# Patient Record
Sex: FEMALE | Race: WHITE | NOT HISPANIC OR LATINO | Employment: OTHER | URBAN - METROPOLITAN AREA
[De-identification: names, ages, dates, MRNs, and addresses within clinical notes are randomized per-mention and may not be internally consistent; named-entity substitution may affect disease eponyms.]

---

## 2019-10-29 ENCOUNTER — TELEPHONE (OUTPATIENT)
Dept: NEUROSURGERY | Facility: CLINIC | Age: 79
End: 2019-10-29

## 2019-10-29 NOTE — TELEPHONE ENCOUNTER
Provider:  GUTIERREZ Lu  Caller: Desiree  Time of call:   10:00  Phone #:  [phone]  Surgery:    Surgery Date:    Last visit:     Next visit:      recently .    ERASMO:         Reason for call:     Patient needs a letter mailed to her stating she can take her support dog on a plane with her.    Letter printed and left on Maribeth's desk for sig.

## 2021-08-09 PROBLEM — Z91.89 AT RISK FOR SLEEP APNEA: Status: ACTIVE | Noted: 2021-08-09

## 2021-08-09 PROBLEM — I49.3 PVC'S (PREMATURE VENTRICULAR CONTRACTIONS): Status: ACTIVE | Noted: 2021-08-09

## 2021-08-09 PROBLEM — R06.09 DOE (DYSPNEA ON EXERTION): Status: ACTIVE | Noted: 2021-08-09

## 2021-08-09 PROBLEM — I10 ESSENTIAL HYPERTENSION: Status: ACTIVE | Noted: 2021-08-09

## 2021-08-09 NOTE — PROGRESS NOTES
Subjective:     Encounter Date:08/10/2021    Patient ID: Desiree Forte is a 81 y.o.  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    SELF REFERRED  PHYSICIAN: None   ONCOLOGIST: [facility]      Chief Complaint:   Chief Complaint   Patient presents with   • Irregular Heart Beat     Consult   • Shortness of Breath     Problem List:  1. Dyspnea on exertion/acute pulmonary edema with respiratory failure  a. [facility] 3-day hospitalization 7/23/2021-7/26/2021 for hypoxia, pulmonary edema, dyspnea, NT-BNP 5260, , chest x-ray consistent with pulmonary edema with improvement of symptoms with IV Lasix  b. Echocardiogram 7/26/2021: LV cavity mildly dilated, wall thickness mildly increased, LVEF 40-45%, LV relaxation grade 1 diastolic dysfunction, LA moderately dilated, mitral valve annulus mildly calcified, patient started on Coreg, losartan, Lasix  c. Abdominal ultrasound 7/26/2021: No abdominal ascites, right pleural effusion  d. Bilateral lower extremity venous duplex 7/26/2021: No evidence of DVT  e. CCS class I chest discomfort/NYHA class II dyspnea on exertion symptoms with abnormal electrocardiogram  2. Palpitations/frequent PVCs/trigeminy/atrial tachycardia  3. Hypertension  4. Hypertriglyceridemia  5. IBS with explosive diarrhea  6. Prediabetic with hemoglobin A1c 5.7% July 2021   7. At risk for sleep apnea with snoring  8. History of left breast cancer treated with chemo and radiation  9. Surgical history:  a. Appendectomy  b. Hysterectomy  c. Left lumpectomy    Allergies   Allergen Reactions   • Metformin Diarrhea         Current Outpatient Medications:   •  carvedilol (COREG) 3.125 MG tablet, 3.125 mg 2 (Two) Times a Day., Disp: , Rfl:   •  fenofibrate (TRICOR) 145 MG tablet, Take 145 mg by mouth Daily., Disp: , Rfl:   •  furosemide (LASIX) 40 MG tablet, 40 mg Daily., Disp: ,  Rfl:   •  losartan (COZAAR) 25 MG tablet, 25 mg 2 (Two) Times a Day., Disp: , Rfl:     History of Present Illness  This is an 81-year-old white female who presents to establish care for dyspnea on exertion/CHF.  She recently had a 3-day hospitalization at [facility] in [place] for hypoxia, dyspnea, pulmonary edema, and on echocardiogram was found to have EF 40-45%.  Her lower extremity venous duplex was negative for DVT.  She had elevated BNP and was diuresed with IV Lasix.  She was recommended to discontinue Celebrex in view of NATIVIDAD.  She was started on losartan, Coreg, and Lasix during hospitalization.  She is at risk for sleep apnea with snoring but has not been diagnosed with sleep apnea in the past and has never had a sleep study.  Her A1c was 6.5%, but on glipizide is now 5.7%.  Patient has had intolerances to statins and she was told that her elevated triglycerides were familial.  She has no history of early CAD.  She has never been a smoker.  Up until recently the patient has been very active and rarely sits down for long.  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  She was visiting in [place] for a wedding when she developed pulmonary edema.  She has never had any CHF exacerbations in the past.  She denies any chest pain, presyncope, or syncope.  She had shortness of breath and abdominal distention.  She was also recently diagnosed with IBS but does not have a gastroenterologist.  Patient states that her bowel habits have changed recently and she has frequent episodes of explosive diarrhea.  She had a colonoscopy about 2 years ago that apparently was normal.  The patient no longer has a primary care physician and is requesting referral to someone in [place].  The patient has still had some shortness of breath on exertion since her hospitalization.  She also has episodes of dizziness.   She has been told of an irregular heartbeat in the past but denies any prior heart monitors, heart catheterizations, stress tests, diabetes mellitus, PIH, preeclampsia, gestational diabetes, thyroid dysfunction, COPD, rheumatic fever, asthma, or claudication.  She was recently diagnosed with prediabetes and was briefly on Metformin.  The patient states that she has lost around 18 pounds due to her frequent episodes of diarrhea and is now on a gluten-free diet.  Before her recent hospitalization, she was taking amlodipine because she would sometimes have lymphedema in her left arm because remotely she had breast cancer and chemotherapy and radiation treatments.  She has had both of her COVID vaccinations.  The patient's  used to be seen by Dr. Saucedo.  The patient is accompanied to the office today by her daughter.    Cardiovascular Disease Risk Factors  Hypertension, hyperlipidemia, diabetes mellitus, increased age    Social History     Socioeconomic History   • Marital status:      Spouse name: Not on file   • Number of children: 4   • Years of education: Not on file   • Highest education level: Not on file   Tobacco Use   • Smoking status: Never Smoker   • Smokeless tobacco: Never Used   Substance and Sexual Activity   • Alcohol use: Not Currently   • Drug use: Defer   • Sexual activity: Defer       Family History   Problem Relation Age of Onset   • Leukemia Mother    • Stroke Father        Review of Systems   Constitutional: Positive for malaise/fatigue and weight loss.   HENT: Negative.    Eyes: Positive for blurred vision.   Cardiovascular: Positive for dyspnea on exertion, irregular heartbeat, orthopnea and palpitations. Negative for chest pain, claudication, leg swelling, near-syncope and syncope.   Respiratory: Positive for cough, shortness of breath, sleep disturbances due to breathing and snoring.    Endocrine: Negative.    Hematologic/Lymphatic: Negative.    Skin: Negative.   "  Musculoskeletal: Negative.    Gastrointestinal: Positive for change in bowel habit and diarrhea. Negative for heartburn and melena.        IBS   Genitourinary: Negative.    Neurological: Positive for disturbances in coordination, excessive daytime sleepiness and dizziness. Negative for focal weakness, seizures and weakness.   Psychiatric/Behavioral: Negative.    Allergic/Immunologic: Negative.       Obtained and negative except as outlined in problem list and HPI.      ECG 12 Lead    Date/Time: 8/10/2021 3:07 PM  Performed by: Carlyle Saucedo MD  Authorized by: Carlyle Saucedo MD   Rhythm comments: Sinus rhythm with frequent PVCs, rightward axis, minimal voltage criteria for LVH, may be normal variant, borderline ECG, 70 bpm,  ms, QRS 94 ms,  ms, no prior ECGs to review                 Objective:       Vitals:    08/10/21 1356 08/10/21 1405   BP: 121/51 115/45   BP Location: Right arm Right arm   Patient Position: Sitting Standing   Pulse: 68 64   SpO2: 97% 96%   Weight: 51.4 kg (113 lb 6.4 oz)    Height: 162.6 cm (64\")      Body mass index is 19.47 kg/m².    Constitutional:       Appearance: Healthy appearance. Not in distress.   Eyes:      Funduscopic exam:     Right eye: AV nicking present. No hemorrhage or exudate.         Left eye: AV nicking present. No hemorrhage or exudate.   HENT:    Mouth/Throat:      Mouth: Mucous membranes are moist. Lacerations present. No injury, oral lesions or angioedema.      Dentition: Normal dentition. Does not have dentures. No dental tenderness, gingival swelling, dental caries, dental abscesses or gum lesions.      Tongue: No lesions. Tongue does not deviate from midline.      Palate: No mass and lesions.      Pharynx: Oropharynx is clear. Uvula midline. Uvula swelling present. No pharyngeal swelling, oropharyngeal exudate or posterior oropharyngeal erythema.      Tonsils: No tonsillar exudate or tonsillar abscesses.   Neck:      Vascular: Carotid bruit " present. No JVR. JVD normal.   Pulmonary:      Effort: Pulmonary effort is normal.      Breath sounds: Examination of the right-lower field reveals rales. Examination of the left-lower field reveals rales. No wheezing. No rhonchi. Rales (faint) present.   Chest:      Chest wall: Not tender to palpatation.   Cardiovascular:      PMI at left midclavicular line. Normal rate. Regular rhythm. Normal S1. Normal S2.      Murmurs: There is a grade 2/6 mid frequency harsh early systolic murmur at the URSB and LLSB, radiating to the neck.      No gallop. No click. No rub.   Pulses:     Carotid: 2+ bilaterally.     Radial: 2+ bilaterally.     Femoral: 2+ bilaterally.     Dorsalis pedis: 2+ bilaterally.     Posterior tibial: 2+ bilaterally.  Edema:     Peripheral edema absent.   Abdominal:      General: Bowel sounds are normal.      Palpations: Abdomen is soft.      Tenderness: There is no abdominal tenderness.   Musculoskeletal: Normal range of motion.         General: No tenderness. Skin:     General: Skin is warm and dry.   Neurological:      General: No focal deficit present.      Mental Status: Alert and oriented to person, place and time.         Lab Review:     · Chest x-ray 7/26/2021: Bilateral opacities most likely related to edema    7/26/2021:  · BMP: Sodium 138, potassium 4.1, chloride 103, carbon dioxide 28, glucose 101, BUN 55, creatinine 1.2, calcium 9.9, GFR 43      7/23/2021:  · CBC: WBC 10.5, RBC 4.11, hemoglobin 11, hematocrit 36.5, MCV 88.8, MCH 28.8, MCHC 30.1, RDW 14.9, platelets 517, MPV 11.1, neutrophils 76.8, lymphocytes 9.6, monocytes 8.4, eos 3.7, basos 0.6  · Hepatic function panel: Protein 6.8, albumin 4, bilirubin 0.4, ALT 38, AST 60, alkaline phosphatase 79, bilirubin 0.2  · INR 1, protime 10.9  · NT-BNP 5260  · Troponin 0.013    Advance Care Planning   ACP discussion was held with the patient during this visit. Patient has an advance directive (not in EMR), copy requested.  Assessment:        Patient with recent 3-day hospitalization at [facility] in [place] July 2021 for hypoxia, dyspnea, pulmonary edema, and on echocardiogram was found to have EF 40-45% and she was started on Coreg, losartan, and Lasix.  The patient will need to have an ischemic evaluation to assess for focal obstructive CAD.  The patient is agreeable to a heart catheterization; procedure, risks, and complications discussed with the patient in office and she is agreeable to proceed.  We will uptitrate her Coreg to 6.25 mg twice daily.We will also have her wear an E patch to assess PVC burden as well as other arrhythmias or PAF in view of her frequent PVCs and dizziness.  She is at risk for sleep apnea so we will screen her with an outpatient sleep oximetry study.  In view of the patient's recent hospitalization for pulmonary edema, will order renal artery duplex to rule out renal artery stenosis as a cause.  The patient does not have a primary care physician so we will make a referral to Kristofer Elder MD in [place].  Patient has had explosive diarrhea and recent diagnosis of IBS so we will make a referral to Jesus Morton MD.  Lastly, we will switch her Lasix to torsemide 10 mg daily.     Diagnosis Plan   1. BRUNO (dyspnea on exertion)  Holter Monitor - 72 Hour Up To 15 Days    Basic Metabolic Panel    Magnesium   2. PVC's (premature ventricular contractions)  Holter Monitor - 72 Hour Up To 15 Days    Basic Metabolic Panel    Magnesium   3. Essential hypertension  Holter Monitor - 72 Hour Up To 15 Days    Basic Metabolic Panel    Magnesium   4. At risk for sleep apnea  Outpatient sleep oximetry study          Plan:       1. Patient to continue current medications and close follow up with the above providers with the following recommendations:  A. LHC +/-CBI  B. BMP, Magnesium   C. OP sleep oximetry study  D. Uptitrating Coreg to 6.25 mg twice daily  E. E patch  F. Referral for PCP (Kristofer Elder MD), GI (Jesus Morton  MD)  G. Switch lasix to torsemide 10mg daily  H. Renal artery duplex  2. Tentative cardiology follow up in 6 weeks in the [place] office or patient may return sooner PRN  3. 1 800 card    Scribed for Carlyle Saucedo MD by Elvira Casper, APRN. 8/10/2021  15:03 EDT     I, Carlyle Saucedo MD, [facility], personally performed the services described in this documentation as scribed by the above named individual in my presence, and it is both accurate and complete. At 16:33 EDT on 08/10/2021

## 2021-08-10 ENCOUNTER — OFFICE VISIT (OUTPATIENT)
Dept: CARDIOLOGY | Facility: CLINIC | Age: 81
End: 2021-08-10

## 2021-08-10 ENCOUNTER — LAB (OUTPATIENT)
Dept: LAB | Facility: HOSPITAL | Age: 81
End: 2021-08-10

## 2021-08-10 VITALS
OXYGEN SATURATION: 96 % | BODY MASS INDEX: 19.36 KG/M2 | DIASTOLIC BLOOD PRESSURE: 45 MMHG | SYSTOLIC BLOOD PRESSURE: 115 MMHG | HEIGHT: 64 IN | HEART RATE: 64 BPM | WEIGHT: 113.4 LBS

## 2021-08-10 DIAGNOSIS — I10 ESSENTIAL HYPERTENSION: ICD-10-CM

## 2021-08-10 DIAGNOSIS — Z91.89 AT RISK FOR SLEEP APNEA: ICD-10-CM

## 2021-08-10 DIAGNOSIS — I49.3 PVC'S (PREMATURE VENTRICULAR CONTRACTIONS): ICD-10-CM

## 2021-08-10 DIAGNOSIS — R06.09 DOE (DYSPNEA ON EXERTION): ICD-10-CM

## 2021-08-10 DIAGNOSIS — R06.09 DOE (DYSPNEA ON EXERTION): Primary | ICD-10-CM

## 2021-08-10 PROCEDURE — 99204 OFFICE O/P NEW MOD 45 MIN: CPT | Performed by: INTERNAL MEDICINE

## 2021-08-10 PROCEDURE — 83735 ASSAY OF MAGNESIUM: CPT

## 2021-08-10 PROCEDURE — 36415 COLL VENOUS BLD VENIPUNCTURE: CPT

## 2021-08-10 PROCEDURE — 80048 BASIC METABOLIC PNL TOTAL CA: CPT

## 2021-08-10 PROCEDURE — 93000 ELECTROCARDIOGRAM COMPLETE: CPT | Performed by: INTERNAL MEDICINE

## 2021-08-10 RX ORDER — FUROSEMIDE 40 MG/1
40 TABLET ORAL DAILY
COMMUNITY
Start: 2021-07-26 | End: 2021-08-12 | Stop reason: ALTCHOICE

## 2021-08-10 RX ORDER — FENOFIBRATE 145 MG/1
145 TABLET, COATED ORAL DAILY
COMMUNITY
Start: 2021-05-21 | End: 2021-08-12 | Stop reason: SDUPTHER

## 2021-08-10 RX ORDER — TORSEMIDE 10 MG/1
10 TABLET ORAL DAILY
Qty: 90 TABLET | Refills: 3 | Status: SHIPPED | OUTPATIENT
Start: 2021-08-10 | End: 2021-11-01 | Stop reason: SDUPTHER

## 2021-08-10 RX ORDER — LOSARTAN POTASSIUM 25 MG/1
25 TABLET ORAL 2 TIMES DAILY
COMMUNITY
Start: 2021-07-26 | End: 2021-08-12 | Stop reason: SDUPTHER

## 2021-08-10 RX ORDER — CARVEDILOL 3.12 MG/1
3.12 TABLET ORAL 2 TIMES DAILY
COMMUNITY
Start: 2021-07-26 | End: 2021-08-10 | Stop reason: DRUGHIGH

## 2021-08-10 RX ORDER — CARVEDILOL 6.25 MG/1
6.25 TABLET ORAL 2 TIMES DAILY
Qty: 180 TABLET | Refills: 3 | Status: SHIPPED | OUTPATIENT
Start: 2021-08-10 | End: 2022-08-03

## 2021-08-11 ENCOUNTER — DOCUMENTATION (OUTPATIENT)
Dept: CARDIOLOGY | Facility: CLINIC | Age: 81
End: 2021-08-11

## 2021-08-11 LAB
ANION GAP SERPL CALCULATED.3IONS-SCNC: 9.8 MMOL/L (ref 5–15)
BUN SERPL-MCNC: 33 MG/DL (ref 8–23)
BUN/CREAT SERPL: 29.7 (ref 7–25)
CALCIUM SPEC-SCNC: 9.7 MG/DL (ref 8.6–10.5)
CHLORIDE SERPL-SCNC: 102 MMOL/L (ref 98–107)
CO2 SERPL-SCNC: 28.2 MMOL/L (ref 22–29)
CREAT SERPL-MCNC: 1.11 MG/DL (ref 0.57–1)
GFR SERPL CREATININE-BSD FRML MDRD: 47 ML/MIN/1.73
GLUCOSE SERPL-MCNC: 72 MG/DL (ref 65–99)
MAGNESIUM SERPL-MCNC: 2.2 MG/DL (ref 1.6–2.4)
POTASSIUM SERPL-SCNC: 4.4 MMOL/L (ref 3.5–5.2)
SODIUM SERPL-SCNC: 140 MMOL/L (ref 136–145)

## 2021-08-11 NOTE — PROGRESS NOTES
I called and spoke with the patient regarding her laboratory results. I encouraged the patient to drink more water since her creatinine was slightly elevated at 1.11 and she verbalized understanding.    08/10/2021:  · BMP: Glucose 72, BUN 33, creatinine 1.11, sodium 140, potassium 4.4, chloride 102, carbon dioxide 28.2, calcium 9.7, GFR 47  · Magnesium 2.20      Electronically signed by EMILY Bird, 08/11/21, 10:13 AM EDT.

## 2021-08-12 RX ORDER — LOSARTAN POTASSIUM 25 MG/1
25 TABLET ORAL 2 TIMES DAILY
Qty: 180 TABLET | Refills: 3 | Status: SHIPPED | OUTPATIENT
Start: 2021-08-12 | End: 2022-05-12

## 2021-08-12 RX ORDER — FENOFIBRATE 145 MG/1
145 TABLET, COATED ORAL DAILY
Qty: 90 TABLET | Refills: 3 | Status: SHIPPED | OUTPATIENT
Start: 2021-08-12 | End: 2022-07-17 | Stop reason: HOSPADM

## 2021-08-16 ENCOUNTER — TELEPHONE (OUTPATIENT)
Dept: CARDIOLOGY | Facility: CLINIC | Age: 81
End: 2021-08-16

## 2021-08-16 NOTE — TELEPHONE ENCOUNTER
"Pt only took torsemide 10 mg this morning. Pt states that since Thursday 8/12/21 she has to go to bed shortly after taking medications due to dizziness, light-headedness, weakness, heaviness in chest and SOB. Patient has blurry vision and trouble focusing after medications as well.   Patient started having these symptoms since Thursday 6/12/21 when patient started increased Coreg and torsemide 10 mg daily. Patient believes torsemide is the issue. Patient doesn't check BP/HR regularly. Patient states that symptoms last all day but decrease gradually. Pt's symptoms return and \"knock her out\" after next dose of medications.     Current /50 40    Pt denies swelling, chest pain, current SOB. Pt is still slightly dizzy and weak.     Pt currently taking:  Torsemide 10 mg daily  Coreg 6.25 mg BID  Losartan 25 mg BID  Fenofibrate 145 mg daily    Please advise.     "

## 2021-08-16 NOTE — TELEPHONE ENCOUNTER
Called pt and gave KTS recommendations above. Pt verbalizes understanding and agreeable to plan.    Pt is going to come in tomorrow, 8/17 for EKG.

## 2021-08-16 NOTE — TELEPHONE ENCOUNTER
Noted; suspect carvedilol is producing her symptoms and not torsemide.  Discontinue Coreg and have her obtain electrocardiogram.    Thanks!

## 2021-08-17 ENCOUNTER — CLINICAL SUPPORT (OUTPATIENT)
Dept: CARDIOLOGY | Facility: CLINIC | Age: 81
End: 2021-08-17

## 2021-08-17 ENCOUNTER — TELEPHONE (OUTPATIENT)
Dept: GASTROENTEROLOGY | Facility: CLINIC | Age: 81
End: 2021-08-17

## 2021-08-17 ENCOUNTER — OFFICE VISIT (OUTPATIENT)
Dept: GASTROENTEROLOGY | Facility: CLINIC | Age: 81
End: 2021-08-17

## 2021-08-17 VITALS
DIASTOLIC BLOOD PRESSURE: 64 MMHG | SYSTOLIC BLOOD PRESSURE: 114 MMHG | BODY MASS INDEX: 19.19 KG/M2 | HEART RATE: 74 BPM | TEMPERATURE: 96.6 F | HEIGHT: 64 IN | OXYGEN SATURATION: 96 % | WEIGHT: 112.4 LBS

## 2021-08-17 DIAGNOSIS — R19.7 DIARRHEA, UNSPECIFIED TYPE: ICD-10-CM

## 2021-08-17 DIAGNOSIS — R14.0 BLOATING: Primary | ICD-10-CM

## 2021-08-17 DIAGNOSIS — R63.4 WEIGHT LOSS: ICD-10-CM

## 2021-08-17 DIAGNOSIS — R19.4 CHANGE IN BOWEL HABITS: ICD-10-CM

## 2021-08-17 DIAGNOSIS — I49.3 PVC'S (PREMATURE VENTRICULAR CONTRACTIONS): Primary | ICD-10-CM

## 2021-08-17 PROCEDURE — 99204 OFFICE O/P NEW MOD 45 MIN: CPT | Performed by: NURSE PRACTITIONER

## 2021-08-17 PROCEDURE — 93000 ELECTROCARDIOGRAM COMPLETE: CPT | Performed by: INTERNAL MEDICINE

## 2021-08-17 RX ORDER — METRONIDAZOLE 500 MG/1
500 TABLET ORAL 2 TIMES DAILY
Qty: 14 TABLET | Refills: 0 | Status: SHIPPED | OUTPATIENT
Start: 2021-08-17 | End: 2021-08-18 | Stop reason: SDUPTHER

## 2021-08-17 NOTE — PROGRESS NOTES
New Patient Consultation     Patient Name: Desiree Forte  : 1940   MRN: [id number]     Chief Complaint:    Chief Complaint   Patient presents with   • Diarrhea   • Bloated   • Gas       History of Present Illness: Desiree Forte is a 81 y.o. female who is here today for a Gastroenterology Consultation for change in bowel habits.    Desiree reports a 1 year history of change in bowel habits. Symptoms are explosive gas, mucous, and bloating. She is having approx 5-6 bristol scale 4 Bms a day.  She reports anal leaking of mucous requiring her to wear a pad.  Prior to this she had normal bowel movements. There is no associated  abdominal pain.   Has had a 17 lb weight loss since Nanette- party from diet changes.  Following a gluten free diet did help her bloating symptoms but did not resolve them. Cutting out dairy did not change her symptoms.  Her symptoms did not correlate with medication changes. There are no new pets.  She did recently travel to [place]-no other travel.    Denies heart burn and trouble swallowing.  No fever or joint swelling. No personal history of autoimmune disease.  Remote history breast cancer- s/p chemo and radiation    Abdominal surgical Hx of appy and hysterectomy.    Prior to this year, Desiree has been very active.  She reports significant fatigue that is worsening over the past 2 to 3 weeks.  She notes some palpitations.  She was recently hospitalized in [place] for hypoxia, dyspnea, and pulmonary edema.  She is now followed closely with cardiology and is wearing a heart monitor.  She is scheduled for a left heart cath on the  of this month.  Subjective      Review of Systems:   Review of Systems   Constitutional: Positive for fatigue and unexpected weight loss. Negative for appetite change, chills and fever.   HENT: Negative for trouble swallowing.    Respiratory: Positive for shortness of breath.    Cardiovascular: Positive for palpitations. Negative for chest  pain.   Gastrointestinal: Positive for abdominal distention and diarrhea. Negative for abdominal pain, anal bleeding, blood in stool, constipation, nausea, rectal pain, vomiting, GERD and indigestion.   Neurological: Negative for confusion.       Past Medical History:   Past Medical History:   Diagnosis Date   • Abnormal heart rhythm    • Cancer (CMS/HCC)     breast   • Congestive heart failure (CHF) (CMS/HCC)    • Hyperlipidemia    • Irregular heart beat        Past Surgical History:   Past Surgical History:   Procedure Laterality Date   • APPENDECTOMY     • COLONOSCOPY     • HYSTERECTOMY         Family History:   Family History   Problem Relation Age of Onset   • Leukemia Mother    • Stroke Father    • Colon cancer Neg Hx    • Colon polyps Neg Hx    • Esophageal cancer Neg Hx        Social History:   Social History     Socioeconomic History   • Marital status:      Spouse name: Not on file   • Number of children: Not on file   • Years of education: Not on file   • Highest education level: Not on file   Tobacco Use   • Smoking status: Never Smoker   • Smokeless tobacco: Never Used   Vaping Use   • Vaping Use: Never used   Substance and Sexual Activity   • Alcohol use: Not Currently   • Drug use: Defer   • Sexual activity: Defer       Alcohol/Tobacco History:   Social History     Substance and Sexual Activity   Alcohol Use Not Currently     Social History     Tobacco Use   Smoking Status Never Smoker   Smokeless Tobacco Never Used       Medications:     Current Outpatient Medications:   •  carvedilol (COREG) 6.25 MG tablet, Take 1 tablet by mouth 2 (Two) Times a Day., Disp: 180 tablet, Rfl: 3  •  fenofibrate (TRICOR) 145 MG tablet, Take 1 tablet by mouth Daily., Disp: 90 tablet, Rfl: 3  •  losartan (COZAAR) 25 MG tablet, Take 1 tablet by mouth 2 (Two) Times a Day., Disp: 180 tablet, Rfl: 3  •  metroNIDAZOLE (FLAGYL) 500 MG tablet, Take 1 tablet by mouth 2 (Two) Times a Day., Disp: 14 tablet, Rfl: 0  •   "torsemide (DEMADEX) 10 MG tablet, Take 1 tablet by mouth Daily., Disp: 90 tablet, Rfl: 3    Allergies:   Allergies   Allergen Reactions   • Metformin Diarrhea   • Statins Other (See Comments)     Muscle pain       Objective     Physical Exam:  Vital Signs:   Vitals:    08/17/21 1005   BP: 114/64   BP Location: Right arm   Patient Position: Sitting   Cuff Size: Adult   Pulse: 74   Temp: 96.6 °F (35.9 °C)   TempSrc: Temporal   SpO2: 96%   Weight: 51 kg (112 lb 6.4 oz)   Height: 162.6 cm (64.02\")     Body mass index is 19.28 kg/m².     Physical Exam  Vitals and nursing note reviewed.   Constitutional:       General: She is not in acute distress.     Appearance: She is well-developed. She is not diaphoretic.   Eyes:      General: No scleral icterus.     Extraocular Movements:      Right eye: No nystagmus.      Left eye: No nystagmus.      Conjunctiva/sclera: Conjunctivae normal.      Pupils: Pupils are equal, round, and reactive to light.   Neck:      Thyroid: No thyromegaly.   Cardiovascular:      Rate and Rhythm: Normal rate and regular rhythm. Frequent extrasystoles are present.     Pulses: No decreased pulses.      Comments: Heart monitor in place  Pulmonary:      Effort: Pulmonary effort is normal. No respiratory distress.      Breath sounds: Examination of the left-upper field reveals decreased breath sounds. Examination of the left-lower field reveals decreased breath sounds. Decreased breath sounds present. No wheezing, rhonchi or rales.   Abdominal:      General: Bowel sounds are normal. There is no distension. There are no signs of injury.      Palpations: Abdomen is soft. There is no hepatomegaly or splenomegaly.      Tenderness: There is no abdominal tenderness.      Hernia: No hernia is present.   Musculoskeletal:      Cervical back: Neck supple.      Right lower leg: No edema.      Left lower leg: No edema.   Skin:     General: Skin is warm and dry.      Capillary Refill: Capillary refill takes 2 to 3 " seconds.      Coloration: Skin is not jaundiced or pale.      Findings: No bruising or petechiae.      Nails: There is no clubbing.   Neurological:      Mental Status: She is alert and oriented to person, place, and time.   Psychiatric:         Behavior: Behavior normal.         Thought Content: Thought content normal.         Judgment: Judgment normal.     Reviewed referring provider office note  Reviewed lab work and ultrasound of abdomen  Assessment / Plan      Assessment/Plan:   Diagnoses and all orders for this visit:    1. Bloating (Primary)  -     Ova & Parasite Examination - Stool, Per Rectum; Future  -     Gastrointestinal Panel, PCR - Stool, Per Rectum; Future  -     CT Abdomen Pelvis Without Contrast; Future    2. Change in bowel habits  -     Ova & Parasite Examination - Stool, Per Rectum; Future  -     Gastrointestinal Panel, PCR - Stool, Per Rectum; Future  -     CT Abdomen Pelvis Without Contrast; Future    3. Diarrhea, unspecified type  -     Ova & Parasite Examination - Stool, Per Rectum; Future  -     Gastrointestinal Panel, PCR - Stool, Per Rectum; Future  -     metroNIDAZOLE (FLAGYL) 500 MG tablet; Take 1 tablet by mouth 2 (Two) Times a Day.  Dispense: 14 tablet; Refill: 0  -     CT Abdomen Pelvis Without Contrast; Future    4. Weight loss  -     CT Abdomen Pelvis Without Contrast; Future       Unfortunately, patient has new diagnosis congestive heart failure and is undergoing cardiac work-up.  She will ultimately need a colonoscopy given her concerning symptoms, but we need to make sure she is stable enough from a cardiopulmonary standpoint.  She is headed up to the cardiology office after this visit to have an EKG.  We will obtain stool studies and treat for SIBO today with Flagyl (xifaxan was >$800).  Advised to take this medication with food.  Will obtain CT abdomen with oral contrast only due to NATIVIDAD  Follow Up:   Return in about 3 weeks (around 9/7/2021).    Plan of care reviewed with the  patient at the conclusion of today's visit.  Education was provided regarding diagnosis, management, and any prescribed or recommended OTC medications.  Patient verbalized understanding of and agreement with management plan.       EMILY Grover  [facility] [place] [facility]

## 2021-08-17 NOTE — TELEPHONE ENCOUNTER
Noted; her electrocardiogram remains abnormal but stable with occasional interpolated PVCs and borderline left ventricular hypertrophy without acute ST-T changes. She can restart her carvedilol 6.25 mg twice daily and I would have her hold her torsemide daily unless she develops increasing shortness of breath, edema, or weight gain of 2 to 3 pounds over 24 hours. She will need to weigh herself daily. Please have her update us in the next 3 days.    Thanks!

## 2021-08-17 NOTE — TELEPHONE ENCOUNTER
Patient came in for walk-in EKG, given to KTS for review. /56.    Patient reports that she has lost 17 pounds in the last 3 weeks, and she did not take any meds this morning but is still having same symptoms, dizziness and weakness.    Please advise.

## 2021-08-17 NOTE — TELEPHONE ENCOUNTER
I  Called Ms Ramírez back. Informed her that stool can be turned in at [facility]. Go through ER to registration to registered. Stool test orders has been faxed to their lab. Patient voiced understanding.

## 2021-08-18 ENCOUNTER — PREP FOR SURGERY (OUTPATIENT)
Dept: OTHER | Facility: HOSPITAL | Age: 81
End: 2021-08-18

## 2021-08-18 ENCOUNTER — TELEPHONE (OUTPATIENT)
Dept: INTERNAL MEDICINE | Facility: CLINIC | Age: 81
End: 2021-08-18

## 2021-08-18 DIAGNOSIS — R06.09 DYSPNEA ON EXERTION: Primary | ICD-10-CM

## 2021-08-18 DIAGNOSIS — R19.7 DIARRHEA, UNSPECIFIED TYPE: ICD-10-CM

## 2021-08-18 DIAGNOSIS — Z00.00 HEALTHCARE MAINTENANCE: ICD-10-CM

## 2021-08-18 PROBLEM — A04.72 C. DIFFICILE DIARRHEA: Status: ACTIVE | Noted: 2021-08-18

## 2021-08-18 RX ORDER — SODIUM CHLORIDE 0.9 % (FLUSH) 0.9 %
10 SYRINGE (ML) INJECTION AS NEEDED
Status: CANCELLED | OUTPATIENT
Start: 2021-08-18

## 2021-08-18 RX ORDER — ASPIRIN 81 MG/1
324 TABLET, CHEWABLE ORAL ONCE
Status: CANCELLED | OUTPATIENT
Start: 2021-08-18 | End: 2021-08-18

## 2021-08-18 RX ORDER — NITROGLYCERIN 0.4 MG/1
0.4 TABLET SUBLINGUAL
Status: CANCELLED | OUTPATIENT
Start: 2021-08-18

## 2021-08-18 RX ORDER — SODIUM CHLORIDE 0.9 % (FLUSH) 0.9 %
3 SYRINGE (ML) INJECTION EVERY 12 HOURS SCHEDULED
Status: CANCELLED | OUTPATIENT
Start: 2021-08-18

## 2021-08-18 RX ORDER — METRONIDAZOLE 500 MG/1
500 TABLET ORAL 2 TIMES DAILY
Qty: 6 TABLET | Refills: 0 | Status: SHIPPED | OUTPATIENT
Start: 2021-08-18 | End: 2021-08-18

## 2021-08-18 RX ORDER — ASPIRIN 81 MG/1
81 TABLET ORAL DAILY
Status: CANCELLED | OUTPATIENT
Start: 2021-08-19

## 2021-08-18 RX ORDER — ACETAMINOPHEN 325 MG/1
650 TABLET ORAL EVERY 4 HOURS PRN
Status: CANCELLED | OUTPATIENT
Start: 2021-08-18

## 2021-08-18 RX ORDER — METRONIDAZOLE 500 MG/1
500 TABLET ORAL 3 TIMES DAILY
Qty: 30 TABLET | Refills: 0 | Status: SHIPPED | OUTPATIENT
Start: 2021-08-18 | End: 2021-08-26

## 2021-08-18 NOTE — TELEPHONE ENCOUNTER
A cn from [facility] called and stated pts gi panel was CDIF positive. Pt is not a pt of  our office, just incase they call back.

## 2021-08-19 ENCOUNTER — TELEPHONE (OUTPATIENT)
Dept: CARDIOLOGY | Facility: CLINIC | Age: 81
End: 2021-08-19

## 2021-08-19 NOTE — TELEPHONE ENCOUNTER
Please see pt correspondence above.       Pt currently taking:  Coreg 6.25 mg BID  Torsemide 10 mg daily  Losartan 25 mg BID  Fenofibrate 145 mg daily    Please advise.

## 2021-08-19 NOTE — TELEPHONE ENCOUNTER
----- Message from Desiree Forte sent at 8/19/2021 11:34 AM EDT -----  Regarding: RE: Prescription Question  Contact: [phone]  My BP has run:  16th 138/52 pulse 40 3pm  16th 105/49 pulse 79 6 pm  17th 114/76 pulse 79 8:45 am  17th 116/52 pulse 74 8 pm  18th 134/71 pulse 71 8:30 am  18th 126/46 pulse LOW 6:30 pm  19th 111/44 pulse 67 11 am      No pain, no palpitations, yes shortness of breath. Oxygen levels appears good.   No change in shortness of breath..been this way for awhile.     ----- Message -----        From:AILYN TAVERAS        Sent:8/19/2021 10:59 AM EDT          To:Desiree Forte     Subject:RE: Prescription Question     Good morning,     What has your blood pressure been? Do you have any swelling, shortness of breath, palpitations, chest pain, dizziness, weight gain?     I was unable to get Dr. Elder' office on the phone, but I did fax them a referral. Hopefully they have received that and will call you soon to schedule. Please call their office and confirm.     Thanks,   Carmen Us RN       ----- Message -----        From:Desiree Forte        Sent:8/19/2021 10:52 AM EDT          To:Carlyle Saucedo MD     Subject:Prescription Question     Carmen Weeks, I have become confused about the meds to take.  Since the gastroenterologist has found my stomach issues and I find the symptoms are very much like what I thought were med related, I have resumed all the meds Dr. Saucedo initially prescribed. No adverse reactions at all!  Don’t know if this is what you wanted me to do, just a common sense approach.  I texted you before about this, got no reply, but unsure the message was entered correctly.   Also, was Dr Saucedo successful in obtaining Dr Elder in [place] for my local GP?   I appreciate all the help you are giving me!

## 2021-08-25 ENCOUNTER — TELEPHONE (OUTPATIENT)
Dept: GASTROENTEROLOGY | Facility: CLINIC | Age: 81
End: 2021-08-25

## 2021-08-25 DIAGNOSIS — R63.4 WEIGHT LOSS: Primary | ICD-10-CM

## 2021-08-25 DIAGNOSIS — R93.5 ABNORMAL CT OF THE ABDOMEN: ICD-10-CM

## 2021-08-25 DIAGNOSIS — Z85.3 PERSONAL HISTORY OF MALIGNANT NEOPLASM OF BREAST: ICD-10-CM

## 2021-08-25 NOTE — TELEPHONE ENCOUNTER
Spoke with patient regarding findings on CT concerning for malignancy.  Will place lab orders and be in contact about setting up endoscopy after discussing with cardiology.  Pt verbalized understanding and denied further questions at this time.

## 2021-08-26 ENCOUNTER — APPOINTMENT (OUTPATIENT)
Dept: PREADMISSION TESTING | Facility: HOSPITAL | Age: 81
End: 2021-08-26

## 2021-08-26 ENCOUNTER — TELEPHONE (OUTPATIENT)
Dept: GASTROENTEROLOGY | Facility: CLINIC | Age: 81
End: 2021-08-26

## 2021-08-26 DIAGNOSIS — A04.72 COLITIS, CLOSTRIDIUM DIFFICILE: Primary | ICD-10-CM

## 2021-08-26 RX ORDER — VANCOMYCIN HYDROCHLORIDE 125 MG/1
125 CAPSULE ORAL 4 TIMES DAILY
Qty: 40 CAPSULE | Refills: 0 | Status: SHIPPED | OUTPATIENT
Start: 2021-08-26 | End: 2021-09-05

## 2021-08-26 NOTE — TELEPHONE ENCOUNTER
Spoke with patient.  We will set up for EGD and colonoscopy in 2 weeks.  This will be done in the hospital after PAT and with cardiac clearance.  She has had resolution of the bloating with the Flagyl.  She still has 5 more days of antibiotics.  Advised to complete treatment.

## 2021-08-27 ENCOUNTER — PREP FOR SURGERY (OUTPATIENT)
Dept: OTHER | Facility: HOSPITAL | Age: 81
End: 2021-08-27

## 2021-08-27 DIAGNOSIS — R93.89 IMAGING ABNORMALITY: Primary | ICD-10-CM

## 2021-08-30 DIAGNOSIS — Z12.11 ENCOUNTER FOR SCREENING COLONOSCOPY: Primary | ICD-10-CM

## 2021-08-30 DIAGNOSIS — R93.89 ABNORMAL FINDING ON CT SCAN: Primary | ICD-10-CM

## 2021-08-30 NOTE — TELEPHONE ENCOUNTER
PATIENT IS SCHEDULED FOR COLON AND EGD ON 09.07.21 WITH DR COSTA. PLEASE OBTAIN CARDIAC CLEARANCE. PATIENT SEE DR CASTAÑEDA HERE AT [facility].

## 2021-08-30 NOTE — TELEPHONE ENCOUNTER
NEED CARDIAC CLEARANCE FOR EGD & COLONOSCOPY ON 09/07/2021 BY DR COSTA.  THANKS direct patient care (not related to procedure)/additional history taking/consult w/ pt's family directly relating to pts condition

## 2021-09-03 ENCOUNTER — PRE-ADMISSION TESTING (OUTPATIENT)
Dept: PREADMISSION TESTING | Facility: HOSPITAL | Age: 81
End: 2021-09-03

## 2021-09-03 VITALS — WEIGHT: 110.6 LBS | HEIGHT: 64 IN | BODY MASS INDEX: 18.88 KG/M2

## 2021-09-03 DIAGNOSIS — R93.5 ABNORMAL CT OF THE ABDOMEN: ICD-10-CM

## 2021-09-03 DIAGNOSIS — Z85.3 PERSONAL HISTORY OF MALIGNANT NEOPLASM OF BREAST: ICD-10-CM

## 2021-09-03 DIAGNOSIS — R63.4 WEIGHT LOSS: ICD-10-CM

## 2021-09-03 LAB
ALBUMIN SERPL-MCNC: 4.1 G/DL (ref 3.5–5.2)
ALBUMIN/GLOB SERPL: 1.7 G/DL
ALP SERPL-CCNC: 53 U/L (ref 39–117)
ALT SERPL W P-5'-P-CCNC: 15 U/L (ref 1–33)
ANION GAP SERPL CALCULATED.3IONS-SCNC: 11 MMOL/L (ref 5–15)
AST SERPL-CCNC: 31 U/L (ref 1–32)
BASOPHILS # BLD AUTO: 0.06 10*3/MM3 (ref 0–0.2)
BASOPHILS NFR BLD AUTO: 0.9 % (ref 0–1.5)
BILIRUB SERPL-MCNC: 0.2 MG/DL (ref 0–1.2)
BUN SERPL-MCNC: 31 MG/DL (ref 8–23)
BUN/CREAT SERPL: 28.2 (ref 7–25)
CALCIUM SPEC-SCNC: 10.3 MG/DL (ref 8.6–10.5)
CEA SERPL-MCNC: 2.08 NG/ML
CHLORIDE SERPL-SCNC: 104 MMOL/L (ref 98–107)
CO2 SERPL-SCNC: 27 MMOL/L (ref 22–29)
CREAT SERPL-MCNC: 1.1 MG/DL (ref 0.57–1)
DEPRECATED RDW RBC AUTO: 52.3 FL (ref 37–54)
EOSINOPHIL # BLD AUTO: 0.17 10*3/MM3 (ref 0–0.4)
EOSINOPHIL NFR BLD AUTO: 2.5 % (ref 0.3–6.2)
ERYTHROCYTE [DISTWIDTH] IN BLOOD BY AUTOMATED COUNT: 16.9 % (ref 12.3–15.4)
GFR SERPL CREATININE-BSD FRML MDRD: 48 ML/MIN/1.73
GLOBULIN UR ELPH-MCNC: 2.4 GM/DL
GLUCOSE SERPL-MCNC: 88 MG/DL (ref 65–99)
HCT VFR BLD AUTO: 33.9 % (ref 34–46.6)
HGB BLD-MCNC: 10.5 G/DL (ref 12–15.9)
IMM GRANULOCYTES # BLD AUTO: 0.02 10*3/MM3 (ref 0–0.05)
IMM GRANULOCYTES NFR BLD AUTO: 0.3 % (ref 0–0.5)
LYMPHOCYTES # BLD AUTO: 1.05 10*3/MM3 (ref 0.7–3.1)
LYMPHOCYTES NFR BLD AUTO: 15.7 % (ref 19.6–45.3)
MCH RBC QN AUTO: 26.3 PG (ref 26.6–33)
MCHC RBC AUTO-ENTMCNC: 31 G/DL (ref 31.5–35.7)
MCV RBC AUTO: 84.8 FL (ref 79–97)
MONOCYTES # BLD AUTO: 0.7 10*3/MM3 (ref 0.1–0.9)
MONOCYTES NFR BLD AUTO: 10.5 % (ref 5–12)
NEUTROPHILS NFR BLD AUTO: 4.67 10*3/MM3 (ref 1.7–7)
NEUTROPHILS NFR BLD AUTO: 70.1 % (ref 42.7–76)
NRBC BLD AUTO-RTO: 0 /100 WBC (ref 0–0.2)
PLATELET # BLD AUTO: 445 10*3/MM3 (ref 140–450)
PMV BLD AUTO: 10.6 FL (ref 6–12)
POTASSIUM SERPL-SCNC: 4.5 MMOL/L (ref 3.5–5.2)
PROT SERPL-MCNC: 6.5 G/DL (ref 6–8.5)
RBC # BLD AUTO: 4 10*6/MM3 (ref 3.77–5.28)
SODIUM SERPL-SCNC: 142 MMOL/L (ref 136–145)
WBC # BLD AUTO: 6.67 10*3/MM3 (ref 3.4–10.8)

## 2021-09-03 PROCEDURE — 82378 CARCINOEMBRYONIC ANTIGEN: CPT

## 2021-09-03 PROCEDURE — 85025 COMPLETE CBC W/AUTO DIFF WBC: CPT

## 2021-09-03 PROCEDURE — 36415 COLL VENOUS BLD VENIPUNCTURE: CPT

## 2021-09-03 PROCEDURE — 80053 COMPREHEN METABOLIC PANEL: CPT

## 2021-09-03 NOTE — DISCHARGE INSTRUCTIONS
The following information and instructions were given:  Per Anesthesia Request, patient instructed not to take their ACE/ARB medications on the AM of surgery.    Do not eat, drink, smoke or chew gum after midnight the night before surgery. This includes no mints.  Take all routine, prescribed medications including heart and blood pressure medicines with a sip of water unless otherwise instructed by your physician.   Do NOT take diabetic medication unless instructed by your physician.    DO NOT shave for two days before your procedure.  Do not wear makeup.      DO NOT wear fingernail polish (gel/regular) and/or acrylic/artificial nails on the day of surgery.   If you had a recent manicure and would rather not remove polish or artificial nails, the minimum requirement is that the polish/artificial nails must be removed from the middle finger on each hand.      If you are having surgery/procedure on an upper extremity, fingernail polish/artificial fingernails must be removed for surgery.  NO EXCEPTIONS.      If you are having surgery/procedure on a lower extremity, toenail polish on both extremities must be removed for surgery.  NO EXCEPTIONS.    Remove all jewelry (advise to go to jeweler if unable to remove).  Jewelry, especially rings, can no longer be taped for surgery.    Leave anything you consider valuable at home.    Leave your suitcase in the car until after your surgery.    Bring the following with you the day of your procedure (when applicable):       -Picture ID and insurance cards       -Co-pay/deductible required by insurance       -Medications in the original bottles (not a list) including all over-the-counter medications if not brought to PAT       -Copy of advance directive, living will or power of  documents if not brought to PAT       -CPAP or BIPAP mask and tubing (do not bring machine)       -Skin prep instruction(s) sheet       -PAT Pass    Education booklet, brochure, handout or binder  related to procedure given to patient.  Education booklet also includes general information related to their recovery that mentions signs and symptoms of infection and when to call the doctor.    When applicable, an ERAS handout/booklet was given to patient.    Pain Control After Surgery handout given to patient.    Respirex use (handout given to patient) and pneumonia prevention education provided.    Signs and Symptoms of infection discussed with patient in Pre Admission Testing.  Patient instructed to call their doctor if any of the following symptoms are noted during recovery:  Fever of 100.4 F or higher, incision that is warm or has increasing bleeding, redness or drainage.    DVT Prevention instructions given verbally during Pre Admission Testing appointment that stress the importance of ambulation to improve blood circulation.  Also encouraged patient to perform foot exercises when in bed and application of a sequential device may be applied to lower extremities to improve circulation.      Please apply Chlorhexidine wipes to surgical area (if instructed) the night before procedure and the AM of procedure and document date/time of applications on skin prep instruction sheet.

## 2021-09-03 NOTE — PAT
Please note that the patient will be finished with her vancomycin for c-diff (diarrhea) prior to her scheduled colonoscopy.    No blood pressures or sticks to left arm due to history of breast cancer.  Patient given a pink bracelet and advised to bring on the day of procedure.  Note placed in FYI tab.    Cardiology clearance from Dr Saucedo on chart

## 2021-09-06 ENCOUNTER — ANESTHESIA EVENT (OUTPATIENT)
Dept: GASTROENTEROLOGY | Facility: HOSPITAL | Age: 81
End: 2021-09-06

## 2021-09-07 ENCOUNTER — HOSPITAL ENCOUNTER (OUTPATIENT)
Facility: HOSPITAL | Age: 81
Setting detail: HOSPITAL OUTPATIENT SURGERY
Discharge: HOME | End: 2021-09-07
Attending: INTERNAL MEDICINE | Admitting: INTERNAL MEDICINE

## 2021-09-07 ENCOUNTER — ANESTHESIA (OUTPATIENT)
Dept: GASTROENTEROLOGY | Facility: HOSPITAL | Age: 81
End: 2021-09-07

## 2021-09-07 VITALS
TEMPERATURE: 97 F | OXYGEN SATURATION: 98 % | SYSTOLIC BLOOD PRESSURE: 158 MMHG | HEART RATE: 60 BPM | DIASTOLIC BLOOD PRESSURE: 66 MMHG | RESPIRATION RATE: 16 BRPM

## 2021-09-07 DIAGNOSIS — R93.89 IMAGING ABNORMALITY: ICD-10-CM

## 2021-09-07 PROCEDURE — 25010000002 PROPOFOL 10 MG/ML EMULSION: Performed by: NURSE ANESTHETIST, CERTIFIED REGISTERED

## 2021-09-07 PROCEDURE — 43239 EGD BIOPSY SINGLE/MULTIPLE: CPT | Performed by: INTERNAL MEDICINE

## 2021-09-07 PROCEDURE — C1889 IMPLANT/INSERT DEVICE, NOC: HCPCS | Performed by: INTERNAL MEDICINE

## 2021-09-07 PROCEDURE — 45380 COLONOSCOPY AND BIOPSY: CPT | Performed by: INTERNAL MEDICINE

## 2021-09-07 PROCEDURE — 45390 COLONOSCOPY W/RESECTION: CPT | Performed by: INTERNAL MEDICINE

## 2021-09-07 PROCEDURE — 88305 TISSUE EXAM BY PATHOLOGIST: CPT | Performed by: INTERNAL MEDICINE

## 2021-09-07 PROCEDURE — 45385 COLONOSCOPY W/LESION REMOVAL: CPT | Performed by: INTERNAL MEDICINE

## 2021-09-07 DEVICE — DEV CLIP ENDO RESOLUTION360 CONTRL ROT 235CM: Type: IMPLANTABLE DEVICE | Site: CECUM | Status: FUNCTIONAL

## 2021-09-07 RX ORDER — SODIUM CHLORIDE 0.9 % (FLUSH) 0.9 %
10 SYRINGE (ML) INJECTION AS NEEDED
Status: DISCONTINUED | OUTPATIENT
Start: 2021-09-07 | End: 2021-09-07 | Stop reason: HOSPADM

## 2021-09-07 RX ORDER — ACETAMINOPHEN 325 MG/1
650 TABLET ORAL ONCE AS NEEDED
Status: DISCONTINUED | OUTPATIENT
Start: 2021-09-07 | End: 2021-09-07 | Stop reason: HOSPADM

## 2021-09-07 RX ORDER — LIDOCAINE HYDROCHLORIDE 10 MG/ML
0.5 INJECTION, SOLUTION EPIDURAL; INFILTRATION; INTRACAUDAL; PERINEURAL ONCE AS NEEDED
Status: DISCONTINUED | OUTPATIENT
Start: 2021-09-07 | End: 2021-09-07 | Stop reason: HOSPADM

## 2021-09-07 RX ORDER — PROPOFOL 10 MG/ML
VIAL (ML) INTRAVENOUS AS NEEDED
Status: DISCONTINUED | OUTPATIENT
Start: 2021-09-07 | End: 2021-09-07 | Stop reason: SURG

## 2021-09-07 RX ORDER — FAMOTIDINE 20 MG/1
20 TABLET, FILM COATED ORAL ONCE
Status: DISCONTINUED | OUTPATIENT
Start: 2021-09-07 | End: 2021-09-07 | Stop reason: HOSPADM

## 2021-09-07 RX ORDER — SODIUM CHLORIDE, SODIUM LACTATE, POTASSIUM CHLORIDE, CALCIUM CHLORIDE 600; 310; 30; 20 MG/100ML; MG/100ML; MG/100ML; MG/100ML
9 INJECTION, SOLUTION INTRAVENOUS CONTINUOUS
Status: DISCONTINUED | OUTPATIENT
Start: 2021-09-07 | End: 2021-09-07 | Stop reason: HOSPADM

## 2021-09-07 RX ORDER — SODIUM CHLORIDE 0.9 % (FLUSH) 0.9 %
10 SYRINGE (ML) INJECTION EVERY 12 HOURS SCHEDULED
Status: DISCONTINUED | OUTPATIENT
Start: 2021-09-07 | End: 2021-09-07 | Stop reason: HOSPADM

## 2021-09-07 RX ORDER — FAMOTIDINE 10 MG/ML
20 INJECTION, SOLUTION INTRAVENOUS ONCE
Status: COMPLETED | OUTPATIENT
Start: 2021-09-07 | End: 2021-09-07

## 2021-09-07 RX ORDER — IPRATROPIUM BROMIDE AND ALBUTEROL SULFATE 2.5; .5 MG/3ML; MG/3ML
3 SOLUTION RESPIRATORY (INHALATION) ONCE AS NEEDED
Status: DISCONTINUED | OUTPATIENT
Start: 2021-09-07 | End: 2021-09-07 | Stop reason: HOSPADM

## 2021-09-07 RX ORDER — LIDOCAINE HYDROCHLORIDE 10 MG/ML
INJECTION, SOLUTION EPIDURAL; INFILTRATION; INTRACAUDAL; PERINEURAL AS NEEDED
Status: DISCONTINUED | OUTPATIENT
Start: 2021-09-07 | End: 2021-09-07 | Stop reason: SURG

## 2021-09-07 RX ORDER — MIDAZOLAM HYDROCHLORIDE 1 MG/ML
0.5 INJECTION INTRAMUSCULAR; INTRAVENOUS
Status: DISCONTINUED | OUTPATIENT
Start: 2021-09-07 | End: 2021-09-07 | Stop reason: HOSPADM

## 2021-09-07 RX ADMIN — SODIUM CHLORIDE, POTASSIUM CHLORIDE, SODIUM LACTATE AND CALCIUM CHLORIDE 9 ML/HR: 600; 310; 30; 20 INJECTION, SOLUTION INTRAVENOUS at 08:13

## 2021-09-07 RX ADMIN — PROPOFOL 160 MCG/KG/MIN: 10 INJECTION, EMULSION INTRAVENOUS at 08:43

## 2021-09-07 RX ADMIN — PROPOFOL 100 MG: 10 INJECTION, EMULSION INTRAVENOUS at 08:43

## 2021-09-07 RX ADMIN — FAMOTIDINE 20 MG: 10 INJECTION INTRAVENOUS at 08:13

## 2021-09-07 RX ADMIN — LIDOCAINE HYDROCHLORIDE 100 MG: 10 INJECTION, SOLUTION EPIDURAL; INFILTRATION; INTRACAUDAL; PERINEURAL at 08:43

## 2021-09-07 NOTE — ANESTHESIA PREPROCEDURE EVALUATION
Anesthesia Evaluation                  Airway   Mallampati: II  Dental      Pulmonary    Cardiovascular     (+) hypertension, CHF ,       Neuro/Psych  GI/Hepatic/Renal/Endo      Musculoskeletal     Abdominal    Substance History      OB/GYN          Other                        Anesthesia Plan    ASA 3     MAC     intravenous induction     Anesthetic plan, all risks, benefits, and alternatives have been provided, discussed and informed consent has been obtained with: patient.

## 2021-09-07 NOTE — ANESTHESIA POSTPROCEDURE EVALUATION
Patient: Desiree Forte    Procedure Summary     Date: 09/07/21 Room / Location:  [facility]    Anesthesia Start: 0838 Anesthesia Stop: 0927    Procedures:       COLONOSCOPY (N/A )      ESOPHAGOGASTRODUODENOSCOPY (N/A ) Diagnosis:       Imaging abnormality      (Imaging abnormality [R93.89])    Surgeons: Jesus Morton MD Provider: Darius Pollard MD    Anesthesia Type: MAC ASA Status: 3          Anesthesia Type: MAC    Vitals  No vitals data found for the desired time range.          Post Anesthesia Care and Evaluation    Patient location during evaluation: PACU  Patient participation: complete - patient participated  Level of consciousness: awake and alert  Pain management: adequate  Airway patency: patent  Anesthetic complications: No anesthetic complications  PONV Status: none  Cardiovascular status: hemodynamically stable and acceptable  Respiratory status: nonlabored ventilation, acceptable and nasal cannula  Hydration status: acceptable

## 2021-09-07 NOTE — DISCHARGE INSTRUCTIONS
Colonoscopy, Adult, Care After  This sheet gives you information about how to care for yourself after your procedure. Your doctor may also give you more specific instructions. If you have problems or questions, call your doctor.  What can I expect after the procedure?  After the procedure, it is common to have:  · A small amount of blood in your poop (stool) for 24 hours.  · Some gas.  · Mild cramping or bloating in your belly (abdomen).  Follow these instructions at home:  Eating and drinking    · Drink enough fluid to keep your pee (urine) pale yellow.  · Follow instructions from your doctor about what you cannot eat or drink.  · Return to your normal diet as told by your doctor. Avoid heavy or fried foods that are hard to digest.  Activity  · Rest as told by your doctor.  · Do not sit for a long time without moving. Get up to take short walks every 1-2 hours. This is important. Ask for help if you feel weak or unsteady.  · Return to your normal activities as told by your doctor. Ask your doctor what activities are safe for you.  To help cramping and bloating:    · Try walking around.  · Put heat on your belly as told by your doctor. Use the heat source that your doctor recommends, such as a moist heat pack or a heating pad.  ? Put a towel between your skin and the heat source.  ? Leave the heat on for 20-30 minutes.  ? Remove the heat if your skin turns bright red. This is very important if you are unable to feel pain, heat, or cold. You may have a greater risk of getting burned.  General instructions  · If you were given a medicine to help you relax (sedative) during your procedure, it can affect you for many hours. Do not drive or use machinery until your doctor says that it is safe.  · For the first 24 hours after the procedure:  ? Do not sign important documents.  ? Do not drink alcohol.  ? Do your daily activities more slowly than normal.  ? Eat foods that are soft and easy to digest.  · Take  over-the-counter or prescription medicines only as told by your doctor.  · Keep all follow-up visits as told by your doctor. This is important.  Contact a doctor if:  · You have blood in your poop 2-3 days after the procedure.  Get help right away if:  · You have more than a small amount of blood in your poop.  · You see large clumps of tissue (blood clots) in your poop.  · Your belly is swollen.  · You feel like you may vomit (nauseous).  · You vomit.  · You have a fever.  · You have belly pain that gets worse, and medicine does not help your pain.  Summary  · After the procedure, it is common to have a small amount of blood in your poop. You may also have mild cramping and bloating in your belly.  · If you were given a medicine to help you relax (sedative) during your procedure, it can affect you for many hours. Do not drive or use machinery until your doctor says that it is safe.  · Get help right away if you have a lot of blood in your poop, feel like you may vomit, have a fever, or have more belly pain.  This information is not intended to replace advice given to you by your health care provider. Make sure you discuss any questions you have with your health care provider.  Document Revised: 10/23/2020 Document Reviewed: 07/13/2020  [employer] Patient Education © 2021 [employer]  Upper Endoscopy, Adult, Care After  This sheet gives you information about how to care for yourself after your procedure. Your health care provider may also give you more specific instructions. If you have problems or questions, contact your health care provider.  What can I expect after the procedure?  After the procedure, it is common to have:  · A sore throat.  · Mild stomach pain or discomfort.  · Bloating.  · Nausea.  Follow these instructions at home:    · Follow instructions from your health care provider about what to eat or drink after your procedure.  · Return to your normal activities as told by your health care provider.  Ask your health care provider what activities are safe for you.  · Take over-the-counter and prescription medicines only as told by your health care provider.  · If you were given a sedative during the procedure, it can affect you for several hours. Do not drive or operate machinery until your health care provider says that it is safe.  · Keep all follow-up visits as told by your health care provider. This is important.  Contact a health care provider if you have:  · A sore throat that lasts longer than one day.  · Trouble swallowing.  Get help right away if:  · You vomit blood or your vomit looks like coffee grounds.  · You have:  ? A fever.  ? Bloody, black, or tarry stools.  ? A severe sore throat or you cannot swallow.  ? Difficulty breathing.  ? Severe pain in your chest or abdomen.  Summary  · After the procedure, it is common to have a sore throat, mild stomach discomfort, bloating, and nausea.  · If you were given a sedative during the procedure, it can affect you for several hours. Do not drive or operate machinery until your health care provider says that it is safe.  · Follow instructions from your health care provider about what to eat or drink after your procedure.  · Return to your normal activities as told by your health care provider.  This information is not intended to replace advice given to you by your health care provider. Make sure you discuss any questions you have with your health care provider.  Document Revised: 12/15/2020 Document Reviewed: 05/20/2019  [employer] Patient Education © 2021 [employer]  Moderate Conscious Sedation, Adult  Sedation is the use of medicines to promote relaxation and to relieve discomfort and anxiety. Moderate conscious sedation is a type of sedation. Under moderate conscious sedation, you are less alert than normal, but you are still able to respond to instructions, touch, or both.  Moderate conscious sedation is used during short medical and dental procedures.  It is milder than deep sedation, which is a type of sedation under which you cannot be easily woken up. It is also milder than general anesthesia, which is the use of medicines to make you unconscious. Moderate conscious sedation allows you to return to your regular activities sooner.  Tell a health care provider about:  · Any allergies you have.  · All medicines you are taking, including vitamins, herbs, eye drops, creams, and over-the-counter medicines.  · Any use of steroids. This includes steroids taken by mouth or as a cream.  · Any problems you or family members have had with sedatives and anesthetic medicines.  · Any blood disorders you have.  · Any surgeries you have had.  · Any medical conditions you have, such as sleep apnea.  · Whether you are pregnant or may be pregnant.  · Any use of cigarettes, alcohol, marijuana, or drugs.  What are the risks?  Generally, this is a safe procedure. However, problems may occur, including:  · Getting too much medicine (oversedation).  · Nausea.  · Allergic reaction to medicines.  · Trouble breathing. If this happens, a breathing tube may be used. It will be removed when you are awake and breathing on your own.  · Heart trouble.  · Lung trouble.  · Confusion that gets better with time (emergence delirium).  What happens before the procedure?  Staying hydrated  Follow instructions from your health care provider about hydration, which may include:  · Up to 2 hours before the procedure - you may continue to drink clear liquids, such as water, clear fruit juice, black coffee, and plain tea.  Eating and drinking restrictions  Follow instructions from your health care provider about eating and drinking, which may include:  · 8 hours before the procedure - stop eating heavy meals or foods, such as meat, fried foods, or fatty foods.  · 6 hours before the procedure - stop eating light meals or foods, such as toast or cereal.  · 6 hours before the procedure - stop drinking milk or  drinks that contain milk.  · 2 hours before the procedure - stop drinking clear liquids.  Medicines  Ask your health care provider about:  · Changing or stopping your regular medicines. This is especially important if you are taking diabetes medicines or blood thinners.  · Taking medicines such as aspirin and ibuprofen. These medicines can thin your blood. Do not take these medicines unless your health care provider tells you to take them.  · Taking over-the-counter medicines, vitamins, herbs, and supplements.  Tests and exams  · You will have a physical exam.  · You may have blood tests done to show how well:  ? Your kidneys and liver work.  ? Your blood clots.  General instructions  · Plan to have someone take you home from the hospital or clinic.  · If you will be going home right after the procedure, plan to have someone with you for 24 hours.  What happens during the procedure?    · You will be given the sedative. The sedative may be given:  ? As a pill that you will swallow. It can also be inserted into the rectum.  ? As a spray through the nose.  ? As an injection into the muscle.  ? As an injection into the vein through an IV.  · You may be given oxygen as needed.  · Your breathing, heart rate, and blood pressure will be monitored during the procedure.  · The medical or dental procedure will be done.  The procedure may vary among health care providers and hospitals.  What happens after the procedure?  · Your blood pressure, heart rate, breathing rate, and blood oxygen level will be monitored until you leave the hospital or clinic.  · You will get fluids through your IV if needed.  · Do not drive or operate machinery until your health care provider says that it is safe.  Summary  · Sedation is the use of medicines to promote relaxation and to relieve discomfort and anxiety. Moderate conscious sedation is a type of sedation that is used during short medical and dental procedures.  · Tell the health care  provider about any medical conditions that you have and about all the medicines that you are taking.  · You will be given the sedative as a pill, a spray through the nose, an injection into the muscle, or an injection into the vein through an IV. Vital signs are monitored during the sedation.  · Moderate conscious sedation allows you to return to your regular activities sooner.  This information is not intended to replace advice given to you by your health care provider. Make sure you discuss any questions you have with your health care provider.  Document Revised: 11/12/2020 Document Reviewed: 11/12/2020  [employer] Patient Education © 2021 [employer]

## 2021-09-07 NOTE — INTERVAL H&P NOTE
H&P reviewed. The patient was examined and there are no changes to the H&P.    Anemia, h/o c.dif, bloat. Agree with justin's note. To egd/colonoscopy    The risk of endoscopy was discussed including the risk of anesthesia, bowel perforation, bleeding, missed lesion, infection, and death should a severe complication occur.  The patient determined that the diagnostic benefit outweighed the risk.

## 2021-09-08 ENCOUNTER — TELEPHONE (OUTPATIENT)
Dept: GASTROENTEROLOGY | Facility: CLINIC | Age: 81
End: 2021-09-08

## 2021-09-08 DIAGNOSIS — D48.4 NEOPLASM OF UNCERTAIN BEHAVIOR OF OMENTUM: Primary | ICD-10-CM

## 2021-09-08 DIAGNOSIS — R93.89 ABNORMAL FINDING ON CT SCAN: ICD-10-CM

## 2021-09-08 DIAGNOSIS — R63.4 WEIGHT LOSS: ICD-10-CM

## 2021-09-08 LAB
CYTO UR: NORMAL
LAB AP CASE REPORT: NORMAL
LAB AP CLINICAL INFORMATION: NORMAL
PATH REPORT.FINAL DX SPEC: NORMAL
PATH REPORT.GROSS SPEC: NORMAL

## 2021-09-08 NOTE — TELEPHONE ENCOUNTER
Patient states that her last ct scan was done with oral contrast - do you still want another CT scan? thanks

## 2021-09-08 NOTE — TELEPHONE ENCOUNTER
Can you please let the patient know that no cancer was found on her scopes.  While this is good news, it does not give us a source for her CT abnormality.  I am going to refer her to a general surgeon to see if we can biopsy the omentum.  I will refer her to Dr. Hardin or Dr. Jaimes

## 2021-09-08 NOTE — TELEPHONE ENCOUNTER
In addition to referring her to general surgery, I am going to repeat her CT but this time with contrast

## 2021-09-09 ENCOUNTER — TELEPHONE (OUTPATIENT)
Dept: GASTROENTEROLOGY | Facility: CLINIC | Age: 81
End: 2021-09-09

## 2021-09-09 DIAGNOSIS — I47.29 VENTRICULAR TACHYCARDIA (PAROXYSMAL) (HCC): Primary | ICD-10-CM

## 2021-09-09 NOTE — TELEPHONE ENCOUNTER
----- Message from Quentin Pimentel MA sent at 9/9/2021  3:17 PM EDT -----  Regarding: RE: Visit Follow-Up Question  Contact: [phone]  .    ----- Message -----  From: Desiree Forte  Sent: 9/9/2021   3:10 PM EDT  To: [facility]  Subject: RE: Visit Follow-Up Question                     Am I supposed to be seeing a gynecologist as well?    ----- Message -----  From: ERICA LEWIS  Sent: 9/9/21, 9:09 AM  To: Desiree Forte  Subject: RE: Visit Follow-Up Question    They can get you in with the surgeon today @ 3:00 - if that works for you      ----- Message -----       From:Desiree Forte       Sent:9/9/2021  8:01 AM EDT         To:EMILY Miguel    Subject:Visit Follow-Up Question    So can this be done in [place]?

## 2021-09-13 ENCOUNTER — TELEPHONE (OUTPATIENT)
Dept: GASTROENTEROLOGY | Facility: CLINIC | Age: 81
End: 2021-09-13

## 2021-09-13 DIAGNOSIS — A04.72 COLITIS, CLOSTRIDIUM DIFFICILE: Primary | ICD-10-CM

## 2021-09-13 RX ORDER — VANCOMYCIN HYDROCHLORIDE 125 MG/1
CAPSULE ORAL
Qty: 14 CAPSULE | Refills: 0 | Status: SHIPPED | OUTPATIENT
Start: 2021-09-13 | End: 2021-10-04

## 2021-09-13 NOTE — TELEPHONE ENCOUNTER
PATIENT IS SCHEDULED FOR 09.22.21 @ 9AM [facility] [place]. NPO 4 HOURS PRIOR. PATIENT IS AWARE OF APPT.

## 2021-09-13 NOTE — TELEPHONE ENCOUNTER
Patient is scheduled with Dr. Resendez on 09.24.21 @ 9:40am at [facility].     Just wanted to let you know.     Patient is also wanting to know if patient needs ct scan still since she has an appt to have surgery with Dr. Jaimes.

## 2021-09-14 ENCOUNTER — HOSPITAL ENCOUNTER (OUTPATIENT)
Dept: CARDIOLOGY | Facility: HOSPITAL | Age: 81
Discharge: HOME | End: 2021-09-14

## 2021-09-14 ENCOUNTER — ANESTHESIA EVENT (OUTPATIENT)
Dept: PERIOP | Facility: HOSPITAL | Age: 81
End: 2021-09-14

## 2021-09-14 ENCOUNTER — PRE-ADMISSION TESTING (OUTPATIENT)
Dept: PREADMISSION TESTING | Facility: HOSPITAL | Age: 81
End: 2021-09-14

## 2021-09-14 VITALS
HEIGHT: 64 IN | BODY MASS INDEX: 18.78 KG/M2 | WEIGHT: 110 LBS | DIASTOLIC BLOOD PRESSURE: 62 MMHG | SYSTOLIC BLOOD PRESSURE: 137 MMHG

## 2021-09-14 DIAGNOSIS — I10 ESSENTIAL HYPERTENSION: ICD-10-CM

## 2021-09-14 LAB — SARS-COV-2 RNA PNL SPEC NAA+PROBE: NOT DETECTED

## 2021-09-14 PROCEDURE — U0004 COV-19 TEST NON-CDC HGH THRU: HCPCS

## 2021-09-14 PROCEDURE — 93975 VASCULAR STUDY: CPT

## 2021-09-14 PROCEDURE — C9803 HOPD COVID-19 SPEC COLLECT: HCPCS

## 2021-09-14 PROCEDURE — U0005 INFEC AGEN DETEC AMPLI PROBE: HCPCS

## 2021-09-14 RX ORDER — FAMOTIDINE 10 MG/ML
20 INJECTION, SOLUTION INTRAVENOUS ONCE
Status: CANCELLED | OUTPATIENT
Start: 2021-09-14 | End: 2021-09-14

## 2021-09-14 RX ORDER — SODIUM CHLORIDE 0.9 % (FLUSH) 0.9 %
10 SYRINGE (ML) INJECTION AS NEEDED
Status: CANCELLED | OUTPATIENT
Start: 2021-09-14

## 2021-09-14 RX ORDER — SODIUM CHLORIDE 0.9 % (FLUSH) 0.9 %
10 SYRINGE (ML) INJECTION EVERY 12 HOURS SCHEDULED
Status: CANCELLED | OUTPATIENT
Start: 2021-09-14

## 2021-09-15 ENCOUNTER — ANESTHESIA (OUTPATIENT)
Dept: PERIOP | Facility: HOSPITAL | Age: 81
End: 2021-09-15

## 2021-09-15 ENCOUNTER — HOSPITAL ENCOUNTER (OUTPATIENT)
Facility: HOSPITAL | Age: 81
Setting detail: HOSPITAL OUTPATIENT SURGERY
Discharge: HOME | End: 2021-09-15
Attending: SURGERY | Admitting: SURGERY

## 2021-09-15 VITALS
BODY MASS INDEX: 18.78 KG/M2 | WEIGHT: 110 LBS | OXYGEN SATURATION: 92 % | SYSTOLIC BLOOD PRESSURE: 147 MMHG | DIASTOLIC BLOOD PRESSURE: 57 MMHG | HEIGHT: 64 IN | RESPIRATION RATE: 16 BRPM | HEART RATE: 67 BPM | TEMPERATURE: 97.7 F

## 2021-09-15 DIAGNOSIS — K66.8 OMENTAL MASS: ICD-10-CM

## 2021-09-15 LAB
ANION GAP SERPL CALCULATED.3IONS-SCNC: 10 MMOL/L (ref 5–15)
BUN SERPL-MCNC: 27 MG/DL (ref 8–23)
BUN/CREAT SERPL: 29.3 (ref 7–25)
CALCIUM SPEC-SCNC: 10 MG/DL (ref 8.6–10.5)
CHLORIDE SERPL-SCNC: 109 MMOL/L (ref 98–107)
CO2 SERPL-SCNC: 30 MMOL/L (ref 22–29)
CREAT SERPL-MCNC: 0.92 MG/DL (ref 0.57–1)
DEPRECATED RDW RBC AUTO: 52.5 FL (ref 37–54)
ERYTHROCYTE [DISTWIDTH] IN BLOOD BY AUTOMATED COUNT: 16.6 % (ref 12.3–15.4)
GFR SERPL CREATININE-BSD FRML MDRD: 59 ML/MIN/1.73
GLUCOSE SERPL-MCNC: 64 MG/DL (ref 65–99)
HCT VFR BLD AUTO: 34.3 % (ref 34–46.6)
HGB BLD-MCNC: 10.7 G/DL (ref 12–15.9)
INR PPP: 1.08 (ref 0.85–1.16)
MCH RBC QN AUTO: 27.2 PG (ref 26.6–33)
MCHC RBC AUTO-ENTMCNC: 31.2 G/DL (ref 31.5–35.7)
MCV RBC AUTO: 87.1 FL (ref 79–97)
PLATELET # BLD AUTO: 375 10*3/MM3 (ref 140–450)
PMV BLD AUTO: 11 FL (ref 6–12)
POTASSIUM SERPL-SCNC: 4.3 MMOL/L (ref 3.5–5.2)
PROTHROMBIN TIME: 13.7 SECONDS (ref 11.4–14.4)
RBC # BLD AUTO: 3.94 10*6/MM3 (ref 3.77–5.28)
SODIUM SERPL-SCNC: 149 MMOL/L (ref 136–145)
WBC # BLD AUTO: 6.67 10*3/MM3 (ref 3.4–10.8)

## 2021-09-15 PROCEDURE — 88112 CYTOPATH CELL ENHANCE TECH: CPT | Performed by: SURGERY

## 2021-09-15 PROCEDURE — 88341 IMHCHEM/IMCYTCHM EA ADD ANTB: CPT

## 2021-09-15 PROCEDURE — 25010000002 ONDANSETRON PER 1 MG: Performed by: NURSE ANESTHETIST, CERTIFIED REGISTERED

## 2021-09-15 PROCEDURE — 25010000003 LIDOCAINE 1 % SOLUTION: Performed by: NURSE ANESTHETIST, CERTIFIED REGISTERED

## 2021-09-15 PROCEDURE — 25010000003 CEFAZOLIN IN DEXTROSE 2-4 GM/100ML-% SOLUTION: Performed by: SURGERY

## 2021-09-15 PROCEDURE — 85027 COMPLETE CBC AUTOMATED: CPT | Performed by: SURGERY

## 2021-09-15 PROCEDURE — 25010000002 PROPOFOL 10 MG/ML EMULSION: Performed by: NURSE ANESTHETIST, CERTIFIED REGISTERED

## 2021-09-15 PROCEDURE — 88305 TISSUE EXAM BY PATHOLOGIST: CPT | Performed by: SURGERY

## 2021-09-15 PROCEDURE — 88342 IMHCHEM/IMCYTCHM 1ST ANTB: CPT | Performed by: SURGERY

## 2021-09-15 PROCEDURE — 80048 BASIC METABOLIC PNL TOTAL CA: CPT | Performed by: SURGERY

## 2021-09-15 PROCEDURE — 88341 IMHCHEM/IMCYTCHM EA ADD ANTB: CPT | Performed by: SURGERY

## 2021-09-15 PROCEDURE — 88331 PATH CONSLTJ SURG 1 BLK 1SPC: CPT | Performed by: PATHOLOGY

## 2021-09-15 PROCEDURE — 88360 TUMOR IMMUNOHISTOCHEM/MANUAL: CPT

## 2021-09-15 PROCEDURE — 25010000002 DEXAMETHASONE PER 1 MG: Performed by: NURSE ANESTHETIST, CERTIFIED REGISTERED

## 2021-09-15 PROCEDURE — 85610 PROTHROMBIN TIME: CPT | Performed by: SURGERY

## 2021-09-15 RX ORDER — SODIUM CHLORIDE, SODIUM LACTATE, POTASSIUM CHLORIDE, CALCIUM CHLORIDE 600; 310; 30; 20 MG/100ML; MG/100ML; MG/100ML; MG/100ML
9 INJECTION, SOLUTION INTRAVENOUS CONTINUOUS
Status: DISCONTINUED | OUTPATIENT
Start: 2021-09-15 | End: 2021-09-15 | Stop reason: HOSPADM

## 2021-09-15 RX ORDER — HYDROMORPHONE HYDROCHLORIDE 1 MG/ML
0.25 INJECTION, SOLUTION INTRAMUSCULAR; INTRAVENOUS; SUBCUTANEOUS
Status: DISCONTINUED | OUTPATIENT
Start: 2021-09-15 | End: 2021-09-15 | Stop reason: HOSPADM

## 2021-09-15 RX ORDER — HYDROCODONE BITARTRATE AND ACETAMINOPHEN 5; 325 MG/1; MG/1
1 TABLET ORAL ONCE AS NEEDED
Status: COMPLETED | OUTPATIENT
Start: 2021-09-15 | End: 2021-09-15

## 2021-09-15 RX ORDER — ONDANSETRON 2 MG/ML
INJECTION INTRAMUSCULAR; INTRAVENOUS AS NEEDED
Status: DISCONTINUED | OUTPATIENT
Start: 2021-09-15 | End: 2021-09-15 | Stop reason: SURG

## 2021-09-15 RX ORDER — OXYCODONE HYDROCHLORIDE AND ACETAMINOPHEN 5; 325 MG/1; MG/1
1 TABLET ORAL EVERY 6 HOURS PRN
Qty: 12 TABLET | Refills: 0 | Status: SHIPPED | OUTPATIENT
Start: 2021-09-15 | End: 2021-09-27

## 2021-09-15 RX ORDER — GLYCOPYRROLATE 0.2 MG/ML
INJECTION INTRAMUSCULAR; INTRAVENOUS AS NEEDED
Status: DISCONTINUED | OUTPATIENT
Start: 2021-09-15 | End: 2021-09-15 | Stop reason: SURG

## 2021-09-15 RX ORDER — FAMOTIDINE 20 MG/1
20 TABLET, FILM COATED ORAL ONCE
Status: COMPLETED | OUTPATIENT
Start: 2021-09-15 | End: 2021-09-15

## 2021-09-15 RX ORDER — CEFAZOLIN SODIUM 2 G/100ML
2 INJECTION, SOLUTION INTRAVENOUS ONCE
Status: COMPLETED | OUTPATIENT
Start: 2021-09-15 | End: 2021-09-15

## 2021-09-15 RX ORDER — MAGNESIUM HYDROXIDE 1200 MG/15ML
LIQUID ORAL AS NEEDED
Status: DISCONTINUED | OUTPATIENT
Start: 2021-09-15 | End: 2021-09-15 | Stop reason: HOSPADM

## 2021-09-15 RX ORDER — ROCURONIUM BROMIDE 10 MG/ML
INJECTION, SOLUTION INTRAVENOUS AS NEEDED
Status: DISCONTINUED | OUTPATIENT
Start: 2021-09-15 | End: 2021-09-15 | Stop reason: SURG

## 2021-09-15 RX ORDER — BUPIVACAINE HCL/0.9 % NACL/PF 0.125 %
PLASTIC BAG, INJECTION (ML) EPIDURAL AS NEEDED
Status: DISCONTINUED | OUTPATIENT
Start: 2021-09-15 | End: 2021-09-15 | Stop reason: SURG

## 2021-09-15 RX ORDER — LABETALOL HYDROCHLORIDE 5 MG/ML
5 INJECTION, SOLUTION INTRAVENOUS
Status: DISCONTINUED | OUTPATIENT
Start: 2021-09-15 | End: 2021-09-15 | Stop reason: HOSPADM

## 2021-09-15 RX ORDER — FENTANYL CITRATE 50 UG/ML
25 INJECTION, SOLUTION INTRAMUSCULAR; INTRAVENOUS
Status: DISCONTINUED | OUTPATIENT
Start: 2021-09-15 | End: 2021-09-15 | Stop reason: HOSPADM

## 2021-09-15 RX ORDER — BUPIVACAINE HYDROCHLORIDE AND EPINEPHRINE 2.5; 5 MG/ML; UG/ML
INJECTION, SOLUTION EPIDURAL; INFILTRATION; INTRACAUDAL; PERINEURAL AS NEEDED
Status: DISCONTINUED | OUTPATIENT
Start: 2021-09-15 | End: 2021-09-15 | Stop reason: HOSPADM

## 2021-09-15 RX ORDER — CEFAZOLIN SODIUM 2 G/100ML
2 INJECTION, SOLUTION INTRAVENOUS ONCE
Status: DISCONTINUED | OUTPATIENT
Start: 2021-09-15 | End: 2021-09-15 | Stop reason: SDUPTHER

## 2021-09-15 RX ORDER — ONDANSETRON 2 MG/ML
4 INJECTION INTRAMUSCULAR; INTRAVENOUS ONCE AS NEEDED
Status: DISCONTINUED | OUTPATIENT
Start: 2021-09-15 | End: 2021-09-15 | Stop reason: HOSPADM

## 2021-09-15 RX ORDER — HEPARIN SODIUM 5000 [USP'U]/ML
5000 INJECTION, SOLUTION INTRAVENOUS; SUBCUTANEOUS ONCE
Status: DISCONTINUED | OUTPATIENT
Start: 2021-09-15 | End: 2021-09-15 | Stop reason: HOSPADM

## 2021-09-15 RX ORDER — LIDOCAINE HYDROCHLORIDE 10 MG/ML
0.5 INJECTION, SOLUTION EPIDURAL; INFILTRATION; INTRACAUDAL; PERINEURAL ONCE AS NEEDED
Status: COMPLETED | OUTPATIENT
Start: 2021-09-15 | End: 2021-09-15

## 2021-09-15 RX ORDER — LIDOCAINE HYDROCHLORIDE 10 MG/ML
INJECTION, SOLUTION INFILTRATION; PERINEURAL AS NEEDED
Status: DISCONTINUED | OUTPATIENT
Start: 2021-09-15 | End: 2021-09-15 | Stop reason: SURG

## 2021-09-15 RX ORDER — PROPOFOL 10 MG/ML
VIAL (ML) INTRAVENOUS AS NEEDED
Status: DISCONTINUED | OUTPATIENT
Start: 2021-09-15 | End: 2021-09-15 | Stop reason: SURG

## 2021-09-15 RX ORDER — MIDAZOLAM HYDROCHLORIDE 1 MG/ML
0.5 INJECTION INTRAMUSCULAR; INTRAVENOUS
Status: DISCONTINUED | OUTPATIENT
Start: 2021-09-15 | End: 2021-09-15 | Stop reason: HOSPADM

## 2021-09-15 RX ORDER — DEXAMETHASONE SODIUM PHOSPHATE 4 MG/ML
INJECTION, SOLUTION INTRA-ARTICULAR; INTRALESIONAL; INTRAMUSCULAR; INTRAVENOUS; SOFT TISSUE AS NEEDED
Status: DISCONTINUED | OUTPATIENT
Start: 2021-09-15 | End: 2021-09-15 | Stop reason: SURG

## 2021-09-15 RX ADMIN — ONDANSETRON 4 MG: 2 INJECTION INTRAMUSCULAR; INTRAVENOUS at 08:01

## 2021-09-15 RX ADMIN — Medication 100 MCG: at 07:30

## 2021-09-15 RX ADMIN — PROPOFOL 20 MG: 10 INJECTION, EMULSION INTRAVENOUS at 07:30

## 2021-09-15 RX ADMIN — DEXAMETHASONE SODIUM PHOSPHATE 4 MG: 4 INJECTION, SOLUTION INTRA-ARTICULAR; INTRALESIONAL; INTRAMUSCULAR; INTRAVENOUS; SOFT TISSUE at 07:42

## 2021-09-15 RX ADMIN — Medication 100 MCG: at 07:37

## 2021-09-15 RX ADMIN — LIDOCAINE HYDROCHLORIDE 50 MG: 10 INJECTION, SOLUTION INFILTRATION; PERINEURAL at 07:28

## 2021-09-15 RX ADMIN — SODIUM CHLORIDE, POTASSIUM CHLORIDE, SODIUM LACTATE AND CALCIUM CHLORIDE 9 ML/HR: 600; 310; 30; 20 INJECTION, SOLUTION INTRAVENOUS at 07:04

## 2021-09-15 RX ADMIN — GLYCOPYRROLATE 0.1 MG: 0.2 INJECTION INTRAMUSCULAR; INTRAVENOUS at 07:41

## 2021-09-15 RX ADMIN — PROPOFOL 100 MG: 10 INJECTION, EMULSION INTRAVENOUS at 07:28

## 2021-09-15 RX ADMIN — SUGAMMADEX 150 MG: 100 INJECTION, SOLUTION INTRAVENOUS at 08:14

## 2021-09-15 RX ADMIN — Medication 100 MCG: at 07:28

## 2021-09-15 RX ADMIN — ROCURONIUM BROMIDE 50 MG: 10 INJECTION, SOLUTION INTRAVENOUS at 07:30

## 2021-09-15 RX ADMIN — LIDOCAINE HYDROCHLORIDE 0.4 ML: 10 INJECTION, SOLUTION EPIDURAL; INFILTRATION; INTRACAUDAL; PERINEURAL at 07:03

## 2021-09-15 RX ADMIN — CEFAZOLIN SODIUM 2 G: 2 INJECTION, SOLUTION INTRAVENOUS at 07:23

## 2021-09-15 RX ADMIN — FAMOTIDINE 20 MG: 20 TABLET, FILM COATED ORAL at 07:03

## 2021-09-15 RX ADMIN — HYDROCODONE BITARTRATE AND ACETAMINOPHEN 1 TABLET: 5; 325 TABLET ORAL at 09:02

## 2021-09-15 NOTE — OP NOTE
Operative Report    Patient Name:  Desiree Forte  YOB: 1940  [id number]    9/15/2021      PREOPERATIVE DIAGNOSIS: Omental nodularity concerning for malignancy      POSTOPERATIVE DIAGNOSIS: Same, carcinomatosis       PROCEDURE PERFORMED:     1. Diagnostic laparoscopy with omental and peritoneal nodule biopsy       SURGEON: Nino Jaimes MD      ASSISTANT:    None       SPECIMENS: Peritoneal fluid for cytology, omental biopsy, peritoneal nodule biopsy      ANESTHESIA: General.        FINDINGS:  1.  There was hemorrhagic ascites noted on entrance into the abdominal cavity.  There was diffuse omental nodularity with implants along multiple portions of the omentum as well as the peritoneum in all abdominal quadrants.  There were adhesions between the omentum and small bowel to the lower abdominal midline, and the pelvis could not be visualized   2.  Peritoneal fluid was collected for cytology  3.  A biopsy of a portion of greater omentum from the right lower quadrant was taken and sent for frozen section.  Discussion with Dr. Street of pathology demonstrated that this was a malignancy, however etiology uncertain at this time.  He felt that there was adequate tissue for further analysis  4.  There was a peritoneal nodule in the right upper quadrant which was also taken for biopsy and sent for permanent pathology       INDICATIONS:      This patient is a 81 y.o. female with a history of generalized abdominal complaints with recently undergone a CT scan which was concerning for omental nodularity and advanced malignancy.  She underwent panendoscopy which did not demonstrate evidence of an etiology.  She was therefore referred to my office for further testing.. Pre-operative imaging including CT scan confirmed the diagnosis. The risks, benefits, and alternatives of the procedure were discussed with the patient and their family preoperatively and they agreed to proceed.          DESCRIPTION  OF PROCEDURE:      After obtaining informed consent, the patient was taken to the operating room and placed in the supine position on the operating room table. General anesthesia was initiated. The abdomen was prepped and draped in the usual sterile fashion. A time-out was properly performed. Antibiotics were given preoperatively. SCDs were properly placed on the patient and turned on.      Local anesthetic was injected prior to all incisions.  A vertical incision was made 2 to 3 cm above the umbilicus using 11 blade scalpel.  Dissection was carried down bluntly to the level of the anterior abdominal wall fascia.  The anterior abdominal wall fascia was incised sharply and stay sutures of 0 Vicryl placed on either side of this.  The peritoneal cavity was then entered under direct visualization.  A 12 mm Lockhart trocar was then advanced into the peritoneal cavity and pneumoperitoneum was established to 15 mmHg.  The abdomen was then surveyed with a 10 mm 30 degree laparoscope.  Laparoscopic examination of the abdomen demonstrated adhesions between the omentum and small bowel in the lower midline, however we were above this and there was no evidence of entrance injury.  There was diffuse omental nodularity and implants along essentially all portions of the omentum and in all 4 quadrants of the peritoneal cavity.  The adhesions in the lower midline prevented visualization of the pelvis.  2 additional 5 mm trochars were placed in the right lower quadrant under direct visualization.  Using a suction  device, some of the hemorrhagic ascites in the right upper quadrant was suctioned free and this was passed off the field for cytology.  Next we identified a portion of the greater omentum in the right lower quadrant which was free of the surrounding bowel.  Using a [place] LigaSure device we ligated a portion of the omentum that was approximately 3 x 3 cm.  The specimen was removed from the abdominal cavity using  Endo Catch bag through the periumbilical trocar site.  Specimen was passed off the field for frozen section.  Following this we identified one of the peritoneal nodules in the right upper quadrant, similarly this was dissected free of the abdominal wall using the [place] LigaSure device until an approximately 2 x 2 centimeter nodule was excised.  This was removed from the abdominal cavity through the periumbilical trocar site utilizing Endo Catch bag.  The specimen was passed off the field for permanent pathology.  Hemostasis of all operative sites was confirmed.  Laparoscopic examination of the visible portions of the upper abdomen demonstrated no obvious masses of the liver, anterior body of the stomach, or visible portions of the transverse colon or small bowel.     I discussed the frozen pathology with Dr. Street.  He felt that the specimen was adequate in terms of tissue amount, and represented a definite malignancy with etiology uncertain until further analysis can be performed.  Given these findings we elected to then proceed with abdominal closure.  Hemostasis of all operative sites was again confirmed.  There was still quite a bit of hemorrhagic ascites fluid in the abdomen.  The 5 mm trochars were removed under direct laparoscopic visualization and pneumoperitoneum was released.  The 12 mm trocar was removed and the fascia was closed under direct visualization using a figure-of-eight 0 Vicryl suture.  All wound sites were closed with 4 Monocryl in subcuticular layer.  Mastisol Steri-Strips were applied to each wound site with a clean dry dressing overlying this.     After the procedure, the patient was awakened, extubated, and taken to the postoperative anesthesia care unit for recovery. All needle, instrument, and lap counts were correct. I was personally present and performed all portions of the procedure. There were no immediate complications         Nino Jaimes MD  9/15/2021  08:20 EDT

## 2021-09-15 NOTE — ANESTHESIA POSTPROCEDURE EVALUATION
Patient: Desiree Forte    Procedure Summary     Date: 09/15/21 Room / Location:  [facility]    Anesthesia Start: 0723 Anesthesia Stop:     Procedure: DIAGNOSTIC LAPAROSCOPY, OMENTAL BIOPSY AND PERITONEAL NODULE BIOPSY (N/A Abdomen) Diagnosis:     Surgeons: Nino Jaimes MD Provider: Adarsh Noyola MD    Anesthesia Type: general ASA Status: 3          Anesthesia Type: general    Vitals  No vitals data found for the desired time range.          Post Anesthesia Care and Evaluation    Patient location during evaluation: PACU  Patient participation: complete - patient participated  Level of consciousness: awake and alert  Pain management: adequate  Airway patency: patent  Anesthetic complications: No anesthetic complications  PONV Status: none  Cardiovascular status: hemodynamically stable and acceptable  Respiratory status: nonlabored ventilation, acceptable and nasal cannula  Hydration status: acceptable

## 2021-09-15 NOTE — ANESTHESIA PROCEDURE NOTES
Airway  Urgency: elective    Date/Time: 9/15/2021 7:32 AM  Airway not difficult    General Information and Staff    Patient location during procedure: OR    Indications and Patient Condition  Indications for airway management: airway protection    Preoxygenated: yes  MILS not maintained throughout  Mask difficulty assessment: 1 - vent by mask    Final Airway Details  Final airway type: endotracheal airway      Successful airway: ETT  Cuffed: yes   Successful intubation technique: direct laryngoscopy  Facilitating devices/methods: anterior pressure/BURP  Endotracheal tube insertion site: oral  Blade: Christina  Blade size: 3  ETT size (mm): 7.0  Cormack-Lehane Classification: grade IIa - partial view of glottis  Placement verified by: chest auscultation and capnometry   Measured from: lips  ETT/EBT  to lips (cm): 20  Number of attempts at approach: 1  Assessment: lips, teeth, and gum same as pre-op and atraumatic intubation    Additional Comments  Negative epigastric sounds, Breath sound equal bilaterally with symmetric chest rise and fall

## 2021-09-15 NOTE — ANESTHESIA PREPROCEDURE EVALUATION
" Anesthesia Evaluation     Patient summary reviewed and Nursing notes reviewed                Airway   Mallampati: II  TM distance: >3 FB  Neck ROM: full  No difficulty expected  Dental - normal exam         Pulmonary - negative pulmonary ROS and normal exam   Cardiovascular - normal exam    (+) hypertension, dysrhythmias PVC, CHF (asymptomatic recently) , BRUNO, hyperlipidemia,     ROS comment:   Echo 7/21: EF 45-50%    \"cleared\" for surgery by cardiology    Neuro/Psych- negative ROS  GI/Hepatic/Renal/Endo - negative ROS     Musculoskeletal (-) negative ROS    Abdominal  - normal exam    Bowel sounds: normal.   Substance History - negative use     OB/GYN negative ob/gyn ROS         Other                      Anesthesia Plan    ASA 3     general     intravenous induction     Anesthetic plan, all risks, benefits, and alternatives have been provided, discussed and informed consent has been obtained with: patient.    Plan discussed with CRNA.      "

## 2021-09-15 NOTE — DISCHARGE INSTRUCTIONS
Okay for discharge when appropriate per PACU protocol.  Regular diet and activity as tolerated.  Please remind patient no lifting greater than 10 lbs for 2 weeks.   Rx provided for Percocet. Please instruct patient to transition to over the counter Tylenol and Ibuprofen as able.   Please remind patient to continue with daily stool softener. Rx provided for Docusate BID.   Ok to shower in 24 hrs. Let warm soapy water run over wounds. Pat dry do not scrub. Do not soak in tub bathe for 2 weeks.   Please instruction patient to call the [facility] [phone] if fever greater than 101.5, increased redness or drainage from wound sites, or worsening nausea or vomiting.

## 2021-09-15 NOTE — INTERVAL H&P NOTE
"[facility] Pre-op    Full history and physical note from office is up to date.     /76 (BP Location: Right arm, Patient Position: Lying)   Pulse 81   Temp 96.8 °F (36 °C) (Tympanic)   Resp 16   Ht 162.6 cm (64\")   Wt 49.9 kg (110 lb)   SpO2 93%   BMI 18.88 kg/m²       LAB Results:  Lab Results   Component Value Date    WBC 6.67 09/15/2021    HGB 10.7 (L) 09/15/2021    HCT 34.3 09/15/2021    MCV 87.1 09/15/2021     09/15/2021    NEUTROABS 4.67 09/03/2021    GLUCOSE 88 09/03/2021    BUN 31 (H) 09/03/2021    CREATININE 1.10 (H) 09/03/2021    EGFRIFNONA 48 (L) 09/03/2021     09/03/2021    K 4.5 09/03/2021     09/03/2021    CO2 27.0 09/03/2021    MG 2.2 08/10/2021    CALCIUM 10.3 09/03/2021    ALBUMIN 4.10 09/03/2021    AST 31 09/03/2021    ALT 15 09/03/2021    BILITOT 0.2 09/03/2021     *I have reviewed the patient's recent clinical results.  *9/15/21 Cardiac clearance received from Dr. Saucedo and in patient's chart.    Cancer Staging (if applicable)  Cancer Patient: __ yes __no _X_unknown__N/A; If yes, clinical stage T:__ N:__M:__, stage group or __N/A    Assessment: Omental mass    Plan: Diagnostic laparoscopy, omental biopsy      Jeanne Lopez, APRN 9/15/2021 07:07 EDT   "

## 2021-09-20 ENCOUNTER — TRANSCRIBE ORDERS (OUTPATIENT)
Dept: ADMINISTRATIVE | Facility: HOSPITAL | Age: 81
End: 2021-09-20

## 2021-09-20 DIAGNOSIS — C22.0 HEPATOCELLULAR CARCINOMA (HCC): Primary | ICD-10-CM

## 2021-09-20 LAB
BH CV ECHO MEAS - BSA(HAYCOCK): 1.5 M^2
BH CV ECHO MEAS - BSA: 1.5 M^2
BH CV ECHO MEAS - BZI_BMI: 18.9 KILOGRAMS/M^2
BH CV ECHO MEAS - BZI_METRIC_HEIGHT: 162.6 CM
BH CV ECHO MEAS - BZI_METRIC_WEIGHT: 49.9 KG
BH CV ECHO MEAS - DIST REN A EDV LEFT: 26 CM/SEC
BH CV ECHO MEAS - DIST REN A PSV LEFT: 222 CM/SEC
BH CV ECHO MEAS - DIST REN A RI LEFT: 0.88
BH CV ECHO MEAS - HILAR A EDV LEFT: 38.2 CM/SEC
BH CV ECHO MEAS - HILAR A PSV LEFT: 180 CM/SEC
BH CV ECHO MEAS - HILAR A RI LEFT: 0.79
BH CV ECHO MEAS - MID REN A EDV LEFT: 28 CM/SEC
BH CV ECHO MEAS - MID REN A PSV LEFT: 211 CM/SEC
BH CV ECHO MEAS - MID REN A RI LEFT: 0.87
BH CV ECHO MEAS - PROX REN A EDV LEFT: 32 CM/SEC
BH CV ECHO MEAS - PROX REN A PSV LEFT: 211 CM/SEC
BH CV ECHO MEAS - PROX REN A RI LEFT: 0.85
BH CV VAS BP RIGHT ARM: NORMAL MMHG
BH CV VAS KIDNEY HEIGHT LEFT: 5 CM
BH CV VAS RENAL AORTIC MID PSV: 121 CM/S
BH CV VAS RENAL HILUM LEFT EDV: 38 CM/S
BH CV VAS RENAL HILUM LEFT PSV: 180 CM/S
BH CV VAS RENAL HILUM RIGHT EDV: 20 CM/S
BH CV VAS RENAL HILUM RIGHT PSV: 160 CM/S
BH CV XCLRA SUP ARC RI LEFT: 0.71
BH CV XLRA MEAS - KID L LEFT: 10.4 CM
BH CV XLRA MEAS - RENAL A ORG RI LEFT: 0.88
BH CV XLRA MEAS - SUP ARC PSV LEFT: 50.4 CM/SEC
BH CV XLRA MEAS - SUP REN AO PSV: 121 CM/SEC
BH CV XLRA MEAS - SUP SEG EDV LEFT: 12.7 CM/SEC
BH CV XLRA MEAS - SUP SEG PSV LEFT: 106 CM/SEC
BH CV XLRA MEAS - SUP SEG RI LEFT: 0.88
BH CV XLRA MEAS DIST REN A EDV RIGHT: 26 CM/SEC
BH CV XLRA MEAS DIST REN A PSV RIGHT: 134 CM/SEC
BH CV XLRA MEAS DIST REN A RI RIGHT: 0.81
BH CV XLRA MEAS HILAR A EDV RIGHT: 19.6 CM/SEC
BH CV XLRA MEAS HILAR A PSV RIGHT: 160 CM/SEC
BH CV XLRA MEAS HILAR A RI RIGHT: 0.88
BH CV XLRA MEAS INF ARC EDV LEFT: 12.2 CM/SEC
BH CV XLRA MEAS INF ARC EDV RIGHT: 13.4 CM/SEC
BH CV XLRA MEAS INF ARC PSV LEFT: 50.4 CM/SEC
BH CV XLRA MEAS INF ARC PSV RIGHT: 40.9 CM/SEC
BH CV XLRA MEAS INF ARC RI LEFT: 0.76
BH CV XLRA MEAS INF ARC RI RIGHT: 0.67
BH CV XLRA MEAS INF SEG EDV LEFT: 15.3 CM/SEC
BH CV XLRA MEAS INF SEG EDV RIGHT: 13.4 CM/SEC
BH CV XLRA MEAS INF SEG PSV LEFT: 87.8 CM/SEC
BH CV XLRA MEAS INF SEG PSV RIGHT: 89.8 CM/SEC
BH CV XLRA MEAS INF SEG RI LEFT: 0.83
BH CV XLRA MEAS INF SEG RI RIGHT: 0.85
BH CV XLRA MEAS KID H RIGHT: 4.4 CM
BH CV XLRA MEAS KID L RIGHT: 10.2 CM
BH CV XLRA MEAS KID W RIGHT: 4.6 CM
BH CV XLRA MEAS MID REN A EDV RIGHT: 20 CM/SEC
BH CV XLRA MEAS MID REN A PSV RIGHT: 189 CM/SEC
BH CV XLRA MEAS MID REN A RI RIGHT: 0.9
BH CV XLRA MEAS PROX REN A EDV RIGHT: 17 CM/SEC
BH CV XLRA MEAS PROX REN A PSV RIGHT: 205 CM/SEC
BH CV XLRA MEAS PROX REN A RI RIGHT: 0.92
BH CV XLRA MEAS RAR LEFT: 1.7
BH CV XLRA MEAS RAR RIGHT: 1.7
BH CV XLRA MEAS RENAL A ORG EDV LEFT: 23 CM/SEC
BH CV XLRA MEAS RENAL A ORG EDV RIGHT: 21 CM/SEC
BH CV XLRA MEAS RENAL A ORG PSV LEFT: 197 CM/SEC
BH CV XLRA MEAS RENAL A ORG PSV RIGHT: 127 CM/SEC
BH CV XLRA MEAS RENAL A ORG RI RIGHT: 0.84
BH CV XLRA MEAS SUP ARC EDV RIGHT: 9.8 CM/SEC
BH CV XLRA MEAS SUP ARC PSV RIGHT: 55 CM/SEC
BH CV XLRA MEAS SUP ARC RI RIGHT: 0.82
BH CV XLRA MEAS SUP SEG EDV RIGHT: 25.7 CM/SEC
BH CV XLRA MEAS SUP SEG PSV RIGHT: 115 CM/SEC
BH CV XLRA MEAS SUP SEG RI RIGHT: 0.78
BH CV XLRA SUP ARC EDV LEFT: 14.5 CM/SEC
LAB AP CASE REPORT: NORMAL
LEFT KIDNEY WIDTH: 4 CM
LEFT RENAL UPPER PARENCHYMA MAX: 106 CM/S
LEFT RENAL UPPER PARENCHYMA MIN: 13 CM/S
LEFT RENAL UPPER PARENCHYMA RI: 0.88
PATH REPORT.FINAL DX SPEC: NORMAL
RIGHT RENAL UPPER PARENCHYMA MAX: 115 CM/S
RIGHT RENAL UPPER PARENCHYMA MIN: 26 CM/S
RIGHT RENAL UPPER PARENCHYMA RI: 0.77

## 2021-09-21 ENCOUNTER — TELEPHONE (OUTPATIENT)
Dept: ONCOLOGY | Facility: CLINIC | Age: 81
End: 2021-09-21

## 2021-09-21 NOTE — TELEPHONE ENCOUNTER
Caller: Harleen Duff    Relationship: Emergency Contact    Best call back number: [phone]    What is the best time to reach you: ASAP    Who are you requesting to speak with (clinical staff, provider,  specific staff member): NURSE    Do you know the name of the person who called:     What was the call regarding: PT WANTS TO KNOW IF APPT COULD BE CHANGED TO VIRTUAL, OR IF SHE CAN HAVE A VISITOR DUE TO COGNITIVE ISSUES    Do you require a callback: YES

## 2021-09-22 PROBLEM — C78.6: Status: ACTIVE | Noted: 2021-09-22

## 2021-09-22 PROBLEM — C22.0: Status: ACTIVE | Noted: 2021-09-22

## 2021-09-24 ENCOUNTER — HOSPITAL ENCOUNTER (OUTPATIENT)
Dept: CT IMAGING | Facility: HOSPITAL | Age: 81
Discharge: HOME | End: 2021-09-24
Admitting: SURGERY

## 2021-09-24 DIAGNOSIS — C22.0 HEPATOCELLULAR CARCINOMA (HCC): ICD-10-CM

## 2021-09-24 PROCEDURE — 71260 CT THORAX DX C+: CPT

## 2021-09-24 PROCEDURE — 74170 CT ABD WO CNTRST FLWD CNTRST: CPT

## 2021-09-24 PROCEDURE — 25010000002 IOPAMIDOL 61 % SOLUTION: Performed by: SURGERY

## 2021-09-24 RX ADMIN — IOPAMIDOL 95 ML: 612 INJECTION, SOLUTION INTRAVENOUS at 14:05

## 2021-09-27 ENCOUNTER — CONSULT (OUTPATIENT)
Dept: ONCOLOGY | Facility: CLINIC | Age: 81
End: 2021-09-27

## 2021-09-27 VITALS
RESPIRATION RATE: 16 BRPM | OXYGEN SATURATION: 96 % | WEIGHT: 114.8 LBS | HEIGHT: 64 IN | TEMPERATURE: 96.8 F | HEART RATE: 66 BPM | DIASTOLIC BLOOD PRESSURE: 63 MMHG | SYSTOLIC BLOOD PRESSURE: 111 MMHG | BODY MASS INDEX: 19.6 KG/M2

## 2021-09-27 DIAGNOSIS — C78.6 HEPATOCELLULAR CARCINOMA METASTATIC TO PERITONEUM (HCC): Primary | ICD-10-CM

## 2021-09-27 DIAGNOSIS — C22.0 HEPATOCELLULAR CARCINOMA METASTATIC TO PERITONEUM (HCC): Primary | ICD-10-CM

## 2021-09-27 PROCEDURE — 99205 OFFICE O/P NEW HI 60 MIN: CPT | Performed by: INTERNAL MEDICINE

## 2021-09-27 RX ORDER — SODIUM CHLORIDE 9 MG/ML
250 INJECTION, SOLUTION INTRAVENOUS ONCE
Status: CANCELLED | OUTPATIENT
Start: 2021-10-04

## 2021-09-27 RX ORDER — ONDANSETRON HYDROCHLORIDE 8 MG/1
8 TABLET, FILM COATED ORAL 3 TIMES DAILY PRN
Qty: 30 TABLET | Refills: 5 | Status: ON HOLD | OUTPATIENT
Start: 2021-09-27 | End: 2022-07-14

## 2021-09-27 NOTE — PROGRESS NOTES
ID: 81 y.o. year old female from [facility] [id number]    PCP: Kristofer Elder MD    REFERRING PHYSICIAN: Nino AGUILAR MD    Reason for Consultation: Metastatic hepatocellular carcinoma    Dear Dr. Jaimes    It is a pleasure to meet Ms. Forte today.  She is a very pleasant 81-year-old lady who presents today for consultation due to recent diagnosis of a hepatocellular carcinoma.  She presented with worsening ascites and abdominal distention.  She was noted to have likely carcinomatosis with omental caking present.  She underwent a recent biopsy that showed a carcinoma with hepatocellular differentiation.  She presents today to discuss her new diagnosis and options going forward.  She is in reasonable health otherwise.  Though she has become increasingly frail after losing about 20 pounds over the last 6 months.  She has more GI symptoms.  She also has distention which is somewhat uncomfortable.  She underwent paracentesis with seem to relieve some of her symptoms.      Past Medical History:   Diagnosis Date   • Abnormal heart rhythm    • Cancer (CMS/HCC)     breast   • Clostridioides difficile diarrhea    • Congestive heart failure (CHF) (CMS/HCC)    • Hyperlipidemia    • Irregular heart beat    • Migraine     occular        Past Surgical History:   Procedure Laterality Date   • APPENDECTOMY     • BREAST SURGERY      lumpectomy with lymph nodes left    • COLONOSCOPY     • COLONOSCOPY N/A 9/7/2021    Procedure: COLONOSCOPY;  Surgeon: Jesus Morton MD;  Location: [facility] ENDOSCOPY;  Service: Gastroenterology;  Laterality: N/A;   • DIAGNOSTIC LAPAROSCOPY N/A 9/15/2021    Procedure: DIAGNOSTIC LAPAROSCOPY, OMENTAL BIOPSY AND PERITONEAL NODULE BIOPSY;  Surgeon: Nino Jaimes MD;  Location: [facility] OR;  Service: General;  Laterality: N/A;   • ENDOSCOPY N/A 9/7/2021    Procedure: ESOPHAGOGASTRODUODENOSCOPY WITH BIOPSY AND POLYPECTOMY;  Surgeon: Jesus Morton MD;  Location:  [facility] ENDOSCOPY;  Service:  Gastroenterology;  Laterality: N/A;   • EYE SURGERY Bilateral     cataract   • HYSTERECTOMY         Social History     Socioeconomic History   • Marital status:      Spouse name: Not on file   • Number of children: Not on file   • Years of education: Not on file   • Highest education level: Not on file   Tobacco Use   • Smoking status: Never Smoker   • Smokeless tobacco: Never Used   Vaping Use   • Vaping Use: Never used   Substance and Sexual Activity   • Alcohol use: Not Currently   • Drug use: Never   • Sexual activity: Defer       Family History   Problem Relation Age of Onset   • Leukemia Mother    • Stroke Father    • Colon cancer Neg Hx    • Colon polyps Neg Hx    • Esophageal cancer Neg Hx        Review of Systems:    16 point review of systems was performed and reviewed and scanned into the EMR    Review of Systems - Oncology      Current Outpatient Medications:   •  carvedilol (COREG) 6.25 MG tablet, Take 1 tablet by mouth 2 (Two) Times a Day., Disp: 180 tablet, Rfl: 3  •  fenofibrate (TRICOR) 145 MG tablet, Take 1 tablet by mouth Daily., Disp: 90 tablet, Rfl: 3  •  losartan (COZAAR) 25 MG tablet, Take 1 tablet by mouth 2 (Two) Times a Day., Disp: 180 tablet, Rfl: 3  •  torsemide (DEMADEX) 10 MG tablet, Take 1 tablet by mouth Daily., Disp: 90 tablet, Rfl: 3  •  vancomycin (VANCOCIN) 125 MG capsule, Take 1 cap daily for 7 days, then 1 cap every 2 days for 2 weeks  Indications: Clostridium Difficile Infection, Disp: 14 capsule, Rfl: 0  •  ondansetron (ZOFRAN) 8 MG tablet, Take 1 tablet by mouth 3 (Three) Times a Day As Needed for Nausea or Vomiting., Disp: 30 tablet, Rfl: 5         Allergies   Allergen Reactions   • Metformin Diarrhea   • Statins Myalgia     Muscle pain         ECOG score: 3           Objective     Vitals:    09/27/21 1046   BP: 111/63   Pulse: 66   Resp: 16   Temp: 96.8 °F (36 °C)   SpO2: 96%     Body mass index is 19.71 kg/m².  Body surface area is 1.55 meters squared.         09/27/21  1046   Weight: 52.1 kg (114 lb 12.8 oz)     Pain Score    09/27/21 1046   PainSc: 0-No pain          Physical Exam    General: elderly frail appearing, in no acute distress  HEENT: sclera anicteric,  neck is supple  Lymphatics: no cervical, supraclavicular, or axillary adenopathy  Cardiovascular: regular rate and rhythm, no murmurs, rubs or gallops  Lungs: clear to auscultation bilaterally  Abdomen: distended  Extremities: no lower extremity edema  Skin: no rashes, lesions, bruising, or petechiae  Msk:  Shows no weakness of the large muscle groups  Psych: Mood is stable        Lab Results   Component Value Date    GLUCOSE 64 (L) 09/15/2021    BUN 27 (H) 09/15/2021    CREATININE 0.92 09/15/2021     (H) 09/15/2021    K 4.3 09/15/2021     (H) 09/15/2021    CO2 30.0 (H) 09/15/2021    CALCIUM 10.0 09/15/2021    PROTEINTOT 6.5 09/03/2021    ALBUMIN 4.10 09/03/2021    BILITOT 0.2 09/03/2021    ALKPHOS 53 09/03/2021    AST 31 09/03/2021    ALT 15 09/03/2021       Lab Results   Component Value Date    HGB 10.7 (L) 09/15/2021    HCT 34.3 09/15/2021    MCV 87.1 09/15/2021     09/15/2021    WBC 6.67 09/15/2021    NEUTROABS 4.67 09/03/2021    LYMPHSABS 1.05 09/03/2021    MONOSABS 0.70 09/03/2021    EOSABS 0.17 09/03/2021    BASOSABS 0.06 09/03/2021       CT Chest With Contrast Diagnostic    Result Date: 9/24/2021  1. Areas of opacification midlungs bilateral right greater than left most consistent with postinfectious or postinflammatory etiology although intermixed micronodular appearance not entirely excluding pulmonary involvement and metastasis with potential follow-up. No dominant nodule or mass however  2. Omental caking and mesenteric nodularity/reticulation consistent with peritoneal disease process potentially metastatic with no definitive evidence for cirrhotic hepatic morphology evident only a flash fill benign hemangioma the right hepatic lobe without separate area of hepatic cell  carcinoma or washout characteristics is identified. Perihepatic ascites. Partially visualized GI tract unremarkable  DICTATED:   09/24/2021 EDITED/ls :   09/24/2021  This report was finalized on 9/24/2021 5:45 PM by Dr. Chava Hall.      CT Abdomen With & Without Contrast    Result Date: 9/24/2021  1. Areas of opacification midlungs bilateral right greater than left most consistent with postinfectious or postinflammatory etiology although intermixed micronodular appearance not entirely excluding pulmonary involvement and metastasis with potential follow-up. No dominant nodule or mass however  2. Omental caking and mesenteric nodularity/reticulation consistent with peritoneal disease process potentially metastatic with no definitive evidence for cirrhotic hepatic morphology evident only a flash fill benign hemangioma the right hepatic lobe without separate area of hepatic cell carcinoma or washout characteristics is identified. Perihepatic ascites. Partially visualized GI tract unremarkable  DICTATED:   09/24/2021 EDITED/ls :   09/24/2021  This report was finalized on 9/24/2021 5:45 PM by Dr. Chava Hall.      Duplex Renal Artery - Bilateral Complete CAR    Result Date: 9/20/2021  Parenchymal blood flow pattern seen within both kidneys. No evidence of renal artery stenosis.  D:  09/17/2021 E:  09/17/2021  This report was finalized on 9/20/2021 9:32 AM by Dr. Abeba Goyal MD.      Lab Results   Component Value Date    FINALDX  09/15/2021     PERITONEAL FLUID:  Negative for malignant cells.   Slides reviewed and diagnosis rendered by Estevan Harding M.D., F.C.A.P.   Testing performed at outside laboratory. See scanned report.         Final Diagnosis   1.  OMENTAL BIOPSY:  Involved by malignant neoplasm with evidence of hepatocellular differentiation (see comment).    2.  PERITONEAL NODULE:  Involved by malignant neoplasm with evidence of hepatocellular differentiation (see comment).   Electronically signed  by Sean Street MD on 9/20/2021 at 1050           Assessment/Plan      1.  Metastatic hepatocellular carcinoma.  I have sent her tissue for next generation sequencing to make sure this is hepatocellular in origin.  I think it is reasonable to consider treatment with Tecentriq and Avastin.  This is the first line therapy recommended for this type of cancer.  Median survival in the clinical trial is about 11 months.  However the patient is fairly frail at this point.  Without any treatment her survival is going to be between 3 and 6 months.  She is fairly symptomatic and I have offered her treatment as palliation.  If she does not tolerate it then we can consider hospice care as a way of controlling her symptoms.  She is interested in proceeding with therapy at this time though she would have a low threshold to stop treatment if she becomes symptomatic.  She will need periodic therapeutic paracentesis.  I reviewed the scans with her and her daughter and explained their cancer and treatment options and answered all the questions they had for me today.  We will see her back in my clinic in 4 weeks for cycle #2.  In the interim if she is having any issues she can always give me a call.    Total time of patient care on day of service including time prior to, face to face with patient, and following visit spent in reviewing records, lab results, imaging studies, discussion with patient, and documentation/charting was > 60 minutes.        Thank you for allowing me to participate in the care of this patient.    Yours sincerely,    Primitivo Pollock MD  [facility]  Hematology and Oncology    Return on: 10/25/21  Return in (Approximately): Schedule with next infusion, 1 month    Orders Placed This Encounter   Procedures   • Comprehensive metabolic panel     Standing Status:   Future     Standing Expiration Date:   10/4/2022     Order Specific Question:   Release to patient     Answer:   Immediate   • TSH      Standing Status:   Future     Standing Expiration Date:   10/4/2022     Order Specific Question:   Release to patient     Answer:   Immediate   • T4, free     Standing Status:   Future     Standing Expiration Date:   10/4/2022     Order Specific Question:   Release to patient     Answer:   Immediate   • Urinalysis without microscopic (no culture) - Urine, Clean Catch     Standing Status:   Future     Standing Expiration Date:   10/4/2022     Order Specific Question:   Release to patient     Answer:   Immediate   • CBC and Differential     Standing Status:   Future     Standing Expiration Date:   10/4/2022     Order Specific Question:   Manual Differential     Answer:   No     Order Specific Question:   Release to patient     Answer:   Immediate

## 2021-09-29 ENCOUNTER — TELEPHONE (OUTPATIENT)
Dept: CARDIOLOGY | Facility: CLINIC | Age: 81
End: 2021-09-29

## 2021-09-29 NOTE — TELEPHONE ENCOUNTER
"Noted; none of her medications should cause spots in her vision or \"floaters.\"  She may need to talk to her eye care provider such as optometrist or ophthalmologist in this regard.  I would increase torsemide to 20 mg daily until \"crackles\" resolve and update us no later than 1 October 2021.    Thanks!  "

## 2021-09-29 NOTE — TELEPHONE ENCOUNTER
----- Message from Desiree Forte sent at 9/29/2021  3:12 PM EDT -----  Regarding: RE: Non-Urgent Medical Question  Contact: [phone]  I am taking only the meds that Dr Saucedo gave me. By the way, does one of them have a side effect that causes spots in your vision?    You  Desiree Forte 3 hours ago (11:57 AM)   MH  What medications are you currently taking?     Thanks,   AILYN Bruce Alene Robinson Scully, Carlyle MORROW MD 4 hours ago (10:48 AM)     Morning!  I have started hearing crackles again when I’m lying down. Not bad yet but this is how I started when I had Congestive heart failure.  Since discovering cancer, my oncologist said I needed to put heart issues on the back burner.  Should I not be concerned or should I be taking action in some way?      __________________________________________  Please see above correspondence.     Pt currently taking:  Losartan 25 mg BID  Torsemide 10 mg daily  Coreg 6.25 mg BID    Please advise

## 2021-09-30 ENCOUNTER — TELEPHONE (OUTPATIENT)
Dept: CARDIOLOGY | Facility: CLINIC | Age: 81
End: 2021-09-30

## 2021-09-30 NOTE — TELEPHONE ENCOUNTER
----- Message from Desiree Forte sent at 9/29/2021  7:02 PM EDT -----  Regarding: Prescription Question  Contact: [phone]  Dr Pickens indicated I don't need to be on all the prescriptions for my heart considering my cancer diagnosis.  Can you please check with him and let me know which to stop?  ____________________________________      See pt e-mail. Please advise.

## 2021-09-30 NOTE — TELEPHONE ENCOUNTER
Noted; Dr. Pickens did not mention this at all in is recent consultation note dated 9/27/2021.  However, she can discontinue fenofibrate.    Thanks!

## 2021-10-04 ENCOUNTER — HOSPITAL ENCOUNTER (OUTPATIENT)
Dept: ONCOLOGY | Facility: HOSPITAL | Age: 81
Discharge: HOME | End: 2021-10-04

## 2021-10-04 ENCOUNTER — OFFICE VISIT (OUTPATIENT)
Dept: ONCOLOGY | Facility: CLINIC | Age: 81
End: 2021-10-04

## 2021-10-04 ENCOUNTER — DOCUMENTATION (OUTPATIENT)
Dept: NUTRITION | Facility: HOSPITAL | Age: 81
End: 2021-10-04

## 2021-10-04 ENCOUNTER — EDUCATION (OUTPATIENT)
Dept: ONCOLOGY | Facility: HOSPITAL | Age: 81
End: 2021-10-04

## 2021-10-04 ENCOUNTER — NURSE NAVIGATOR (OUTPATIENT)
Dept: ONCOLOGY | Facility: CLINIC | Age: 81
End: 2021-10-04

## 2021-10-04 ENCOUNTER — LAB (OUTPATIENT)
Dept: LAB | Facility: HOSPITAL | Age: 81
End: 2021-10-04

## 2021-10-04 ENCOUNTER — HOSPITAL ENCOUNTER (OUTPATIENT)
Dept: ONCOLOGY | Facility: HOSPITAL | Age: 81
Setting detail: INFUSION SERIES
Discharge: HOME | End: 2021-10-04

## 2021-10-04 VITALS
DIASTOLIC BLOOD PRESSURE: 50 MMHG | HEART RATE: 66 BPM | BODY MASS INDEX: 19.22 KG/M2 | OXYGEN SATURATION: 98 % | SYSTOLIC BLOOD PRESSURE: 117 MMHG | HEIGHT: 64 IN | WEIGHT: 112.6 LBS | RESPIRATION RATE: 16 BRPM | TEMPERATURE: 96 F

## 2021-10-04 DIAGNOSIS — C78.6 HEPATOCELLULAR CARCINOMA METASTATIC TO PERITONEUM (HCC): Primary | ICD-10-CM

## 2021-10-04 DIAGNOSIS — C22.0 HEPATOCELLULAR CARCINOMA METASTATIC TO PERITONEUM (HCC): Primary | ICD-10-CM

## 2021-10-04 DIAGNOSIS — C78.6 HEPATOCELLULAR CARCINOMA METASTATIC TO PERITONEUM (HCC): ICD-10-CM

## 2021-10-04 DIAGNOSIS — C22.0 HEPATOCELLULAR CARCINOMA METASTATIC TO PERITONEUM (HCC): ICD-10-CM

## 2021-10-04 LAB
ALBUMIN SERPL-MCNC: 4.1 G/DL (ref 3.5–5.2)
ALBUMIN/GLOB SERPL: 1.4 G/DL
ALP SERPL-CCNC: 57 U/L (ref 39–117)
ALPHA-FETOPROTEIN: 3324 NG/ML (ref 0–8.3)
ALT SERPL W P-5'-P-CCNC: 17 U/L (ref 1–33)
ANION GAP SERPL CALCULATED.3IONS-SCNC: 10 MMOL/L (ref 5–15)
AST SERPL-CCNC: 36 U/L (ref 1–32)
BILIRUB SERPL-MCNC: 0.5 MG/DL (ref 0–1.2)
BILIRUB UR QL STRIP: NEGATIVE
BUN SERPL-MCNC: 25 MG/DL (ref 8–23)
BUN/CREAT SERPL: 25 (ref 7–25)
CALCIUM SPEC-SCNC: 9.8 MG/DL (ref 8.6–10.5)
CHLORIDE SERPL-SCNC: 106 MMOL/L (ref 98–107)
CLARITY UR: CLEAR
CO2 SERPL-SCNC: 28 MMOL/L (ref 22–29)
COLOR UR: YELLOW
CREAT SERPL-MCNC: 1 MG/DL (ref 0.57–1)
ERYTHROCYTE [DISTWIDTH] IN BLOOD BY AUTOMATED COUNT: 19.5 % (ref 12.3–15.4)
GFR SERPL CREATININE-BSD FRML MDRD: 53 ML/MIN/1.73
GLOBULIN UR ELPH-MCNC: 2.9 GM/DL
GLUCOSE SERPL-MCNC: 120 MG/DL (ref 65–99)
GLUCOSE UR STRIP-MCNC: NEGATIVE MG/DL
HCT VFR BLD AUTO: 32.1 % (ref 34–46.6)
HGB BLD-MCNC: 10.1 G/DL (ref 12–15.9)
HGB UR QL STRIP.AUTO: NEGATIVE
KETONES UR QL STRIP: NEGATIVE
LEUKOCYTE ESTERASE UR QL STRIP.AUTO: NEGATIVE
LYMPHOCYTES # BLD AUTO: 1.1 10*3/MM3 (ref 0.7–3.1)
LYMPHOCYTES NFR BLD AUTO: 20 % (ref 19.6–45.3)
MCH RBC QN AUTO: 26.8 PG (ref 26.6–33)
MCHC RBC AUTO-ENTMCNC: 31.6 G/DL (ref 31.5–35.7)
MCV RBC AUTO: 84.9 FL (ref 79–97)
MONOCYTES # BLD AUTO: 0.2 10*3/MM3 (ref 0.1–0.9)
MONOCYTES NFR BLD AUTO: 4 % (ref 5–12)
NEUTROPHILS NFR BLD AUTO: 4 10*3/MM3 (ref 1.7–7)
NEUTROPHILS NFR BLD AUTO: 76 % (ref 42.7–76)
NITRITE UR QL STRIP: NEGATIVE
PH UR STRIP.AUTO: 5 [PH] (ref 5–8)
PLATELET # BLD AUTO: 400 10*3/MM3 (ref 140–450)
PMV BLD AUTO: 8.7 FL (ref 6–12)
POTASSIUM SERPL-SCNC: 3.8 MMOL/L (ref 3.5–5.2)
PROT SERPL-MCNC: 7 G/DL (ref 6–8.5)
PROT UR QL STRIP: NEGATIVE
RBC # BLD AUTO: 3.78 10*6/MM3 (ref 3.77–5.28)
SODIUM SERPL-SCNC: 144 MMOL/L (ref 136–145)
SP GR UR STRIP: 1.02 (ref 1–1.03)
T4 FREE SERPL-MCNC: 1.69 NG/DL (ref 0.93–1.7)
TSH SERPL DL<=0.05 MIU/L-ACNC: 5.3 UIU/ML (ref 0.27–4.2)
UROBILINOGEN UR QL STRIP: NORMAL
WBC # BLD AUTO: 5.3 10*3/MM3 (ref 3.4–10.8)

## 2021-10-04 PROCEDURE — 81003 URINALYSIS AUTO W/O SCOPE: CPT

## 2021-10-04 PROCEDURE — 96417 CHEMO IV INFUS EACH ADDL SEQ: CPT

## 2021-10-04 PROCEDURE — 80053 COMPREHEN METABOLIC PANEL: CPT

## 2021-10-04 PROCEDURE — 99214 OFFICE O/P EST MOD 30 MIN: CPT | Performed by: NURSE PRACTITIONER

## 2021-10-04 PROCEDURE — 84443 ASSAY THYROID STIM HORMONE: CPT

## 2021-10-04 PROCEDURE — 96413 CHEMO IV INFUSION 1 HR: CPT

## 2021-10-04 PROCEDURE — 25010000002 BEVACIZUMAB-AWWB 400 MG/16ML SOLUTION 16 ML VIAL: Performed by: INTERNAL MEDICINE

## 2021-10-04 PROCEDURE — 85025 COMPLETE CBC W/AUTO DIFF WBC: CPT

## 2021-10-04 PROCEDURE — 36415 COLL VENOUS BLD VENIPUNCTURE: CPT

## 2021-10-04 PROCEDURE — 84439 ASSAY OF FREE THYROXINE: CPT

## 2021-10-04 PROCEDURE — 25010000002 ATEZOLIZUMAB 1200 MG/20ML SOLUTION 20 ML VIAL: Performed by: INTERNAL MEDICINE

## 2021-10-04 PROCEDURE — 36416 COLLJ CAPILLARY BLOOD SPEC: CPT

## 2021-10-04 PROCEDURE — 82105 ALPHA-FETOPROTEIN SERUM: CPT

## 2021-10-04 RX ORDER — SODIUM CHLORIDE 9 MG/ML
250 INJECTION, SOLUTION INTRAVENOUS ONCE
Status: COMPLETED | OUTPATIENT
Start: 2021-10-04 | End: 2021-10-04

## 2021-10-04 RX ADMIN — BEVACIZUMAB-AWWB 800 MG: 400 INJECTION, SOLUTION INTRAVENOUS at 15:27

## 2021-10-04 RX ADMIN — SODIUM CHLORIDE 250 ML: 9 INJECTION, SOLUTION INTRAVENOUS at 13:45

## 2021-10-04 RX ADMIN — ATEZOLIZUMAB 1200 MG: 1200 INJECTION, SOLUTION INTRAVENOUS at 14:18

## 2021-10-04 NOTE — PLAN OF CARE
Outpatient Infusion • [address] [facility] • [address] • [place] [id number] • [phone]      CHEMOTHERAPY EDUCATION SHEET    NAME:  Desiree Forte      : 1940           DATE: 10/04/21    Booklets Given: Chemotherapy and You []  Eating Hints []    Sexuality/Fertility Books []     Chemotherapy/Biotherapy Education Sheets: (list all that apply)    Atezolizumab, Bevacizumab-awwb                                                                                                                                                              Chemotherapy Regimen:  21 day cycles with atezolizumab and bevacizumab-awwb on day 1    TOPICS EDUCATION PROVIDED EDUCATION REINFORCED COMMENTS   ANEMIA:  role of RBC, cause, s/s, ways to manage, role of transfusion [x] [] Discussed role of RBCs, their potential to be effected by therapy, and regular monitoring of blood counts. Discussed potential for fatigue during treatment.   THROMBOCYTOPENIA:  role of platelet, cause, s/s, ways to prevent bleeding, things to avoid, when to seek help [x] [] Discussed role of platelets, their potential to be effected by therapy, and regular monitoring of blood counts. Discussed potential for increased bleeding or bruising.    NEUTROPENIA:  role of WBC, cause, infection precautions, s/s of infection, when to call MD [x] [] Discussed role of WBCs, their potential to be effected by therapy, and regular monitoring of blood counts. Discussed infection precautions. Patient has received 2 doses of the COVID-19 vaccine. Discussed third shot booster. Patient has had a severe reaction to the flu vaccine previously and no longer receives the vaccine. Instructed to call in case of a temperature of 100.4 or greater.   NUTRITION & APPETITE CHANGES:  importance of maintaining healthy diet & weight, ways to manage to improve intake, dietary consult, exercise regimen [] []    DIARRHEA:  causes, s/s of dehydration, ways to manage, dietary changes, when to  call MD [x] [] Discussed potential for diarrhea. Recommended OTC loperamide as directed on the box if needed. Patient reports history of C. Diff. Instructed to call if diarrhea persists over 24 hours.    CONSTIPATION:  causes, ways to manage, dietary changes, when to call MD [x] [] Discussed potential for constipation. Recommended OTC docusate or miralax if needed.    NAUSEA & VOMITING:  cause, use of antiemetics, dietary changes, when to call MD [x] [] Discussed minimal risk for nausea. Patient has home zofran if needed. Instructed to call if zofran does not adequately control nausea.   MOUTH SORES:  causes, oral care, ways to manage [x] [] Discussed potential for mouth sores. Recommended soft bristle tooth brushes, mouthwash with baking soda salt and water.    ALOPECIA:  cause, ways to manage, resources [x] [] Discussed possibility of alopecia during therapy.   INFERTILITY & SEXUALITY:  causes, fertility preservation options, sexuality changes, ways to manage, importance of birth control [] []    NERVOUS SYSTEM CHANGES:  causes, s/s, neuropathies, cognitive changes, ways to manage [x] [] Discussed potential for peripheral neuropathy. Instructed to call if this occurs or worsens.    PAIN:  causes, ways to manage [] [] ????   SKIN & NAIL CHANGES:  cause, s/s, ways to manage [x] [] Discussed skin rashes with immune checkpoint inhibitors. Instructed to use emollients if needed.    ORGAN TOXICITIES:  cause, s/s, need for diagnostic tests, labs, when to notify MD [x] [] Discussed regular monitoring of labs to assess blood counts, organ function, electrolytes, hormone levels (TSH) and proteinuria.   SURVIVORSHIP:  distress, distress assessment, secondary malignancies, early/late effects, follow-up, social issues, social support [] []    HOME CARE:  use of spill kits, storing of PO chemo, how to manage bodily fluids [x] [] Discussed potential to excrete trace amounts of medication through the urine and stool in the 48  hours following infusions. Instructed to close the toilet an double flush, others to wear protective gloves if cleaning the toilet in this time period.    MISCELLANEOUS:  drug interactions, administration, vesicant, et [] []      Referrals:    N/A    Notes:   Discussed aforementioned material. Provided patient with the pharmacist's contact information  and instructions to call should additional questions arise. All questions and concerns addressed.  Completed a medication reconciliation. Provided appropriate educational materials and a  personalized calendar of tentative treatment cycles.     Thank you,  Frederic Campoverde  PharmD Candidate 2022  10/4/2021 10:30 AM

## 2021-10-04 NOTE — PROGRESS NOTES
"Outpatient Oncology Nutrition     Reason for Visit:     Oncology Nutrition Screening and Patient Education / met with patient during her initial infusion appointment.     Patient Name:  Desiree Forte    :  1940    MRN:  [id number]    Date of Encounter: 10/04/2021    Nutrition Assessment     Diagnosis: metastatic hepatocellular carcinoma    Chemotherapy: Tecentriq / Mvasi - every 21 days     Patient Active Problem List:    Patient Active Problem List   Diagnosis   • BRUNO (dyspnea on exertion)   • PVC's (premature ventricular contractions)   • Essential hypertension   • At risk for sleep apnea   • C. 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  She also reports being treated for Cdiff recently and that her bowel movements are much better.    Hydration Status   Discussed the importance of hydration and encouraged her to continue drinking water throughout the day.    Goal: 56-64 ounces     Enteral Feeding       Anthropometric Measurements     Height:    Ht Readings from Last 1 Encounters:   10/04/21 162.6 cm (64\")       Weight:    Wt Readings from Last 1 Encounters:   10/04/21 51.1 kg (112 lb 9.6 oz)       BMI:  19.3 - Low Normal  Usual Body Weight: ~130#    Weight Change: ~18# (13.9%) weight loss x 3 weeks (in July) per patient and weight has been stable x ~2 months     Review of Lab Data (Time Frame - 1 month / 2 month)   Labs reviewed - 10/4/21 - elevated glucose, BUN, AST noted     Medication Review   MAR reviewed     Nutrition Focused Physical Findings       Nutrition Impact Symptoms   Early satiety    Physical Activity   Not my normal self, but able to be up and about with fairly normal activities    Current Nutritional Intake     Oral diet:  Regular     Oral nutritional supplements: ~2 Boost daily     Intake: patient reports her " oral intake has been normal     Malnutrition Risk Assessment     Recent weight loss over the past 6 months:  Yes    How much weight loss:  2 = 14-23 lbs    Eating poorly because of a decreased appetite:  0 = No    Malnutrition Screening Score:     MST = 2 more Patient at risk for malnutrition     Nutrition Diagnosis     Problem Unintentional weight loss   Etiology Newly diagnosed metastatic hepatocellular carcinoma    Signs / Symptoms ~18# (13.9%) weight loss x 3 weeks per patient / weight has been stable x ~2 months      Nutrition Intervention   Discussed the importance of good nutrition during her treatment course focusing on adequate calorie, protein, nutrient and fluid intake.  Advised her to be consuming smaller more frequent meals/snacks throughout the day to aid with early satiety symptom management and potential nausea management.  Emphasized the importance of protein and its role in the diet; reviewed high protein foods; and recommended she have a protein source at each meal/snack.  Also discussed ONS and their role in the diet.  Provided samples of Boost Plus and Ensure Plus as well as coupons for both.  Encouraged her to continue drinking ONS as needed to aid with calorie and protein intake.    Goal   To achieve / maintain adequate nutritional and hydration intake during her treatment course.  To aid with nutrition impact symptom management as needed.     Monitoring / Evaluation   Answered her questions and she voiced understanding of information discussed.  RD's contact information provided and encouraged to call with questions.  Will monitor as needed during her treatment course.    Abeba Silva MS, RD, LD

## 2021-10-04 NOTE — PROGRESS NOTES
I met patient in Infusion while she was waiting for her chemo to start. ValorieRN, was waiting on patient's labs to come back.  I introduced myself and went over resources in the Cancer Center.  Patient's symptoms of gas/bloating started in 5/2021. She lost 20 pounds in 3 weeks.  Patient also had an episode of CHF and then found out that she had metastatic cancer.  Patient looks good today and she says that she feels good.  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 He passed away 2 years ago.  I provided my contact card for navigational needs. AG

## 2021-10-04 NOTE — PROGRESS NOTES
CHEMOTHERAPY PREPARATION    Desiree Forte  [id number]  1940    Chief Complaint: Treatment preparation and needs assessment    History of present illness:  Desiree Forte is a 81 y.o. year old female who is here today for treatment preparation and needs assessment.  The patient has been diagnosed with metastatic hepatocellular carcinoma and is scheduled to begin treatment with Atezolizumab / Bevacizumab-awwb.     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     Past Medical History, Past Surgical History, Social History, Family History have been reviewed and are without significant changes except as mentioned.    Review of Systems:    Review of Systems   Constitutional: Positive for fatigue. Negative for appetite change, fever and unexpected weight change.   HENT: Negative for mouth sores, sore throat and trouble swallowing.    Respiratory: Negative for cough, shortness of breath and wheezing.    Cardiovascular: Negative for chest pain, palpitations and leg swelling.   Gastrointestinal: Negative for abdominal distention, abdominal pain, constipation, diarrhea, nausea and vomiting.   Genitourinary: Negative for difficulty urinating, dysuria and frequency.   Musculoskeletal: Negative for arthralgias.   Skin: Negative for pallor, rash and wound.   Neurological: Negative for dizziness and weakness.   Hematological: Does not bruise/bleed easily.   Psychiatric/Behavioral: Negative for confusion and sleep disturbance. The patient is not nervous/anxious.        Physical Exam:    Vitals:    10/04/21 1059   BP: 117/50   Pulse: 66   Resp: 16   Temp: 96 °F (35.6 °C)   SpO2: 98%     Vitals:     10/04/21 1059   PainSc: 0-No pain          ECOG: (1) Restricted in Physically Strenuous Activity, Ambulatory & Able to Do Work of Light Nature    General: well appearing, in no acute distress  Cardiovascular: regular rate and rhythm, + murmur  Lungs: clear to auscultation bilaterally, respirations even and unlabored  Extremities: no lower extremity edema  Skin: no rashes, lesions, bruising, or petechiae  Psych: Mood is stable            NEEDS ASSESSMENTS    Genetics  The patient's new diagnosis and family history have been reviewed for genetic counseling needs. A genetic referral is not recommended.     Psychosocial  The patient has completed a PHQ-9 Depression Screening and the Distress Thermometer (DT) today.   PHQ-9 results show 1-4 (Minimal Depression). The patient scored their distress today as 0 on a scale of 0-10 with 0 being no distress and 10 being extreme distress.   Problems marked by the patient as being an issue for them within the last week include physical problems.   Results were reviewed along with psychosocial resources offered by our cancer center.  Our oncology social worker will be flagged for a DT score of 4 or above, and a same day call will be made for a score of 9 or 10.  A mental health referral is offered at this time. The patient is not interested in a referral to [facility].   Copies of patient's questionnaires will be scanned into EMR for details and further reference.    Barriers to care  A barriers form was also completed by the patient today. We discussed services offered by our facility to help her have adequate access to care. The patient was given the name and contact information for our Oncology Social Worker, Xenia Jarquin.  Based upon barriers assessment today, the patient will not require a follow-up call from the  to further discuss needs.   A copy of the barriers form will also be scanned into EMR for details and further reference.     VAD  "Assessment  The patient and I discussed planned intervenous chemotherapy as well as other IV treatments that are often needed throughout the course of treatment. These may include, but are not limited to blood transfusions, antibiotics, and IV hydration. The vasculature does appear to be adequate for multiple peripheral IVs throughout their treatment course.     Advanced Care Planning  The patient and I discussed advanced care planning, \"Conversations that Matter\".   This service was offered, free of charge, for development of advance directives with a certified ACP facilitator.  The patient does not have an up-to-date advanced directive. The patient is not interested in an appointment with one of our facilitators to create or update their advanced directives.      Palliative Care  The patient and I discussed palliative care services. Palliative care is not the same as Hospice care. This is specialized medical care for people living with serious illness with the goal of improving quality of life for the patient and their family. [facility] has partnered with [facility] to offer our patients outpatient palliative care early along with their treatment to assist in coordination of care, symptom management, pain management, and medical decision making.  Oncology criteria for palliative care referral is not met at this time. The patient is not interested in a palliative care consultation.     Additional Referral needs  none      CHEMOTHERAPY EDUCATION    Chemotherapy education completed and consent obtained per oncology pharmacist.  See their documentation for further details.    Chemotherapy Regimen:   Treatment Plans     Name Type Plan Dates Plan Provider         Active    OP HEPATOBILIARY Atezolizumab / Bevacizumab-awwb  ONCOLOGY TREATMENT  9/27/2021 - Present Primitivo Pollock MD                    Chemotherapy education comprehension reviewed. Questions answered.      Assessment and Plan:    Diagnoses " and all orders for this visit:    1. 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 Needs assessment was completed including genetics, psychosocial needs, barriers to care, VAD evaluation, advanced care planning, and palliative care services. Referrals have been ordered as appropriate based upon our evaluation and patient desires.     Chemotherapy teaching was also completed today per pharmacy.  Adequate time was given to answer questions.  Patient and family are aware of their care team members and contact information if they have questions or problems throughout the treatment course. The patient is adequately prepared to begin treatment as scheduled today.     Reviewed with patient education regarding Zofran prescriptions sent to pharmacy.       Patient had labs drawn today.    I spent 30 minutes caring for Desiree on this date of service. This time includes time spent by me in the following activities: preparing for the visit, performing a medically appropriate examination and/or evaluation, counseling and educating the patient/family/caregiver, documenting information in the medical record and care coordination.     Latrice Olguin, APRN  10/04/21

## 2021-10-08 LAB
CYTO UR: NORMAL
LAB AP CARIS, ADDENDUM: NORMAL
LAB AP CASE REPORT: NORMAL
LAB AP CLINICAL INFORMATION: NORMAL
LAB AP DIAGNOSIS COMMENT: NORMAL
Lab: NORMAL
PATH REPORT.FINAL DX SPEC: NORMAL
PATH REPORT.GROSS SPEC: NORMAL

## 2021-10-14 ENCOUNTER — TELEPHONE (OUTPATIENT)
Dept: GASTROENTEROLOGY | Facility: CLINIC | Age: 81
End: 2021-10-14

## 2021-10-14 DIAGNOSIS — A04.72 COLITIS, CLOSTRIDIUM DIFFICILE: ICD-10-CM

## 2021-10-14 DIAGNOSIS — R19.7 DIARRHEA OF PRESUMED INFECTIOUS ORIGIN: Primary | ICD-10-CM

## 2021-10-14 NOTE — TELEPHONE ENCOUNTER
----- Message from [facility] Miguel sent at 10/14/2021  9:47 AM EDT -----  Can you please fax the C. difficile order over to [facility]?

## 2021-10-20 DIAGNOSIS — A04.72 COLITIS, CLOSTRIDIUM DIFFICILE: Primary | ICD-10-CM

## 2021-10-20 RX ORDER — VANCOMYCIN HYDROCHLORIDE 125 MG/1
CAPSULE ORAL
Qty: 84 CAPSULE | Refills: 0 | Status: SHIPPED | OUTPATIENT
Start: 2021-10-20 | End: 2022-05-12

## 2021-10-25 ENCOUNTER — HOSPITAL ENCOUNTER (OUTPATIENT)
Dept: ONCOLOGY | Facility: HOSPITAL | Age: 81
Setting detail: INFUSION SERIES
Discharge: HOME | End: 2021-10-25

## 2021-10-25 ENCOUNTER — OFFICE VISIT (OUTPATIENT)
Dept: ONCOLOGY | Facility: CLINIC | Age: 81
End: 2021-10-25

## 2021-10-25 ENCOUNTER — DOCUMENTATION (OUTPATIENT)
Dept: NUTRITION | Facility: HOSPITAL | Age: 81
End: 2021-10-25

## 2021-10-25 VITALS — SYSTOLIC BLOOD PRESSURE: 122 MMHG | DIASTOLIC BLOOD PRESSURE: 58 MMHG

## 2021-10-25 VITALS
TEMPERATURE: 97 F | OXYGEN SATURATION: 95 % | SYSTOLIC BLOOD PRESSURE: 130 MMHG | HEART RATE: 67 BPM | BODY MASS INDEX: 18.86 KG/M2 | RESPIRATION RATE: 16 BRPM | DIASTOLIC BLOOD PRESSURE: 75 MMHG | HEIGHT: 64 IN | WEIGHT: 110.5 LBS

## 2021-10-25 DIAGNOSIS — C22.0 HEPATOCELLULAR CARCINOMA METASTATIC TO PERITONEUM (HCC): Primary | ICD-10-CM

## 2021-10-25 DIAGNOSIS — C78.6 HEPATOCELLULAR CARCINOMA METASTATIC TO PERITONEUM (HCC): Primary | ICD-10-CM

## 2021-10-25 LAB
ALBUMIN SERPL-MCNC: 4.2 G/DL (ref 3.5–5.2)
ALBUMIN/GLOB SERPL: 1.4 G/DL
ALP SERPL-CCNC: 69 U/L (ref 39–117)
ALPHA-FETOPROTEIN: 1722 NG/ML (ref 0–8.3)
ALT SERPL W P-5'-P-CCNC: 19 U/L (ref 1–33)
ANION GAP SERPL CALCULATED.3IONS-SCNC: 10 MMOL/L (ref 5–15)
AST SERPL-CCNC: 32 U/L (ref 1–32)
BILIRUB SERPL-MCNC: 0.4 MG/DL (ref 0–1.2)
BILIRUB UR QL STRIP: NEGATIVE
BUN SERPL-MCNC: 23 MG/DL (ref 8–23)
BUN/CREAT SERPL: 21.7 (ref 7–25)
CALCIUM SPEC-SCNC: 10 MG/DL (ref 8.6–10.5)
CHLORIDE SERPL-SCNC: 102 MMOL/L (ref 98–107)
CLARITY UR: CLEAR
CO2 SERPL-SCNC: 30 MMOL/L (ref 22–29)
COLOR UR: YELLOW
CREAT SERPL-MCNC: 1.06 MG/DL (ref 0.57–1)
ERYTHROCYTE [DISTWIDTH] IN BLOOD BY AUTOMATED COUNT: 18.5 % (ref 12.3–15.4)
GFR SERPL CREATININE-BSD FRML MDRD: 50 ML/MIN/1.73
GLOBULIN UR ELPH-MCNC: 2.9 GM/DL
GLUCOSE BLDC GLUCOMTR-MCNC: 87 MG/DL (ref 70–130)
GLUCOSE SERPL-MCNC: 85 MG/DL (ref 65–99)
GLUCOSE UR STRIP-MCNC: NEGATIVE MG/DL
HCT VFR BLD AUTO: 35.2 % (ref 34–46.6)
HGB BLD-MCNC: 11.2 G/DL (ref 12–15.9)
HGB UR QL STRIP.AUTO: NEGATIVE
KETONES UR QL STRIP: NEGATIVE
LEUKOCYTE ESTERASE UR QL STRIP.AUTO: NEGATIVE
LYMPHOCYTES # BLD AUTO: 1 10*3/MM3 (ref 0.7–3.1)
LYMPHOCYTES NFR BLD AUTO: 15.5 % (ref 19.6–45.3)
MCH RBC QN AUTO: 27.1 PG (ref 26.6–33)
MCHC RBC AUTO-ENTMCNC: 32 G/DL (ref 31.5–35.7)
MCV RBC AUTO: 84.7 FL (ref 79–97)
MONOCYTES # BLD AUTO: 0.3 10*3/MM3 (ref 0.1–0.9)
MONOCYTES NFR BLD AUTO: 4.5 % (ref 5–12)
NEUTROPHILS NFR BLD AUTO: 5.1 10*3/MM3 (ref 1.7–7)
NEUTROPHILS NFR BLD AUTO: 80 % (ref 42.7–76)
NITRITE UR QL STRIP: NEGATIVE
NT-PROBNP SERPL-MCNC: ABNORMAL PG/ML (ref 0–1800)
PH UR STRIP.AUTO: 6 [PH] (ref 5–8)
PLATELET # BLD AUTO: 368 10*3/MM3 (ref 140–450)
PMV BLD AUTO: 8.7 FL (ref 6–12)
POTASSIUM SERPL-SCNC: 3.8 MMOL/L (ref 3.5–5.2)
PROT SERPL-MCNC: 7.1 G/DL (ref 6–8.5)
PROT UR QL STRIP: NEGATIVE
RBC # BLD AUTO: 4.15 10*6/MM3 (ref 3.77–5.28)
SODIUM SERPL-SCNC: 142 MMOL/L (ref 136–145)
SP GR UR STRIP: 1.01 (ref 1–1.03)
UROBILINOGEN UR QL STRIP: NORMAL
WBC # BLD AUTO: 6.4 10*3/MM3 (ref 3.4–10.8)

## 2021-10-25 PROCEDURE — 80053 COMPREHEN METABOLIC PANEL: CPT | Performed by: INTERNAL MEDICINE

## 2021-10-25 PROCEDURE — 96413 CHEMO IV INFUSION 1 HR: CPT

## 2021-10-25 PROCEDURE — 96417 CHEMO IV INFUS EACH ADDL SEQ: CPT

## 2021-10-25 PROCEDURE — 25010000002 ATEZOLIZUMAB 1200 MG/20ML SOLUTION 20 ML VIAL: Performed by: INTERNAL MEDICINE

## 2021-10-25 PROCEDURE — 25010000002 BEVACIZUMAB-AWWB 400 MG/16ML SOLUTION 16 ML VIAL: Performed by: INTERNAL MEDICINE

## 2021-10-25 PROCEDURE — 82105 ALPHA-FETOPROTEIN SERUM: CPT | Performed by: INTERNAL MEDICINE

## 2021-10-25 PROCEDURE — 99215 OFFICE O/P EST HI 40 MIN: CPT | Performed by: INTERNAL MEDICINE

## 2021-10-25 PROCEDURE — 81003 URINALYSIS AUTO W/O SCOPE: CPT | Performed by: INTERNAL MEDICINE

## 2021-10-25 PROCEDURE — 82962 GLUCOSE BLOOD TEST: CPT

## 2021-10-25 PROCEDURE — 83880 ASSAY OF NATRIURETIC PEPTIDE: CPT | Performed by: INTERNAL MEDICINE

## 2021-10-25 PROCEDURE — 85025 COMPLETE CBC W/AUTO DIFF WBC: CPT | Performed by: INTERNAL MEDICINE

## 2021-10-25 RX ORDER — SODIUM CHLORIDE 9 MG/ML
250 INJECTION, SOLUTION INTRAVENOUS ONCE
Status: CANCELLED | OUTPATIENT
Start: 2021-10-25

## 2021-10-25 RX ORDER — SODIUM CHLORIDE 9 MG/ML
250 INJECTION, SOLUTION INTRAVENOUS ONCE
Status: COMPLETED | OUTPATIENT
Start: 2021-10-25 | End: 2021-10-25

## 2021-10-25 RX ADMIN — ATEZOLIZUMAB 1200 MG: 1200 INJECTION, SOLUTION INTRAVENOUS at 11:38

## 2021-10-25 RX ADMIN — BEVACIZUMAB-AWWB 780 MG: 400 INJECTION, SOLUTION INTRAVENOUS at 12:23

## 2021-10-25 RX ADMIN — SODIUM CHLORIDE 250 ML: 9 INJECTION, SOLUTION INTRAVENOUS at 11:37

## 2021-10-25 NOTE — PROGRESS NOTES
Onc Nutrition    Patient: Desiree Forte  YOB: 1940    Diagnosis: metastatic hepatocellular carcinoma  Chemotherapy: Tecentriq / Mvasi - every 21 days     Weight - 110# / 2# weight loss x 3 weeks  Weight Change - ~18# (13.9%) weight loss x 3 weeks (in July 2021) per patient and weight has been stable x ~2 months (prior to starting treatment)    Follow up with patient during her infusion appointment.  Patient reports having episodes of diarrhea and that her c.diff has returned but since starting Vancomycin her bowel movements are much better.  She denies significant nutritional complaints at this time.  She states her appetite has been good and she is eating and drinking well.  She continues to drink 1-2 Boost per day.    Encouraged her to continue with her good oral intake and advised her to be consuming smaller portions more often throughout the day.  Also encouraged her to drink hydrating fluids throughout the day.  Advised her to continue drinking Boost and / or Ensure as needed to aid with calorie and protein intake.      She denies nutritional questions and the need for Boost / Ensure coupons at this time.  Encouraged her to call RD if questions arise.  She voiced understanding of information discussed.  Will continue to monitor as needed during her treatment course.     Abeba Silva RD  10/25/21

## 2021-10-25 NOTE — PROGRESS NOTES
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  female presents today for follow-up of her hepatocellular carcinoma.  She has completed 1 cycle of Tecentriq and Avastin.  Seem to tolerated reasonably well.  No significant change in her overall symptoms.  She did have few days of feeling well.  Continues to have some shortness of breath.  Concerned that she has underlying heart issues also.  Denies any issues with nausea vomiting.  No constipation issues.  Patient does not have any pain.  She is considering going to [place] for the winter.    Past medical history, social history and family history was reviewed 10/25/21 and unchanged from prior visit.    Review of Systems:    Review of Systems - Oncology         Medications:        Current Outpatient Medications:   •  carvedilol (COREG) 6.25 MG tablet, Take 1 tablet by mouth 2 (Two) Times a Day., Disp: 180 tablet, Rfl: 3  •  fenofibrate (TRICOR) 145 MG tablet, Take 1 tablet by mouth Daily., Disp: 90 tablet, Rfl: 3  •  losartan (COZAAR) 25 MG tablet, Take 1 tablet by mouth 2 (Two) Times a Day., Disp: 180 tablet, Rfl: 3  •  ondansetron (ZOFRAN) 8 MG tablet, Take 1 tablet by mouth 3 (Three) Times a Day As Needed for Nausea or Vomiting., Disp: 30 tablet, Rfl: 5  •  torsemide (DEMADEX) 10 MG tablet, Take 1 tablet by mouth Daily., Disp: 90 tablet, Rfl: 3  •  vancomycin (VANCOCIN) 125 MG capsule, Take 1 cap by mouth 4 times a day for 14 days, then 1 cap twice daily for 7 days, then 1 cap daily for 7 days, then 1 cap every 2 days for 2 weeks., Disp: 84 capsule, Rfl: 0           ALLERGIES:    Allergies   Allergen Reactions   • Metformin Diarrhea   •  "Statins Myalgia     Muscle pain         Physical Exam    VITAL SIGNS:  /75   Pulse 67   Temp 97 °F (36.1 °C) (Temporal)   Resp 16   Ht 162.6 cm (64\")   Wt 50.1 kg (110 lb 8 oz)   SpO2 95%   BMI 18.97 kg/m²     ECOG score: 1           Wt Readings from Last 3 Encounters:   10/25/21 50.1 kg (110 lb 8 oz)   10/04/21 51.1 kg (112 lb 9.6 oz)   09/27/21 52.1 kg (114 lb 12.8 oz)       Body mass index is 18.97 kg/m². Body surface area is 1.52 meters squared.    Pain Score    10/25/21 0942   PainSc: 0-No pain           Performance Status: 1    General: well appearing, in no acute distress  HEENT: sclera anicteric, neck is supple  Lymphatics: no cervical, supraclavicular, or axillary adenopathy  Cardiovascular: regular rate and rhythm, no murmurs, rubs or gallops  Lungs: clear to auscultation bilaterally  Abdomen: soft, nontender, nondistended.  No palpable organomegaly  Extremities: no lower extremity edema  Skin: no rashes, lesions, bruising, or petechiae  Msk:  Shows no weakness of the large muscle groups  Psych: Mood is stable        RECENT LABS:    Lab Results   Component Value Date    HGB 10.1 (L) 10/04/2021    HCT 32.1 (L) 10/04/2021    MCV 84.9 10/04/2021     10/04/2021    WBC 5.30 10/04/2021    NEUTROABS 4.00 10/04/2021    LYMPHSABS 1.10 10/04/2021    MONOSABS 0.20 10/04/2021    EOSABS 0.17 09/03/2021    BASOSABS 0.06 09/03/2021       Lab Results   Component Value Date    GLUCOSE 120 (H) 10/04/2021    BUN 25 (H) 10/04/2021    CREATININE 1.00 10/04/2021     10/04/2021    K 3.8 10/04/2021     10/04/2021    CO2 28.0 10/04/2021    CALCIUM 9.8 10/04/2021    PROTEINTOT 7.0 10/04/2021    ALBUMIN 4.10 10/04/2021    BILITOT 0.5 10/04/2021    ALKPHOS 57 10/04/2021    AST 36 (H) 10/04/2021    ALT 17 10/04/2021     Lab Results   Component Value Date    AFP 3,324 (H) 10/04/2021     Lab Results   Component Value Date    PROBNP 13,256.0 (H) 10/25/2021         CT Chest With Contrast " Diagnostic    Result Date: 9/24/2021  1. Areas of opacification midlungs bilateral right greater than left most consistent with postinfectious or postinflammatory etiology although intermixed micronodular appearance not entirely excluding pulmonary involvement and metastasis with potential follow-up. No dominant nodule or mass however  2. Omental caking and mesenteric nodularity/reticulation consistent with peritoneal disease process potentially metastatic with no definitive evidence for cirrhotic hepatic morphology evident only a flash fill benign hemangioma the right hepatic lobe without separate area of hepatic cell carcinoma or washout characteristics is identified. Perihepatic ascites. Partially visualized GI tract unremarkable  DICTATED:   09/24/2021 EDITED/ls :   09/24/2021  This report was finalized on 9/24/2021 5:45 PM by Dr. Chava Hall.      CT Abdomen With & Without Contrast    Result Date: 9/24/2021  1. Areas of opacification midlungs bilateral right greater than left most consistent with postinfectious or postinflammatory etiology although intermixed micronodular appearance not entirely excluding pulmonary involvement and metastasis with potential follow-up. No dominant nodule or mass however  2. Omental caking and mesenteric nodularity/reticulation consistent with peritoneal disease process potentially metastatic with no definitive evidence for cirrhotic hepatic morphology evident only a flash fill benign hemangioma the right hepatic lobe without separate area of hepatic cell carcinoma or washout characteristics is identified. Perihepatic ascites. Partially visualized GI tract unremarkable  DICTATED:   09/24/2021 EDITED/ls :   09/24/2021  This report was finalized on 9/24/2021 5:45 PM by Dr. Chava Hall.      Duplex Renal Artery - Bilateral Complete CAR    Result Date: 9/20/2021  Parenchymal blood flow pattern seen within both kidneys. No evidence of renal artery stenosis.  D:  09/17/2021 E:   09/17/2021  This report was finalized on 9/20/2021 9:32 AM by Dr. Abeba Goyal MD.        Clinical Information    Omental mass.   Final Diagnosis   1.  OMENTAL BIOPSY:  Involved by malignant neoplasm with evidence of hepatocellular differentiation (see comment).    2.  PERITONEAL NODULE:  Involved by malignant neoplasm with evidence of hepatocellular differentiation (see comment).   Electronically signed by Sean Street MD on 9/20/2021 at 1050   Comment    Both the omentum and the peritoneal nodule show a similar appearance with a neoplasm consisting of polygonal cells with slightly granular clear cytoplasm and mild nuclear pleomorphism.  No definitive gland formation is noted.  No evidence of squamous differentiation is identified.  Numerous immunohistochemical stains were undertaken.  Tumor is negative for CD45, CD30, CK 5/6, CD68, CD45, PLAP desmin, PAX 8, vimentin, TTF-1, S100, WT1, p16, melanin, GATA3, CK7, CK20, , CD10, CAIX, and calretinin.  There is positivity for CK 8 in approximately 66% of the neoplastic cells.  Cytokeratin SAIRA also shows positivity in a significant percentage of the neoplastic cells.  P53 shows nuclear positivity which is weak and involves approximately 25% of cells.  There is strong diffuse positivity for Heppar-1 and arginase.  This staining pattern supports evidence of hepatic differentiation in this neoplasm.  Hepatocellular carcinoma would be the most likely diagnosis.  The possibility of a hepatoid carcinoma from another site cannot be entirely excluded although this is considered to be less likely.  Adequate tissue is present for molecular diagnostic studies if required.    This case was discussed with Dr. Jaimes by Dr. Street on 9/20/2021.           Assessment/Plan    1.  Metastatic hepatocellular carcinoma.  This is a very unusual disease presentation for her.  She has significant carcinomatosis in the abdomen but no obvious discrete mass in the liver.  With an  AFP that is elevated this is likely the diagnosis.  I have started her on Tecentriq and Avastin and she is completed 1 cycle with no significant changes in her overall disposition.  I plan to repeat my scans after 3 cycles just to see how she is doing.  She is planning to move to [place] for the winter.  If she does she will have to contact oncologist there to start her treatments.  I sent her tissue for NexGen sequencing which did not show a targetable mutation.  We also got tumor ID performed by Haritha which did not show the primary site of disease.  So we are dependent on her AFP and IHC markers to determine that this is the most likely diagnosis.    2.  Congestive heart failure symptoms.  Patient is on torsemide to help with it.  She was scheduled to undergo heart cath prior to this occurring.  I would recommend not performing any heart caths for at least 1 month after Avastin due to concerns of wound healing if surgery is necessary.  She does have a incurable disease that I am not clear how she is going to do going forward and so if we can put off intervention that would be reasonable.        Total time of patient care on day of service including time prior to, face to face with patient, and following visit spent in reviewing records, lab results, imaging studies, discussion with patient, and documentation/charting was > 45 minutes.     Primitivo Pollock MD  [facility] Hematology and Oncology    Return on: 11/15/21  Return in (Approximately): Schedule with next infusion, 3 weeks    Orders Placed This Encounter   Procedures   • AFP Tumor Marker   • Comprehensive metabolic panel   • Urinalysis without microscopic (no culture) - Urine, Clean Catch   • B-type natriuretic peptide   • CBC and Differential       10/25/2021

## 2021-10-29 ENCOUNTER — TELEPHONE (OUTPATIENT)
Dept: CARDIOLOGY | Facility: CLINIC | Age: 81
End: 2021-10-29

## 2021-10-29 NOTE — TELEPHONE ENCOUNTER
----- Message from Desiree Forte sent at 10/28/2021  8:35 PM EDT -----  Regarding: Heart cath  Dr Saucedo, Dr Pickens advised on my last visit with him that if a heart cath is still warranted, that I would need to be off of one of the two infusion medications I am currently taking for one month. I would like your opinion on this. My 02 level sometimes drops to 87, and I have the gurgling noise when lying down. If the cath would lead to improved quality of life, I would be interested in discussing. My fatigue is very bad, and I believe it is as much my heart as the cancer. I did notice in my most recent bloodwork that the B-type  natiuretec peptide result was [id number]. I mention because it says that test has to do with congestive heart failure and should be 300 or less. Please let me know your thoughts. Dr Pickens said he would send you  his latest notes.

## 2021-10-29 NOTE — TELEPHONE ENCOUNTER
Noted; would defer LHC at this time.  If she is not on a diuretic would initiate torsemide 20 mg daily.  If she is on torsemide would double the dose and have her update us in the next 3-4 days.    Thanks!

## 2021-11-01 ENCOUNTER — TELEPHONE (OUTPATIENT)
Dept: CARDIOLOGY | Facility: CLINIC | Age: 81
End: 2021-11-01

## 2021-11-01 RX ORDER — TORSEMIDE 20 MG/1
20 TABLET ORAL DAILY
Qty: 90 TABLET | Refills: 3 | Status: SHIPPED | OUTPATIENT
Start: 2021-11-01 | End: 2022-10-31

## 2021-11-01 NOTE — TELEPHONE ENCOUNTER
"----- Message -----        From:Desiree Forte        Sent:11/1/2021 11:08 AM EDT          To:Carlyle Saucedo MD     Subject:Torsemide     Good morning Carmen Us! From [facility]!  I have increased my Torsemide to 20 mg instead of 20 once per day as you suggested.  My breathing has certainly improved as well as my energy.   I will be out of my medicine before returning to [place]. Please fax a prescription to [facility] at [address]. Their Fax# is [phone].  Thank you so very much!! Desiree Forte [birth year].       ----- Message -----        From:AILYN TAVERAS        Sent:11/1/2021 11:16 AM EDT          To:Desiree Forte     Subject:Torsemide     What has your blood pressure and heart rate been running? How have your oxygen saturations been since increasing torsemide? Are you still experiencing \"gurgling\" when you lay down?     Thanks,   Carmen Us RN         ----- Message from Desiree Forte sent at 11/1/2021 11:23 AM EDT -----  Regarding: Torsemide  My blood pressure and pulse are normal. No more gurgling and energy is way up today      _____________________________        Patient was taking torsemide 10 mg daily, increased to torsemide 20 mg daily on 10/29/21 due to:     \"My 02 level sometimes drops to 87, and I have the gurgling noise when lying down. If the cath would lead to improved quality of life, I would be interested in discussing. My fatigue is very bad, and I believe it is as much my heart as the cancer. I did notice in my most recent bloodwork that the B-type  natiuretec peptide result was [id number]. I mention because it says that test has to do with congestive heart failure and should be 300 or less.\" from patient message.      Would you like patient to continue torsemide 20 mg daily?    Please advise.  "

## 2021-11-02 ENCOUNTER — TELEPHONE (OUTPATIENT)
Dept: GASTROENTEROLOGY | Facility: CLINIC | Age: 81
End: 2021-11-02

## 2021-11-02 DIAGNOSIS — A04.72 COLITIS, CLOSTRIDIUM DIFFICILE: Primary | ICD-10-CM

## 2021-11-02 NOTE — TELEPHONE ENCOUNTER
----- Message from Quentin Pimentel MA sent at 11/2/2021  1:00 PM EDT -----  Regarding: C-diff  .    ----- Message -----  From: Desiree Forte  Sent: 11/2/2021  12:41 PM EDT  To: Mge [facility]astro [facility]  Subject: C-diff                                           She is an infectious disease doctor

## 2021-11-15 ENCOUNTER — HOSPITAL ENCOUNTER (OUTPATIENT)
Dept: ONCOLOGY | Facility: HOSPITAL | Age: 81
Setting detail: INFUSION SERIES
Discharge: HOME | End: 2021-11-15

## 2021-11-15 ENCOUNTER — OFFICE VISIT (OUTPATIENT)
Dept: ONCOLOGY | Facility: CLINIC | Age: 81
End: 2021-11-15

## 2021-11-15 VITALS
SYSTOLIC BLOOD PRESSURE: 120 MMHG | TEMPERATURE: 97.7 F | HEIGHT: 64 IN | HEART RATE: 67 BPM | OXYGEN SATURATION: 97 % | WEIGHT: 107 LBS | RESPIRATION RATE: 16 BRPM | DIASTOLIC BLOOD PRESSURE: 68 MMHG | BODY MASS INDEX: 18.27 KG/M2

## 2021-11-15 DIAGNOSIS — C22.0 HEPATOCELLULAR CARCINOMA METASTATIC TO PERITONEUM (HCC): Primary | ICD-10-CM

## 2021-11-15 DIAGNOSIS — C78.6 HEPATOCELLULAR CARCINOMA METASTATIC TO PERITONEUM (HCC): Primary | ICD-10-CM

## 2021-11-15 LAB
ALBUMIN SERPL-MCNC: 4.3 G/DL (ref 3.5–5.2)
ALBUMIN/GLOB SERPL: 1.4 G/DL
ALP SERPL-CCNC: 64 U/L (ref 39–117)
ALPHA-FETOPROTEIN: 1090 NG/ML (ref 0–8.3)
ALT SERPL W P-5'-P-CCNC: 16 U/L (ref 1–33)
ANION GAP SERPL CALCULATED.3IONS-SCNC: 11 MMOL/L (ref 5–15)
AST SERPL-CCNC: 29 U/L (ref 1–32)
BILIRUB SERPL-MCNC: 0.4 MG/DL (ref 0–1.2)
BILIRUB UR QL STRIP: NEGATIVE
BUN SERPL-MCNC: 36 MG/DL (ref 8–23)
BUN/CREAT SERPL: 30 (ref 7–25)
CALCIUM SPEC-SCNC: 10 MG/DL (ref 8.6–10.5)
CHLORIDE SERPL-SCNC: 103 MMOL/L (ref 98–107)
CLARITY UR: CLEAR
CO2 SERPL-SCNC: 30 MMOL/L (ref 22–29)
COLOR UR: YELLOW
CREAT SERPL-MCNC: 1.2 MG/DL (ref 0.57–1)
ERYTHROCYTE [DISTWIDTH] IN BLOOD BY AUTOMATED COUNT: 17 % (ref 12.3–15.4)
GFR SERPL CREATININE-BSD FRML MDRD: 43 ML/MIN/1.73
GLOBULIN UR ELPH-MCNC: 3.1 GM/DL
GLUCOSE BLDC GLUCOMTR-MCNC: 77 MG/DL (ref 70–130)
GLUCOSE SERPL-MCNC: 85 MG/DL (ref 65–99)
GLUCOSE UR STRIP-MCNC: NEGATIVE MG/DL
HCT VFR BLD AUTO: 37.7 % (ref 34–46.6)
HGB BLD-MCNC: 12 G/DL (ref 12–15.9)
HGB UR QL STRIP.AUTO: NEGATIVE
KETONES UR QL STRIP: NEGATIVE
LEUKOCYTE ESTERASE UR QL STRIP.AUTO: NEGATIVE
LYMPHOCYTES # BLD AUTO: 1.1 10*3/MM3 (ref 0.7–3.1)
LYMPHOCYTES NFR BLD AUTO: 19.3 % (ref 19.6–45.3)
MCH RBC QN AUTO: 26.8 PG (ref 26.6–33)
MCHC RBC AUTO-ENTMCNC: 31.8 G/DL (ref 31.5–35.7)
MCV RBC AUTO: 84.4 FL (ref 79–97)
MONOCYTES # BLD AUTO: 0.2 10*3/MM3 (ref 0.1–0.9)
MONOCYTES NFR BLD AUTO: 3.9 % (ref 5–12)
NEUTROPHILS NFR BLD AUTO: 4.3 10*3/MM3 (ref 1.7–7)
NEUTROPHILS NFR BLD AUTO: 76.8 % (ref 42.7–76)
NITRITE UR QL STRIP: NEGATIVE
PH UR STRIP.AUTO: 6 [PH] (ref 5–8)
PLATELET # BLD AUTO: 315 10*3/MM3 (ref 140–450)
PMV BLD AUTO: 9 FL (ref 6–12)
POTASSIUM SERPL-SCNC: 3.8 MMOL/L (ref 3.5–5.2)
PROT SERPL-MCNC: 7.4 G/DL (ref 6–8.5)
PROT UR QL STRIP: NEGATIVE
RBC # BLD AUTO: 4.47 10*6/MM3 (ref 3.77–5.28)
SODIUM SERPL-SCNC: 144 MMOL/L (ref 136–145)
SP GR UR STRIP: 1.02 (ref 1–1.03)
T4 FREE SERPL-MCNC: 1.59 NG/DL (ref 0.93–1.7)
TSH SERPL DL<=0.05 MIU/L-ACNC: 6.42 UIU/ML (ref 0.27–4.2)
UROBILINOGEN UR QL STRIP: NORMAL
WBC # BLD AUTO: 5.6 10*3/MM3 (ref 3.4–10.8)

## 2021-11-15 PROCEDURE — 84439 ASSAY OF FREE THYROXINE: CPT | Performed by: INTERNAL MEDICINE

## 2021-11-15 PROCEDURE — 82962 GLUCOSE BLOOD TEST: CPT

## 2021-11-15 PROCEDURE — 96417 CHEMO IV INFUS EACH ADDL SEQ: CPT

## 2021-11-15 PROCEDURE — 84443 ASSAY THYROID STIM HORMONE: CPT | Performed by: INTERNAL MEDICINE

## 2021-11-15 PROCEDURE — 96413 CHEMO IV INFUSION 1 HR: CPT

## 2021-11-15 PROCEDURE — 99214 OFFICE O/P EST MOD 30 MIN: CPT | Performed by: INTERNAL MEDICINE

## 2021-11-15 PROCEDURE — 25010000002 BEVACIZUMAB-AWWB 400 MG/16ML SOLUTION 16 ML VIAL: Performed by: INTERNAL MEDICINE

## 2021-11-15 PROCEDURE — 82105 ALPHA-FETOPROTEIN SERUM: CPT | Performed by: INTERNAL MEDICINE

## 2021-11-15 PROCEDURE — 80053 COMPREHEN METABOLIC PANEL: CPT | Performed by: INTERNAL MEDICINE

## 2021-11-15 PROCEDURE — 85025 COMPLETE CBC W/AUTO DIFF WBC: CPT | Performed by: INTERNAL MEDICINE

## 2021-11-15 PROCEDURE — 25010000002 ATEZOLIZUMAB 1200 MG/20ML SOLUTION 20 ML VIAL: Performed by: INTERNAL MEDICINE

## 2021-11-15 PROCEDURE — 81003 URINALYSIS AUTO W/O SCOPE: CPT | Performed by: INTERNAL MEDICINE

## 2021-11-15 RX ORDER — SODIUM CHLORIDE 9 MG/ML
250 INJECTION, SOLUTION INTRAVENOUS ONCE
Status: CANCELLED | OUTPATIENT
Start: 2021-11-15

## 2021-11-15 RX ORDER — SODIUM CHLORIDE 9 MG/ML
250 INJECTION, SOLUTION INTRAVENOUS ONCE
Status: COMPLETED | OUTPATIENT
Start: 2021-11-15 | End: 2021-11-15

## 2021-11-15 RX ADMIN — ATEZOLIZUMAB 1200 MG: 1200 INJECTION, SOLUTION INTRAVENOUS at 11:35

## 2021-11-15 RX ADMIN — BEVACIZUMAB-AWWB 780 MG: 400 INJECTION, SOLUTION INTRAVENOUS at 12:13

## 2021-11-15 RX ADMIN — SODIUM CHLORIDE 250 ML: 9 INJECTION, SOLUTION INTRAVENOUS at 11:34

## 2021-11-15 NOTE — PROGRESS NOTES
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  female presents today for follow-up of her hepatocellular carcinoma.  She has completed 2 cycles of Tecentriq and Avastin.  Seem to tolerated reasonably well.  Her symptoms have improved considerably.  Dr. Saucedo her cardiologist had started her on torsemide.  This has improved her breathing and her weight has actually gotten down.  I suspect some of it was volume overload from heart issues.  She denies any issues with diarrhea.  She has a history of C. difficile.     Past medical history, social history and family history was reviewed 11/15/21 and unchanged from prior visit.    Review of Systems:    Review of Systems - Oncology         Medications:        Current Outpatient Medications:   •  carvedilol (COREG) 6.25 MG tablet, Take 1 tablet by mouth 2 (Two) Times a Day., Disp: 180 tablet, Rfl: 3  •  fenofibrate (TRICOR) 145 MG tablet, Take 1 tablet by mouth Daily., Disp: 90 tablet, Rfl: 3  •  losartan (COZAAR) 25 MG tablet, Take 1 tablet by mouth 2 (Two) Times a Day., Disp: 180 tablet, Rfl: 3  •  ondansetron (ZOFRAN) 8 MG tablet, Take 1 tablet by mouth 3 (Three) Times a Day As Needed for Nausea or Vomiting., Disp: 30 tablet, Rfl: 5  •  torsemide (DEMADEX) 20 MG tablet, Take 1 tablet by mouth Daily., Disp: 90 tablet, Rfl: 3  •  vancomycin (VANCOCIN) 125 MG capsule, Take 1 cap by mouth 4 times a day for 14 days, then 1 cap twice daily for 7 days, then 1 cap daily for 7 days, then 1 cap every 2 days for 2 weeks., Disp: 84 capsule, Rfl: 0  No current facility-administered medications for this visit.           ALLERGIES:    Allergies   Allergen  "Reactions   • Metformin Diarrhea   • Statins Myalgia     Muscle pain         Physical Exam    VITAL SIGNS:  /68   Pulse 67   Temp 97.7 °F (36.5 °C) (Temporal)   Resp 16   Ht 162.6 cm (64\")   Wt 48.5 kg (107 lb)   SpO2 97%   BMI 18.37 kg/m²     ECOG score: 1           Wt Readings from Last 3 Encounters:   11/15/21 48.5 kg (107 lb)   10/25/21 50.1 kg (110 lb 8 oz)   10/04/21 51.1 kg (112 lb 9.6 oz)       Body mass index is 18.37 kg/m². Body surface area is 1.5 meters squared.    Pain Score    11/15/21 1001   PainSc: 0-No pain           Performance Status: 1    General: well appearing, in no acute distress  HEENT: sclera anicteric, neck is supple  Lymphatics: no cervical, supraclavicular, or axillary adenopathy  Extremities: no lower extremity edema  Skin: no rashes, lesions, bruising, or petechiae  Msk:  Shows no weakness of the large muscle groups  Psych: Mood is stable        RECENT LABS:    Lab Results   Component Value Date    HGB 12.0 11/15/2021    HCT 37.7 11/15/2021    MCV 84.4 11/15/2021     11/15/2021    WBC 5.60 11/15/2021    NEUTROABS 4.30 11/15/2021    LYMPHSABS 1.10 11/15/2021    MONOSABS 0.20 11/15/2021    EOSABS 0.17 09/03/2021    BASOSABS 0.06 09/03/2021       Lab Results   Component Value Date    GLUCOSE 85 11/15/2021    BUN 36 (H) 11/15/2021    CREATININE 1.20 (H) 11/15/2021     11/15/2021    K 3.8 11/15/2021     11/15/2021    CO2 30.0 (H) 11/15/2021    CALCIUM 10.0 11/15/2021    PROTEINTOT 7.4 11/15/2021    ALBUMIN 4.30 11/15/2021    BILITOT 0.4 11/15/2021    ALKPHOS 64 11/15/2021    AST 29 11/15/2021    ALT 16 11/15/2021     Lab Results   Component Value Date    AFP 1,090 (H) 11/15/2021    AFP 1,722 (H) 10/25/2021    AFP 3,324 (H) 10/04/2021     Lab Results   Component Value Date    PROBNP 13,256.0 (H) 10/25/2021         CT Chest With Contrast Diagnostic    Result Date: 9/24/2021  1. Areas of opacification midlungs bilateral right greater than left most consistent " with postinfectious or postinflammatory etiology although intermixed micronodular appearance not entirely excluding pulmonary involvement and metastasis with potential follow-up. No dominant nodule or mass however  2. Omental caking and mesenteric nodularity/reticulation consistent with peritoneal disease process potentially metastatic with no definitive evidence for cirrhotic hepatic morphology evident only a flash fill benign hemangioma the right hepatic lobe without separate area of hepatic cell carcinoma or washout characteristics is identified. Perihepatic ascites. Partially visualized GI tract unremarkable  DICTATED:   09/24/2021 EDITED/ls :   09/24/2021  This report was finalized on 9/24/2021 5:45 PM by Dr. Chava Hall.      CT Abdomen With & Without Contrast    Result Date: 9/24/2021  1. Areas of opacification midlungs bilateral right greater than left most consistent with postinfectious or postinflammatory etiology although intermixed micronodular appearance not entirely excluding pulmonary involvement and metastasis with potential follow-up. No dominant nodule or mass however  2. Omental caking and mesenteric nodularity/reticulation consistent with peritoneal disease process potentially metastatic with no definitive evidence for cirrhotic hepatic morphology evident only a flash fill benign hemangioma the right hepatic lobe without separate area of hepatic cell carcinoma or washout characteristics is identified. Perihepatic ascites. Partially visualized GI tract unremarkable  DICTATED:   09/24/2021 EDITED/ls :   09/24/2021  This report was finalized on 9/24/2021 5:45 PM by Dr. Chava Hall.      Duplex Renal Artery - Bilateral Complete CAR    Result Date: 9/20/2021  Parenchymal blood flow pattern seen within both kidneys. No evidence of renal artery stenosis.  D:  09/17/2021 E:  09/17/2021  This report was finalized on 9/20/2021 9:32 AM by Dr. Abeba Goyal MD.        Clinical Information    Omental  mass.   Final Diagnosis   1.  OMENTAL BIOPSY:  Involved by malignant neoplasm with evidence of hepatocellular differentiation (see comment).    2.  PERITONEAL NODULE:  Involved by malignant neoplasm with evidence of hepatocellular differentiation (see comment).   Electronically signed by Sean Street MD on 9/20/2021 at 1050   Comment    Both the omentum and the peritoneal nodule show a similar appearance with a neoplasm consisting of polygonal cells with slightly granular clear cytoplasm and mild nuclear pleomorphism.  No definitive gland formation is noted.  No evidence of squamous differentiation is identified.  Numerous immunohistochemical stains were undertaken.  Tumor is negative for CD45, CD30, CK 5/6, CD68, CD45, PLAP desmin, PAX 8, vimentin, TTF-1, S100, WT1, p16, melanin, GATA3, CK7, CK20, , CD10, CAIX, and calretinin.  There is positivity for CK 8 in approximately 66% of the neoplastic cells.  Cytokeratin SAIRA also shows positivity in a significant percentage of the neoplastic cells.  P53 shows nuclear positivity which is weak and involves approximately 25% of cells.  There is strong diffuse positivity for Heppar-1 and arginase.  This staining pattern supports evidence of hepatic differentiation in this neoplasm.  Hepatocellular carcinoma would be the most likely diagnosis.  The possibility of a hepatoid carcinoma from another site cannot be entirely excluded although this is considered to be less likely.  Adequate tissue is present for molecular diagnostic studies if required.    This case was discussed with Dr. Jaimes by Dr. Street on 9/20/2021.           Assessment/Plan    1.  Metastatic hepatocellular carcinoma.  This is a very unusual disease presentation for her.  She has significant carcinomatosis in the abdomen but no obvious discrete mass in the liver.  With an AFP that is elevated this is likely the diagnosis.  I have started her on Tecentriq and Avastin  IShe is planning to move to  [place] for the winter.  If she does she will have to contact oncologist there to start her treatments.  I sent her tissue for NexGen sequencing which did not show a targetable mutation.  We also got tumor ID performed by Haritha which did not show the primary site of disease.  So we are dependent on her AFP and IHC markers to determine that this is the most likely diagnosis.    She has completed 2 cycles and she will get her third cycle today.  I am going to repeat CAT scans on her this week so that they will have something to compare to when she goes to [place].  She is arranged for [place]4 [place] [facility] [place]  I have asked her to get her films placed on disc so that she can take it down there for comparison.    2.  Congestive heart failure symptoms.  Patient is on torsemide to help with it.  She has improved since Dr. Saucedo had intervened on her care and she is doing much better from a heart failure standpoint.      She will call me when she is planning to return back to [place].  She wants to stay in [place] until April.    Total time of patient care on day of service including time prior to, face to face with patient, and following visit spent in reviewing records, lab results, imaging studies, discussion with patient, and documentation/charting was > 25 minutes.     Primitivo Pollock MD  [facility] Hematology and Oncology         Orders Placed This Encounter   Procedures   • CT Abdomen Pelvis With Contrast   • CT Chest With Contrast Diagnostic   • AFP Tumor Marker   • Comprehensive metabolic panel   • TSH   • T4, free   • Urinalysis without microscopic (no culture) - Urine, Clean Catch   • CBC and Differential       11/15/2021

## 2021-11-19 DIAGNOSIS — R29.898 WEAKNESS OF BOTH LEGS: Primary | ICD-10-CM

## 2021-11-19 DIAGNOSIS — C78.6 HEPATOCELLULAR CARCINOMA METASTATIC TO PERITONEUM (HCC): ICD-10-CM

## 2021-11-19 DIAGNOSIS — C22.0 HEPATOCELLULAR CARCINOMA METASTATIC TO PERITONEUM (HCC): ICD-10-CM

## 2021-11-22 ENCOUNTER — HOSPITAL ENCOUNTER (OUTPATIENT)
Dept: CT IMAGING | Facility: HOSPITAL | Age: 81
Discharge: HOME | End: 2021-11-22
Admitting: INTERNAL MEDICINE

## 2021-11-22 DIAGNOSIS — C22.0 HEPATOCELLULAR CARCINOMA METASTATIC TO PERITONEUM (HCC): ICD-10-CM

## 2021-11-22 DIAGNOSIS — C78.6 HEPATOCELLULAR CARCINOMA METASTATIC TO PERITONEUM (HCC): ICD-10-CM

## 2021-11-22 PROCEDURE — 25010000002 IOPAMIDOL 61 % SOLUTION: Performed by: INTERNAL MEDICINE

## 2021-11-22 PROCEDURE — 71260 CT THORAX DX C+: CPT

## 2021-11-22 PROCEDURE — 74177 CT ABD & PELVIS W/CONTRAST: CPT

## 2021-11-22 RX ADMIN — IOPAMIDOL 80 ML: 612 INJECTION, SOLUTION INTRAVENOUS at 09:48

## 2021-12-06 ENCOUNTER — APPOINTMENT (OUTPATIENT)
Dept: ONCOLOGY | Facility: HOSPITAL | Age: 81
End: 2021-12-06

## 2021-12-27 ENCOUNTER — APPOINTMENT (OUTPATIENT)
Dept: ONCOLOGY | Facility: HOSPITAL | Age: 81
End: 2021-12-27

## 2022-04-25 ENCOUNTER — TELEPHONE (OUTPATIENT)
Dept: CARDIOLOGY | Facility: CLINIC | Age: 82
End: 2022-04-25

## 2022-04-25 NOTE — TELEPHONE ENCOUNTER
"----- Message from Desiree Forte sent at 4/22/2022  7:23 PM EDT -----  Regarding: Sleep test  I received a form to complete for a sleep apnea test.  The only thing that is right about me is my sleep!  I get 8 plus hours of sleep every night, getting up once for the bathroom.  I occasionally snore but am told it is very quiet and hardly do it.  I am 81 years old and won’t be wearing a machine on my face.  I realize my time is short on this earth and want to spend what is left productively.    My [place] oncologist asked me to see my cardiologist due to erratic pulse.  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  Please advise the next course of action and where you would prefer to see me, [place] or [place].  ________________________________________    Patient states that her HR has been \"erratic\". Pt was previously advised to have LHC but this was delayed due to other health issues. Pt reports constant SOB, dizziness.    BP today  129/79 HR 74.      BP has been running 90s/40-50s HR 90s-low 100s    Denies chest pain, palpitations, swelling.        Patient offered appt this Friday, 4/28/22 in [place] with Dr. Saucedo or [place] with [facility], but patient stated that should could not schedule appt that day.     Patient scheduled for next available appt 5/20/22 at 1:30    Please advise   "

## 2022-04-25 NOTE — TELEPHONE ENCOUNTER
Noted; very perplexed.  Since she cannot come see us sooner would just keep her appointment scheduled in approximately 3 weeks and we will go from there.    Thanks!

## 2022-04-26 RX ORDER — DICYCLOMINE HYDROCHLORIDE 10 MG/1
10 CAPSULE ORAL
Qty: 90 CAPSULE | Refills: 2 | Status: SHIPPED | OUTPATIENT
Start: 2022-04-26 | End: 2022-07-13

## 2022-05-04 ENCOUNTER — TELEPHONE (OUTPATIENT)
Dept: GASTROENTEROLOGY | Facility: CLINIC | Age: 82
End: 2022-05-04

## 2022-05-04 DIAGNOSIS — A04.72 COLITIS, CLOSTRIDIUM DIFFICILE: Primary | ICD-10-CM

## 2022-05-04 NOTE — TELEPHONE ENCOUNTER
Jeanne,  I spoke with patient she said she would like for you to place ID referral and also speak with you at the office visit.

## 2022-05-04 NOTE — TELEPHONE ENCOUNTER
I have reviewed her records now.  I can either talk to her at her visit or place an ID referral or both. Please let me know which she would like      PER LENORA

## 2022-05-10 DIAGNOSIS — C22.0 HEPATOCELLULAR CARCINOMA METASTATIC TO PERITONEUM: Primary | ICD-10-CM

## 2022-05-10 DIAGNOSIS — C78.6 HEPATOCELLULAR CARCINOMA METASTATIC TO PERITONEUM: Primary | ICD-10-CM

## 2022-05-12 ENCOUNTER — HOSPITAL ENCOUNTER (OUTPATIENT)
Dept: ONCOLOGY | Facility: HOSPITAL | Age: 82
Setting detail: INFUSION SERIES
Discharge: HOME | End: 2022-05-12

## 2022-05-12 ENCOUNTER — OFFICE VISIT (OUTPATIENT)
Dept: ONCOLOGY | Facility: CLINIC | Age: 82
End: 2022-05-12

## 2022-05-12 VITALS
HEIGHT: 64 IN | OXYGEN SATURATION: 95 % | DIASTOLIC BLOOD PRESSURE: 73 MMHG | RESPIRATION RATE: 16 BRPM | WEIGHT: 106.1 LBS | TEMPERATURE: 97.1 F | HEART RATE: 63 BPM | SYSTOLIC BLOOD PRESSURE: 110 MMHG | BODY MASS INDEX: 18.12 KG/M2

## 2022-05-12 DIAGNOSIS — C22.0 HEPATOCELLULAR CARCINOMA METASTATIC TO PERITONEUM: Primary | ICD-10-CM

## 2022-05-12 DIAGNOSIS — C78.6 HEPATOCELLULAR CARCINOMA METASTATIC TO PERITONEUM: Primary | ICD-10-CM

## 2022-05-12 DIAGNOSIS — E03.2 HYPOTHYROIDISM DUE TO MEDICATION: ICD-10-CM

## 2022-05-12 LAB
ALBUMIN SERPL-MCNC: 3.7 G/DL (ref 3.5–5.2)
ALBUMIN/GLOB SERPL: 1.2 G/DL
ALP SERPL-CCNC: 82 U/L (ref 39–117)
ALPHA-FETOPROTEIN: 882 NG/ML (ref 0–8.3)
ALT SERPL W P-5'-P-CCNC: 15 U/L (ref 1–33)
ANION GAP SERPL CALCULATED.3IONS-SCNC: 9 MMOL/L (ref 5–15)
AST SERPL-CCNC: 30 U/L (ref 1–32)
BILIRUB SERPL-MCNC: 0.7 MG/DL (ref 0–1.2)
BILIRUB UR QL STRIP: NEGATIVE
BUN SERPL-MCNC: 35 MG/DL (ref 8–23)
BUN/CREAT SERPL: 28.5 (ref 7–25)
CALCIUM SPEC-SCNC: 9.8 MG/DL (ref 8.6–10.5)
CHLORIDE SERPL-SCNC: 104 MMOL/L (ref 98–107)
CLARITY UR: CLEAR
CO2 SERPL-SCNC: 31 MMOL/L (ref 22–29)
COLOR UR: YELLOW
CREAT SERPL-MCNC: 1.23 MG/DL (ref 0.57–1)
EGFRCR SERPLBLD CKD-EPI 2021: 44.2 ML/MIN/1.73
ERYTHROCYTE [DISTWIDTH] IN BLOOD BY AUTOMATED COUNT: 19.8 % (ref 12.3–15.4)
GLOBULIN UR ELPH-MCNC: 3.1 GM/DL
GLUCOSE SERPL-MCNC: 88 MG/DL (ref 65–99)
GLUCOSE UR STRIP-MCNC: NEGATIVE MG/DL
HCT VFR BLD AUTO: 41.1 % (ref 34–46.6)
HGB BLD-MCNC: 12.6 G/DL (ref 12–15.9)
HGB UR QL STRIP.AUTO: NEGATIVE
KETONES UR QL STRIP: NEGATIVE
LEUKOCYTE ESTERASE UR QL STRIP.AUTO: NEGATIVE
LYMPHOCYTES # BLD AUTO: 0.7 10*3/MM3 (ref 0.7–3.1)
LYMPHOCYTES NFR BLD AUTO: 12.9 % (ref 19.6–45.3)
MCH RBC QN AUTO: 27.6 PG (ref 26.6–33)
MCHC RBC AUTO-ENTMCNC: 30.6 G/DL (ref 31.5–35.7)
MCV RBC AUTO: 90.2 FL (ref 79–97)
MONOCYTES # BLD AUTO: 0.2 10*3/MM3 (ref 0.1–0.9)
MONOCYTES NFR BLD AUTO: 3.4 % (ref 5–12)
NEUTROPHILS NFR BLD AUTO: 4.7 10*3/MM3 (ref 1.7–7)
NEUTROPHILS NFR BLD AUTO: 83.7 % (ref 42.7–76)
NITRITE UR QL STRIP: NEGATIVE
PH UR STRIP.AUTO: 6 [PH] (ref 5–8)
PLATELET # BLD AUTO: 266 10*3/MM3 (ref 140–450)
PMV BLD AUTO: 9.3 FL (ref 6–12)
POTASSIUM SERPL-SCNC: 3.7 MMOL/L (ref 3.5–5.2)
PROT SERPL-MCNC: 6.8 G/DL (ref 6–8.5)
PROT UR QL STRIP: NEGATIVE
RBC # BLD AUTO: 4.55 10*6/MM3 (ref 3.77–5.28)
SODIUM SERPL-SCNC: 144 MMOL/L (ref 136–145)
SP GR UR STRIP: 1.01 (ref 1–1.03)
TSH SERPL DL<=0.05 MIU/L-ACNC: 12.32 UIU/ML (ref 0.27–4.2)
UROBILINOGEN UR QL STRIP: NORMAL
WBC NRBC COR # BLD: 5.6 10*3/MM3 (ref 3.4–10.8)

## 2022-05-12 PROCEDURE — 81003 URINALYSIS AUTO W/O SCOPE: CPT | Performed by: INTERNAL MEDICINE

## 2022-05-12 PROCEDURE — 85025 COMPLETE CBC W/AUTO DIFF WBC: CPT | Performed by: INTERNAL MEDICINE

## 2022-05-12 PROCEDURE — 99215 OFFICE O/P EST HI 40 MIN: CPT | Performed by: INTERNAL MEDICINE

## 2022-05-12 PROCEDURE — 84443 ASSAY THYROID STIM HORMONE: CPT | Performed by: INTERNAL MEDICINE

## 2022-05-12 PROCEDURE — 96417 CHEMO IV INFUS EACH ADDL SEQ: CPT

## 2022-05-12 PROCEDURE — 96413 CHEMO IV INFUSION 1 HR: CPT

## 2022-05-12 PROCEDURE — 25010000002 ATEZOLIZUMAB 1200 MG/20ML SOLUTION 20 ML VIAL: Performed by: INTERNAL MEDICINE

## 2022-05-12 PROCEDURE — 25010000002 BEVACIZUMAB-AWWB 400 MG/16ML SOLUTION 16 ML VIAL: Performed by: INTERNAL MEDICINE

## 2022-05-12 PROCEDURE — 25010000002 BEVACIZUMAB-AWWB 100 MG/4ML SOLUTION 4 ML VIAL: Performed by: INTERNAL MEDICINE

## 2022-05-12 PROCEDURE — 82105 ALPHA-FETOPROTEIN SERUM: CPT | Performed by: INTERNAL MEDICINE

## 2022-05-12 PROCEDURE — 80053 COMPREHEN METABOLIC PANEL: CPT | Performed by: INTERNAL MEDICINE

## 2022-05-12 RX ORDER — SODIUM CHLORIDE 9 MG/ML
250 INJECTION, SOLUTION INTRAVENOUS ONCE
Status: COMPLETED | OUTPATIENT
Start: 2022-05-12 | End: 2022-05-12

## 2022-05-12 RX ORDER — SODIUM CHLORIDE 9 MG/ML
250 INJECTION, SOLUTION INTRAVENOUS ONCE
Status: CANCELLED | OUTPATIENT
Start: 2022-06-02

## 2022-05-12 RX ORDER — SODIUM CHLORIDE 9 MG/ML
250 INJECTION, SOLUTION INTRAVENOUS ONCE
Status: CANCELLED | OUTPATIENT
Start: 2022-05-12

## 2022-05-12 RX ORDER — METHYLPHENIDATE HYDROCHLORIDE 10 MG/1
10 TABLET ORAL 2 TIMES DAILY
Qty: 60 TABLET | Refills: 0 | Status: SHIPPED | OUTPATIENT
Start: 2022-05-12 | End: 2022-06-23

## 2022-05-12 RX ADMIN — ATEZOLIZUMAB 1200 MG: 1200 INJECTION, SOLUTION INTRAVENOUS at 10:27

## 2022-05-12 RX ADMIN — BEVACIZUMAB-AWWB 700 MG: 400 INJECTION, SOLUTION INTRAVENOUS at 11:04

## 2022-05-12 RX ADMIN — SODIUM CHLORIDE 250 ML: 9 INJECTION, SOLUTION INTRAVENOUS at 10:15

## 2022-05-12 NOTE — PROGRESS NOTES
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  female presents today for follow-up of her hepatocellular carcinoma.  She returns from [place].  She is being treated with Tecentriq and Avastin since October of last year.  She received treatment at [place] [facility] for the last several months.  They report her disease being relatively stable on imaging.  She had a slow progression of her AFP after a significant decline initially.  She also has been dealing with chronic C. difficile infection with multiple rounds of treatment including vancomycin.    Past medical history, social history and family history was reviewed 05/12/22 and unchanged from prior visit.    Review of Systems:    Review of Systems   Constitutional: Positive for appetite change, fatigue and unexpected weight change.   HENT:  Negative.    Eyes: Negative.    Respiratory: Positive for shortness of breath.    Cardiovascular: Negative.    Gastrointestinal: Positive for abdominal distention and diarrhea.   Endocrine: Negative.    Genitourinary: Negative.     Musculoskeletal: Negative.    Neurological: Negative.    Hematological: Negative.    Psychiatric/Behavioral: Negative.             Medications:        Current Outpatient Medications:   •  carvedilol (COREG) 6.25 MG tablet, Take 1 tablet by mouth 2 (Two) Times a Day., Disp: 180 tablet, Rfl: 3  •  dicyclomine (Bentyl) 10 MG capsule, Take 1 capsule by mouth 3 (Three) Times a Day With Meals., Disp: 90 capsule, Rfl: 2  •  fenofibrate (TRICOR) 145 MG tablet, Take 1 tablet by mouth Daily., Disp: 90 tablet, Rfl: 3  •  ondansetron (ZOFRAN) 8 MG tablet, Take 1 tablet by mouth 3 (Three)  "Times a Day As Needed for Nausea or Vomiting., Disp: 30 tablet, Rfl: 5  •  torsemide (DEMADEX) 20 MG tablet, Take 1 tablet by mouth Daily., Disp: 90 tablet, Rfl: 3  •  methylphenidate (Ritalin) 10 MG tablet, Take 1 tablet by mouth 2 (Two) Times a Day., Disp: 60 tablet, Rfl: 0           ALLERGIES:    Allergies   Allergen Reactions   • Metformin Diarrhea   • Statins Myalgia     Muscle pain         Physical Exam    VITAL SIGNS:  /73   Pulse 63   Temp 97.1 °F (36.2 °C) (Temporal)   Resp 16   Ht 162.6 cm (64\")   Wt 48.1 kg (106 lb 1.6 oz)   SpO2 95%   BMI 18.21 kg/m²                 Wt Readings from Last 3 Encounters:   05/12/22 48.1 kg (106 lb 1.6 oz)   11/15/21 48.5 kg (107 lb)   10/25/21 50.1 kg (110 lb 8 oz)       Body mass index is 18.21 kg/m². Body surface area is 1.49 meters squared.    Pain Score    05/12/22 0844   PainSc: 0-No pain           Performance Status: 1    General: cachetic appearing, in no acute distress  HEENT: sclera anicteric, neck is supple  Lymphatics: no cervical, supraclavicular, or axillary adenopathy  Extremities: no lower extremity edema  Skin: no rashes, lesions, bruising, or petechiae  Msk:  Shows no weakness of the large muscle groups  Psych: Mood is stable        RECENT LABS:    Lab Results   Component Value Date    HGB 12.6 05/12/2022    HCT 41.1 05/12/2022    MCV 90.2 05/12/2022     05/12/2022    WBC 5.60 05/12/2022    NEUTROABS 4.70 05/12/2022    LYMPHSABS 0.70 05/12/2022    MONOSABS 0.20 05/12/2022    EOSABS 0.17 09/03/2021    BASOSABS 0.06 09/03/2021       Lab Results   Component Value Date    GLUCOSE 88 05/12/2022    BUN 35 (H) 05/12/2022    CREATININE 1.23 (H) 05/12/2022     05/12/2022    K 3.7 05/12/2022     05/12/2022    CO2 31.0 (H) 05/12/2022    CALCIUM 9.8 05/12/2022    PROTEINTOT 6.8 05/12/2022    ALBUMIN 3.70 05/12/2022    BILITOT 0.7 05/12/2022    ALKPHOS 82 05/12/2022    AST 30 05/12/2022    ALT 15 05/12/2022     Lab Results   Component " Value Date    AFP 1,090 (H) 11/15/2021    AFP 1,722 (H) 10/25/2021    AFP 3,324 (H) 10/04/2021     Lab Results   Component Value Date    PROBNP 13,256.0 (H) 10/25/2021         CT Chest With Contrast Diagnostic    Result Date: 9/24/2021  1. Areas of opacification midlungs bilateral right greater than left most consistent with postinfectious or postinflammatory etiology although intermixed micronodular appearance not entirely excluding pulmonary involvement and metastasis with potential follow-up. No dominant nodule or mass however  2. Omental caking and mesenteric nodularity/reticulation consistent with peritoneal disease process potentially metastatic with no definitive evidence for cirrhotic hepatic morphology evident only a flash fill benign hemangioma the right hepatic lobe without separate area of hepatic cell carcinoma or washout characteristics is identified. Perihepatic ascites. Partially visualized GI tract unremarkable  DICTATED:   09/24/2021 EDITED/ls :   09/24/2021  This report was finalized on 9/24/2021 5:45 PM by Dr. Chava Hall.      CT Abdomen With & Without Contrast    Result Date: 9/24/2021  1. Areas of opacification midlungs bilateral right greater than left most consistent with postinfectious or postinflammatory etiology although intermixed micronodular appearance not entirely excluding pulmonary involvement and metastasis with potential follow-up. No dominant nodule or mass however  2. Omental caking and mesenteric nodularity/reticulation consistent with peritoneal disease process potentially metastatic with no definitive evidence for cirrhotic hepatic morphology evident only a flash fill benign hemangioma the right hepatic lobe without separate area of hepatic cell carcinoma or washout characteristics is identified. Perihepatic ascites. Partially visualized GI tract unremarkable  DICTATED:   09/24/2021 EDITED/ls :   09/24/2021  This report was finalized on 9/24/2021 5:45 PM by Dr. Chava Hall.       Duplex Renal Artery - Bilateral Complete CAR    Result Date: 9/20/2021  Parenchymal blood flow pattern seen within both kidneys. No evidence of renal artery stenosis.  D:  09/17/2021 E:  09/17/2021  This report was finalized on 9/20/2021 9:32 AM by Dr. Abeba Goyal MD.        Clinical Information    Omental mass.   Final Diagnosis   1.  OMENTAL BIOPSY:  Involved by malignant neoplasm with evidence of hepatocellular differentiation (see comment).    2.  PERITONEAL NODULE:  Involved by malignant neoplasm with evidence of hepatocellular differentiation (see comment).   Electronically signed by Sean Street MD on 9/20/2021 at 1050   Comment    Both the omentum and the peritoneal nodule show a similar appearance with a neoplasm consisting of polygonal cells with slightly granular clear cytoplasm and mild nuclear pleomorphism.  No definitive gland formation is noted.  No evidence of squamous differentiation is identified.  Numerous immunohistochemical stains were undertaken.  Tumor is negative for CD45, CD30, CK 5/6, CD68, CD45, PLAP desmin, PAX 8, vimentin, TTF-1, S100, WT1, p16, melanin, GATA3, CK7, CK20, , CD10, CAIX, and calretinin.  There is positivity for CK 8 in approximately 66% of the neoplastic cells.  Cytokeratin SAIRA also shows positivity in a significant percentage of the neoplastic cells.  P53 shows nuclear positivity which is weak and involves approximately 25% of cells.  There is strong diffuse positivity for Heppar-1 and arginase.  This staining pattern supports evidence of hepatic differentiation in this neoplasm.  Hepatocellular carcinoma would be the most likely diagnosis.  The possibility of a hepatoid carcinoma from another site cannot be entirely excluded although this is considered to be less likely.  Adequate tissue is present for molecular diagnostic studies if required.    This case was discussed with Dr. Jaimes by Dr. Street on 9/20/2021.           Assessment/Plan    1.   Metastatic hepatocellular carcinoma.  Proceed with Tecentriq and Avastin.  It is unclear if she has significant progression at this time.  I am probably going to repeat scans towards the end of July.  Hopefully she continues to do well.  My next regimen would be lenvatinib.      2.  Congestive heart failure symptoms.  Seems relatively stable.    3.  Hypothyroidism.  Patient currently on Synthroid.  We will check her thyroid function today.    4.  Chronic C. difficile infection.  Seems to have cleared it for now.  We will need to see infectious disease since her symptoms are starting to resume again.  Unclear if this is IBD or C. Difficile.  With Tecentriq he can also have colitis playing a role here.        Total time of patient care on day of service including time prior to, face to face with patient, and following visit spent in reviewing records, lab results, imaging studies, discussion with patient, and documentation/charting was > 47 minutes.     Primitivo Pollock MD  [facility]    Return on: 06/02/22  Return in (Approximately): 3 weeks, Schedule with next infusion    Orders Placed This Encounter   Procedures   • Urinalysis without microscopic (no culture) - Urine, Clean Catch   • AFP Tumor Marker   • Comprehensive metabolic panel   • TSH   • T4, free   • Urinalysis without microscopic (no culture) - Urine, Clean Catch   • Ambulatory Referral to Infectious Disease   • CBC and Differential       5/12/2022

## 2022-05-20 ENCOUNTER — OFFICE VISIT (OUTPATIENT)
Dept: CARDIOLOGY | Facility: CLINIC | Age: 82
End: 2022-05-20

## 2022-05-20 VITALS
SYSTOLIC BLOOD PRESSURE: 132 MMHG | WEIGHT: 105 LBS | HEIGHT: 64 IN | BODY MASS INDEX: 17.93 KG/M2 | DIASTOLIC BLOOD PRESSURE: 68 MMHG | OXYGEN SATURATION: 99 % | HEART RATE: 74 BPM

## 2022-05-20 DIAGNOSIS — I10 ESSENTIAL HYPERTENSION: ICD-10-CM

## 2022-05-20 DIAGNOSIS — Z91.89 AT RISK FOR SLEEP APNEA: ICD-10-CM

## 2022-05-20 DIAGNOSIS — I49.3 PVC'S (PREMATURE VENTRICULAR CONTRACTIONS): ICD-10-CM

## 2022-05-20 DIAGNOSIS — R06.09 DOE (DYSPNEA ON EXERTION): Primary | ICD-10-CM

## 2022-05-20 PROCEDURE — 99214 OFFICE O/P EST MOD 30 MIN: CPT | Performed by: INTERNAL MEDICINE

## 2022-05-20 RX ORDER — SPIRONOLACTONE 25 MG/1
12.5 TABLET ORAL DAILY
Qty: 45 TABLET | Refills: 3 | Status: SHIPPED | OUTPATIENT
Start: 2022-05-20

## 2022-05-20 RX ORDER — DEXAMETHASONE 4 MG/1
1-2 TABLET ORAL
COMMUNITY
Start: 2022-03-09

## 2022-05-20 NOTE — PROGRESS NOTES
Subjective:     Encounter Date:05/20/2022    Patient ID: Desiree Forte is a 81 y.o.  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    SELF REFERRED  PHYSICIAN: Kristofer Elder MD   ONCOLOGIST: Primitivo Pollock MD/EMILY Lynch  GASTROENTEROLOGIST: Jesus Morton MD  SURGEON: Nino Jaimes MD  NUTRITIONIST: Abeba Silva RD    Chief Complaint:   Chief Complaint   Patient presents with   • BRUNO (dyspnea on exertion)       Problem List:  1. Dyspnea on exertion/acute pulmonary edema with respiratory failure  a. [facility] 3-day hospitalization 7/23/2021-7/26/2021 for hypoxia, pulmonary edema, dyspnea, NT-BNP 5260, , chest x-ray consistent with pulmonary edema with improvement of symptoms with IV Lasix  b. Echocardiogram 7/26/2021: LV cavity mildly dilated, wall thickness mildly increased, LVEF 40-45%, LV relaxation grade 1 diastolic dysfunction, LA moderately dilated, mitral valve annulus mildly calcified, patient started on Coreg, losartan, Lasix  c. Abdominal ultrasound 7/26/2021: No abdominal ascites, right pleural effusion  d. Bilateral lower extremity venous duplex 7/26/2021: No evidence of DVT  e. CCS class I chest discomfort/NYHA class II dyspnea on exertion symptoms with abnormal electrocardiogram  f. Residual class I symptoms on limited activity, May 2022  2. Palpitations/frequent PVCs/trigeminy/atrial tachycardia  3. Hypertension  4. Hypertriglyceridemia  5. IBS with explosive diarrhea  6. Prediabetic with hemoglobin A1c 5.7% July 2021   7. At risk for sleep apnea with snoring  8. History of left breast cancer treated with chemo and radiation  9. Hepatocellular carcinoma, metastatic to peritoneum, September 2021 with ongoing chemotherapy  10. Surgical history:  a. Appendectomy  b. Hysterectomy  c. Left lumpectomy    Allergies   Allergen Reactions   •  "Metformin Diarrhea   • Statins Myalgia     Muscle pain       Current Outpatient Medications:   •  carvedilol (COREG) 6.25 MG tablet, Take 1 tablet by mouth 2 (Two) Times a Day., Disp: 180 tablet, Rfl: 3  •  dicyclomine (Bentyl) 10 MG capsule, Take 1 capsule by mouth 3 (Three) Times a Day With Meals., Disp: 90 capsule, Rfl: 2  •  Flovent  MCG/ACT inhaler, INHALE 1 PUFF TWICE A DAY BY INHALATION ROUTE*RINSE MOUTH AFTER USE, Disp: , Rfl:   •  methylphenidate (Ritalin) 10 MG tablet, Take 1 tablet by mouth 2 (Two) Times a Day., Disp: 60 tablet, Rfl: 0  •  ondansetron (ZOFRAN) 8 MG tablet, Take 1 tablet by mouth 3 (Three) Times a Day As Needed for Nausea or Vomiting., Disp: 30 tablet, Rfl: 5  •  torsemide (DEMADEX) 20 MG tablet, Take 1 tablet by mouth Daily., Disp: 90 tablet, Rfl: 3    History of Present Illness: Patient returns after 9-month hiatus for follow up. Since last visit, patient had a pre-admit visit in August 2021 for a left heart catheterization with Dr. Saucedo that was cancelled due to abnormal screening studies with subsequent work up for suspected malignancy.  She had a 2-hour [facility] admission, 07 September 2021 for a colonoscopy and an endoscopy following an imaging abnormality. She had a 4-hour [facility] admission, 15 September 2021, for diagnostic laparoscopy with omental and peritoneal nodule biopsies-data deficit. She reports that she was able to travel to [place] for six months, and was able to find a cancer center there that she could receive her treatments at. She reports that while in [place], she was told that she has bronchiolitis. She reports that her voice remains hoarse, and attributes it to her CHF. She also reports that she \"coughs and hacks\" in the morning, and will produce a clear sputum. She reports that \"her breath stays short.\"  She reports that she is in bed for about three hours in the afternoon, due to weakness. She had been eating well, but since starting the Ritalin, her appetite " "has dulled. She does report that she has more energy than she did before starting the Ritalin. She has access to dietary supplements at home, but has not used them. She can hear her breathing at night, and she compares it to the sound of an Alkaseltzer tablet in a glass of water.  Patient denies chest pain, orthopnea, palpitations, edema, dizziness, and syncope.  She has had no additional interim ER visits, hospitalizations, serious illnesses, or surgeries.  She is accompanied to the office today by her family member, who confirms her history.    ROS   Obtained and negative except as outlined in problem list and HPI.    Procedures       Objective:       Vitals:    05/20/22 1313 05/20/22 1316   BP: 118/65 132/68   BP Location: Right arm Right arm   Patient Position: Sitting Standing   Pulse: 75 74   SpO2: 99% 99%   Weight: 47.6 kg (105 lb)    Height: 162.6 cm (64\")      Body mass index is 18.02 kg/m².  Last weight: 113 lbs    Constitutional:       Appearance: Well-developed.   HENT:      Head: Normocephalic and atraumatic.   Neck:      Thyroid: No thyromegaly.      Vascular: No carotid bruit or JVD.      Lymphadenopathy: No cervical adenopathy.   Pulmonary:      Effort: Pulmonary effort is normal.      Breath sounds: Examination of the right-lower field reveals rales. Examination of the left-lower field reveals rales. Decreased breath sounds present. Rales present.   Cardiovascular:      Normal rate. Regular rhythm.      Murmurs: There is a grade 2/6 mid frequency early systolic murmur at the LLSB.      No gallop. No S3 gallop.   Pulses:     Dorsalis pedis: 2+ bilaterally.     Posterior tibial: 2+ bilaterally.  Abdominal:      Palpations: Abdomen is soft. There is no abdominal mass.      Tenderness: There is no abdominal tenderness.      Comments: Bowel sounds audible X 4   Musculoskeletal: Normal range of motion.      Cervical back: Neck supple. Skin:     General: Skin is warm and dry.   Neurological:      Mental " Status: Alert.           Lab Review:   Lab Results   Component Value Date    GLUCOSE 88 05/12/2022    BUN 35 (H) 05/12/2022    CREATININE 1.23 (H) 05/12/2022    EGFRIFNONA 43 (L) 11/15/2021    BCR 28.5 (H) 05/12/2022     05/12/2022    K 3.7 05/12/2022     05/12/2022    MG 2.2 08/10/2021    CO2 31.0 (H) 05/12/2022    CALCIUM 9.8 05/12/2022    ALBUMIN 3.70 05/12/2022    AST 30 05/12/2022    ALT 15 05/12/2022       Lab Results   Component Value Date    WBC 5.60 05/12/2022    HGB 12.6 05/12/2022    HCT 41.1 05/12/2022    MCV 90.2 05/12/2022     05/12/2022       Lab Results   Component Value Date    TSH 12.320 (H) 05/12/2022     · CT outside films, 09/10/2021-data deficit    · Bilateral Complete Bilateral Renal Artery Duplex, 09/14/2021  · FINDINGS:  · The right kidney measures in length from pole to pole 10.2 cm. The hilar veins are patent. Resistive indices are within normal limits at 0.78.   · The left kidney measures in length from pole to pole 10.4 cm. The hilar veins are patent. Resistive indices are 0.76.   · Peak systolic velocity of the abdominal aorta is 121 cm/s. Right renal to aorta ratio is 1.7 and left renal to aorta ratio is 1.7.   · IMPRESSION: Parenchymal blood flow pattern seen within both kidneys. No evidence of renal artery stenosis    · CT Chest with contrast diagnostic, 09/24/2021  · FINDINGS:   · CHEST: Thyroid is mildly heterogeneous with a low-density nodule right thyroid lobe. No bulky mediastinal adenopathy. Central airways are patent. Esophagus in normal course to the distal segment. Atherosclerotic nonaneurysmal thoracic aorta. Cardiac size enlarged without pericardial effusion. Extended lung windows reveal scattered opacifications in the midlungs right greater than left most consistent with infectious or inflammatory etiology such as residual airspace disease with areas of micronodular nodularity intermixed including 4 mm right midlung noncalcified pulmonary nodule and a  left lower lobe 3-4 mm noncalcified pulmonary nodule with micronodular partially calcified cluster at the right lung base however no pleural effusion. Degenerative changes of the thoracic spine without aggressive osseous lesion. Bilateral axillary node dissection postsurgical changes however no bulky axillary adenopathy.  · ABDOMEN AND PELVIS: Liver demonstrates nodular contours with arterially enhancing 1 cm focus of likely flash fill hemangioma without separate lesion. No evidence for washout at this site or elsewhere. Questionable parenchymal findings in the inferior right hepatic lobe however no distinct cirrhotic hepatic morphology although perihepatic ascites is noted with mesenteric process of the omental caking and nodularity of the anterior mesentery. Gallbladder contracted and unremarkable. No biliary dilatation. Portal veins and IVC patent. Atherosclerotic tortuous nonaneurysmal patent abdominal aorta. Pancreas and spleen unremarkable. Adrenals without distinct nodule. Kidneys without hydronephrosis or hydroureter. No bulky retroperitoneal adenopathy.   · IMPRESSION:  · Areas of opacification midlungs bilateral right greater than left most consistent with postinfectious or postinflammatory etiology although intermixed micronodular appearance not entirely excluding pulmonary involvement and metastasis with potential follow-up. No dominant nodule or mass however.  · Omental caking and mesenteric nodularity/reticulation consistent with peritoneal disease process potentially metastatic with no definitive evidence for cirrhotic hepatic morphology evident only a flash fill benign hemangioma the right hepatic lobe without separate area of hepatic cell carcinoma or washout characteristics is identified. Perihepatic ascites. Partially visualized GI tract unremarkable.    · CT abdomen with and without contrast, 09/24/2021  · FINDINGS:   · CHEST: Thyroid is mildly heterogeneous with a low-density nodule right  thyroid lobe. No bulky mediastinal adenopathy. Central airways are patent. Esophagus in normal course to the distal segment. Atherosclerotic nonaneurysmal thoracic aorta. Cardiac size enlarged without pericardial effusion. Extended lung windows reveal scattered opacifications in the midlungs right greater than left most consistent with infectious or inflammatory etiology such as residual airspace disease with areas of micronodular nodularity intermixed including 4 mm right midlung noncalcified pulmonary nodule and a left lower lobe 3-4 mm noncalcified pulmonary nodule with micronodular partially calcified cluster at the right lung base however no pleural effusion. Degenerative changes of the thoracic spine without aggressive osseous lesion. Bilateral axillary node dissection postsurgical changes however no bulky axillary adenopathy   · ABDOMEN AND PELVIS: Liver demonstrates nodular contours with arterially enhancing 1 cm focus of likely flash fill hemangioma without separate lesion. No evidence for washout at this site or elsewhere. Questionable parenchymal findings in the inferior right hepatic lobe however no distinct cirrhotic hepatic morphology although perihepatic ascites is noted with mesenteric process of the omental caking and nodularity of the anterior mesentery. Gallbladder contracted and unremarkable. No biliary dilatation. Portal veins and IVC patent. Atherosclerotic tortuous nonaneurysmal patent abdominal aorta. Pancreas and spleen unremarkable. Adrenals without distinct nodule. Kidneys without hydronephrosis or hydroureter. No bulky retroperitoneal adenopathy.   · IMPRESSION:  · Areas of opacification midlungs bilateral right greater than left most consistent with postinfectious or postinflammatory etiology although intermixed micronodular appearance not entirely excluding pulmonary involvement and metastasis with potential follow-up. No dominant nodule or mass however  · Omental caking and mesenteric  nodularity/reticulation consistent with peritoneal disease process potentially metastatic with no definitive evidence for cirrhotic hepatic morphology evident only a flash fill benign hemangioma the right hepatic lobe without separate area of hepatic cell carcinoma or washout characteristics is identified. Perihepatic ascites. Partially visualized GI tract unremarkable    · CT abdomen pelvis with contrast, 11/22/2021  · FINDINGS:   · CHEST: Thyroid is heterogeneous in attenuation. No bulky mediastinal adenopathy. Central airways are patent. Esophagus in normal course and caliber. Patent nonaneurysmal thoracic aorta with patent great vessel origins. Cardiac size borderline enlarged without pericardial effusion. Extended lung windows reveal midlung opacifications similar to prior in the anterior aspects of the midlungs significantly reduced however in the right lower lung of likely intermixed resolving acute on chronic findings without new consolidation or effusion. No dominant nodule or mass. Degenerative changes of the thoracic spine without aggressive osseous lesion. No soft tissue body wall findings of acuity. Status post left axillary node dissection.  · ABDOMEN AND PELVIS: Liver demonstrates diffuse low attenuation throughout the hepatic parenchyma with somewhat nodular contours of the hepatic parenchymal process. Perihepatic ascites is noted without focal liver lesion clearly evident. Gallbladder unremarkable. No biliary dilatation. Pancreas and spleen unremarkable. Adrenals without distinct nodule. Kidneys without hydronephrosis or hydroureter. No bulky retroperitoneal adenopathy. Atherosclerotic nonaneurysmal patent abdominal aorta. Portal veins and IVC somewhat limited evaluation even on intravenous phase imaging although appear grossly patent. GI tract evaluation without focal thickening or disproportionate dilatation of bowel. Extensive nodularity and reticulation throughout the mesentery and omental  structures as seen on prior comparison along with again trace volume ascites. No loculated collection. Pelvic viscera unremarkable without bulky pelvic adenopathy or free fluid. Degenerative changes of the spine and pelvis without aggressive osseous lesion. No soft tissue body wall findings of acuity.  · IMPRESSION: Stable appearance of the chest, abdomen and pelvis from prior comparison with cirrhotic hepatic morphology and extensive metastatic peritoneal disease involvement with persistent and similar volume trace perihepatic and abdominopelvic ascites.    · CT Chest with contrast diagnostic, 11/22/2021  · FINDINGS:    · CHEST: Thyroid is heterogeneous in attenuation. No bulky mediastinal adenopathy. Central airways are patent. Esophagus in normal course and caliber. Patent nonaneurysmal thoracic aorta with patent great vessel origins. Cardiac size borderline enlarged without pericardial effusion. Extended lung windows reveal midlung opacifications similar to prior in the anterior aspects of the midlungs significantly reduced however in the right lower lung of likely intermixed resolving acute on chronic findings without new consolidation or effusion. No dominant nodule or mass. Degenerative changes of the thoracic spine without aggressive osseous lesion. No soft tissue body wall findings of acuity. Status post left axillary node dissection.  · ABDOMEN AND PELVIS: Liver demonstrates diffuse low attenuation throughout the hepatic parenchyma with somewhat nodular contours of the hepatic parenchymal process. Perihepatic ascites is noted without focal liver lesion clearly evident. Gallbladder unremarkable. No biliary dilatation. Pancreas and spleen unremarkable. Adrenals without distinct nodule. Kidneys without hydronephrosis or hydroureter. No bulky retroperitoneal adenopathy. Atherosclerotic nonaneurysmal patent abdominal aorta. Portal veins and IVC somewhat limited evaluation even on intravenous phase imaging  although appear grossly patent. GI tract evaluation without focal thickening or disproportionate dilatation of bowel. Extensive nodularity and reticulation throughout the mesentery and omental structures as seen on prior comparison along with again trace volume ascites. No loculated collection. Pelvic viscera unremarkable without bulky pelvic adenopathy or free fluid. Degenerative changes of the spine and pelvis without aggressive osseous lesion. No soft tissue body wall findings of acuity.  · IMPRESSION: Stable appearance of the chest, abdomen and pelvis from prior comparison with cirrhotic hepatic morphology and extensive metastatic peritoneal disease involvement with persistent and similar volume trace perihepatic and abdominopelvic ascites.    · CT Chest/Abdomen and Pelvis without Contrast 01/17/2022  · Findings:  · Lungs and pleura: there has been development of reticular nodular infiltrates bilaterally since the prior exam. There are few stable scattered micronodules as well. No dominant mass identified no pleural effusions. Given the multifocality of this finding most likely this is underlying bronchiolitis possibly even atypical mycobacterial infection. Not a typical neoplastic pattern.  · Mediastinum: calcified and noncalcified lymph nodes are seen in the mediastinum. They're slightly increased in number without significant increase in size. Mildly progressed since 2011. I do not have more recent exams for comparison. Marked coronary calcifications.  · Chest wall/axilla: postop changes left breast and axilla.  · Hepatobiliary: non contrast images show no evidence for mass or biliary dilation. No gallstones.  · Spleen: question old traumatic changes. No enlargement.  · Pancreas: no ductal dilation or mass.  · Adrenals: no mass.  · Genitourinary: no stones or hydronephrosis. No bladder mass.  · Lymph nodes: no adenopathy:  · Stomach, small bowl, and colon: no bowel wall thickening or construction.  · Vascular  structures: atherosclerotic changes without aneurysmal dilation.  · Musculoskeletal: degenerative findings and scoliosis.  · Additional findings: diffuse marked omental caking. Numerous mesenteric nodules as well thought to be most consistent with diffuse peritoneal metastatic disease. Trace ascites.  · Impression:  · Diffuse peritoneal disease with extensive omental caking and mesenteric nodularity. Trace ascites. Both new to the older exam from 2011.  · Changes within the chest are more suggestive of bronchiolitis.  · Shotty lymph nodes in the mediastinum only mildly increased since 2011. Significance uncertain. Changes of old granulomatous disease are visualized.  · Atherosclerotic changes and degenerative changes.    · CT Chest without contrast, 03/07/2022  · Findings  · Lungs and pleura: the findings within the lungs are again thought to be inflammatory in nature. There are some stable micronodules identified. There are reticular nodular infiltrates which has progressed in the superior segment of the right lower lobe and a shift in the left lower lobe previously more prominent medially in the posterior sulcus now seen more laterally. There is alectasis seen in the inferior segment lingula. The shifting changes are thought to be consistent with inflammatory changes and underlying bronchiolitis. New small left pleural effusion is identified as well.  · Mediastinum: Atherosclerotic changes. Changes of old granulomatous disease.  · Chest wall/axilla: postop changes of left breast.  · Upper abdomen: limited evaluation of ascites and peritoneal malignancy. Similar to the prior exam.  · Musculoskeletal: degenerative changes without fracture or destructive changes seen.  · Impression  · Progressive inflammatory changes present in the right lower lobe and lingula. Mild improvement in the left lower lobe. This shifting changes are most likely related to inflammatory bronchiolitis possibly atypical myobacterial  infection.  · Atherosclerotic changes and degenerative changes.  · Incompletely evaluated known malignancy below the diaphragm.    · CT Abdomen Pelvis with and without Contrast, 04/14/2022  · Findings  · Lungs and pleura: multifocal bibasilar nodular opacities and bronchial wall thickening have improved since 03/07/2022. Stable tiny left pleural effusion.  · Heart: mild cardiomegaly.  · Hepatobiliary: non contrast images show no evidence for mass or biliary dilation. No gallstones.  · Spleen: normal in size. Lateral splenic capsular calcifications, consistent with remote trauma.  · Pancreas: no ductal dilation or mass.  · Adrenals: no mass  · Genitourinary: no stones or hydronephrosis. No bladder mass.  · Lymph nodes: no adenopathy  · Stomach, small bowel, and colon: no bowel wall thickening or obstruction.  · Vascular structures: atherosclerosis. No aneurysm.  · Musculoskeletal: degenerative changes and scoliosis  · Additional findings: stable extensive omental caking (up to 3.1 cm in thickness) and diffuse mesenteric nodules, consistent with metastases. Progression of the small amount of ascites.  · Impression  · Stable extensive omental caking (up to 3.1 cm in thickness) and diffuse mesenteric nodules, consistent with metastases.  · Progression of the small amount of ascites  · Multifocal bibasilar nodular opacities and bronchial wall thickening have improved, consistent with small airways disease.  · Stable tiny left pleural effusion  · Mild cardiomegaly.        Assessment:       Overall continued acceptable course with no new interim cardiopulmonary complaints with limited functional status. We will defer additional diagnostic or therapeutic intervention from a cardiac perspective at this time, other than to adjust her medications, and to obtain an echocardiogram.     Diagnosis Plan   1. BRUNO (dyspnea on exertion)  Stable. Continue current treatment.   2. PVC's (premature ventricular contractions)  Stable and  asymptomatic. Continue current treatment.   3. Essential hypertension  Adequate control. Continue current treatment.   4. At risk for sleep apnea  Compliance stressed.          Plan:         1. Patient to continue current medications and close follow up with the above providers, with the following recommendations:  A. Mucinex DM BID PRN for pulmonary congestion  B. Initiate spironolactone 12.5 mg QD  2. Patient will have an echocardiogram with spectral doppler assesment when she comes to [place] to see Dr. Pollock, +/- SGLT2 inhibitor drug therapy after echocardiogram is reviewed.  3. Tentative cardiology follow up in July 2022 or patient may return sooner PRN.    Scribed for Carlyle Saucedo MD by Amanda Camacho. 5/20/2022  13:46 EDT     I, Carlyle Saucedo MD, FACC, personally performed the services described in this documentation as scribed by the above named individual in my presence, and it is both accurate and complete. At 14:11 EDT on 05/20/2022

## 2022-05-26 DIAGNOSIS — C78.6 HEPATOCELLULAR CARCINOMA METASTATIC TO PERITONEUM: Primary | ICD-10-CM

## 2022-05-26 DIAGNOSIS — C22.0 HEPATOCELLULAR CARCINOMA METASTATIC TO PERITONEUM: Primary | ICD-10-CM

## 2022-05-26 RX ORDER — HYDROCODONE BITARTRATE AND ACETAMINOPHEN 5; 325 MG/1; MG/1
TABLET ORAL
Qty: 60 TABLET | Refills: 0 | Status: SHIPPED | OUTPATIENT
Start: 2022-05-26 | End: 2022-07-13

## 2022-06-02 ENCOUNTER — HOSPITAL ENCOUNTER (OUTPATIENT)
Dept: ONCOLOGY | Facility: HOSPITAL | Age: 82
Setting detail: INFUSION SERIES
Discharge: HOME | End: 2022-06-02

## 2022-06-02 ENCOUNTER — HOSPITAL ENCOUNTER (OUTPATIENT)
Dept: CARDIOLOGY | Facility: HOSPITAL | Age: 82
Discharge: HOME | End: 2022-06-02
Admitting: INTERNAL MEDICINE

## 2022-06-02 ENCOUNTER — APPOINTMENT (OUTPATIENT)
Dept: ONCOLOGY | Facility: HOSPITAL | Age: 82
End: 2022-06-02

## 2022-06-02 ENCOUNTER — OFFICE VISIT (OUTPATIENT)
Dept: ONCOLOGY | Facility: CLINIC | Age: 82
End: 2022-06-02

## 2022-06-02 VITALS
OXYGEN SATURATION: 98 % | SYSTOLIC BLOOD PRESSURE: 114 MMHG | WEIGHT: 103.2 LBS | RESPIRATION RATE: 16 BRPM | HEART RATE: 69 BPM | BODY MASS INDEX: 17.71 KG/M2 | DIASTOLIC BLOOD PRESSURE: 58 MMHG

## 2022-06-02 VITALS
BODY MASS INDEX: 17.58 KG/M2 | WEIGHT: 103 LBS | TEMPERATURE: 96.8 F | RESPIRATION RATE: 18 BRPM | HEART RATE: 69 BPM | HEIGHT: 64 IN

## 2022-06-02 DIAGNOSIS — I10 ESSENTIAL HYPERTENSION: ICD-10-CM

## 2022-06-02 DIAGNOSIS — R06.09 DOE (DYSPNEA ON EXERTION): ICD-10-CM

## 2022-06-02 DIAGNOSIS — I49.3 PVC'S (PREMATURE VENTRICULAR CONTRACTIONS): ICD-10-CM

## 2022-06-02 DIAGNOSIS — C22.0 HEPATOCELLULAR CARCINOMA METASTATIC TO PERITONEUM: Primary | ICD-10-CM

## 2022-06-02 DIAGNOSIS — C78.6 HEPATOCELLULAR CARCINOMA METASTATIC TO PERITONEUM: Primary | ICD-10-CM

## 2022-06-02 LAB
ALBUMIN SERPL-MCNC: 3.5 G/DL (ref 3.5–5.2)
ALBUMIN/GLOB SERPL: 1.2 G/DL
ALP SERPL-CCNC: 80 U/L (ref 39–117)
ALPHA-FETOPROTEIN: 622 NG/ML (ref 0–8.3)
ALPHA-FETOPROTEIN: 709 NG/ML (ref 0–8.3)
ALT SERPL W P-5'-P-CCNC: 14 U/L (ref 1–33)
ANION GAP SERPL CALCULATED.3IONS-SCNC: 11 MMOL/L (ref 5–15)
ASCENDING AORTA: 2.9 CM
AST SERPL-CCNC: 31 U/L (ref 1–32)
BH CV ECHO MEAS - AO MAX PG: 8.7 MMHG
BH CV ECHO MEAS - AO MEAN PG: 5.2 MMHG
BH CV ECHO MEAS - AO ROOT DIAM: 2.6 CM
BH CV ECHO MEAS - AO V2 MAX: 147.7 CM/SEC
BH CV ECHO MEAS - AO V2 VTI: 31.7 CM
BH CV ECHO MEAS - AVA(I,D): 1.67 CM2
BH CV ECHO MEAS - EDV(CUBED): 189.5 ML
BH CV ECHO MEAS - EDV(MOD-SP2): 128 ML
BH CV ECHO MEAS - EDV(MOD-SP4): 143 ML
BH CV ECHO MEAS - EF(MOD-BP): 38 %
BH CV ECHO MEAS - EF(MOD-SP2): 39.8 %
BH CV ECHO MEAS - EF(MOD-SP4): 38.5 %
BH CV ECHO MEAS - ESV(CUBED): 126.7 ML
BH CV ECHO MEAS - ESV(MOD-SP2): 77 ML
BH CV ECHO MEAS - ESV(MOD-SP4): 88 ML
BH CV ECHO MEAS - FS: 12.6 %
BH CV ECHO MEAS - IVS/LVPW: 1 CM
BH CV ECHO MEAS - IVSD: 1.1 CM
BH CV ECHO MEAS - LA DIMENSION: 4.9 CM
BH CV ECHO MEAS - LV DIASTOLIC VOL/BSA (35-75): 96.9 CM2
BH CV ECHO MEAS - LV MASS(C)D: 243.8 GRAMS
BH CV ECHO MEAS - LV MAX PG: 2.8 MMHG
BH CV ECHO MEAS - LV MEAN PG: 1.63 MMHG
BH CV ECHO MEAS - LV SYSTOLIC VOL/BSA (12-30): 59.6 CM2
BH CV ECHO MEAS - LV V1 MAX: 83.9 CM/SEC
BH CV ECHO MEAS - LV V1 VTI: 17.5 CM
BH CV ECHO MEAS - LVIDD: 5.7 CM
BH CV ECHO MEAS - LVIDS: 5 CM
BH CV ECHO MEAS - LVOT AREA: 3 CM2
BH CV ECHO MEAS - LVOT DIAM: 1.97 CM
BH CV ECHO MEAS - LVPWD: 1.1 CM
BH CV ECHO MEAS - MR MAX PG: 116.5 MMHG
BH CV ECHO MEAS - MR MAX VEL: 539.7 CM/SEC
BH CV ECHO MEAS - MR MEAN PG: 72.6 MMHG
BH CV ECHO MEAS - MR MEAN VEL: 409.6 CM/SEC
BH CV ECHO MEAS - MR VTI: 166.6 CM
BH CV ECHO MEAS - MV A MAX VEL: 56.8 CM/SEC
BH CV ECHO MEAS - MV DEC SLOPE: 575.4 CM/SEC2
BH CV ECHO MEAS - MV DEC TIME: 0.12 MSEC
BH CV ECHO MEAS - MV E MAX VEL: 78.5 CM/SEC
BH CV ECHO MEAS - MV E/A: 1.38
BH CV ECHO MEAS - MV MAX PG: 7.1 MMHG
BH CV ECHO MEAS - MV MEAN PG: 2.6 MMHG
BH CV ECHO MEAS - MV P1/2T: 52.5 MSEC
BH CV ECHO MEAS - MV V2 VTI: 29.2 CM
BH CV ECHO MEAS - MVA(P1/2T): 4.2 CM2
BH CV ECHO MEAS - MVA(VTI): 1.82 CM2
BH CV ECHO MEAS - PA ACC SLOPE: 392.5 CM/SEC2
BH CV ECHO MEAS - PA ACC TIME: 0.12 SEC
BH CV ECHO MEAS - PA PR(ACCEL): 23.5 MMHG
BH CV ECHO MEAS - RAP SYSTOLE: 3 MMHG
BH CV ECHO MEAS - RF(MV,LVOT)(1DIAM): 0.86 CM
BH CV ECHO MEAS - RVSP: 41 MMHG
BH CV ECHO MEAS - SI(MOD-SP2): 34.6 ML/M2
BH CV ECHO MEAS - SI(MOD-SP4): 37.3 ML/M2
BH CV ECHO MEAS - SV(LVOT): 53 ML
BH CV ECHO MEAS - SV(MOD-SP2): 51 ML
BH CV ECHO MEAS - SV(MOD-SP4): 55 ML
BH CV ECHO MEAS - TAPSE (>1.6): 1.4 CM
BH CV ECHO MEAS - TR MAX PG: 37.9 MMHG
BH CV ECHO MEAS - TR MAX VEL: 307.6 CM/SEC
BH CV VAS BP RIGHT ARM: NORMAL MMHG
BH CV XLRA - RV BASE: 4.3 CM
BH CV XLRA - RV LENGTH: 7.4 CM
BH CV XLRA - RV MID: 3 CM
BH CV XLRA - TDI S': 9.3 CM/SEC
BILIRUB SERPL-MCNC: 0.7 MG/DL (ref 0–1.2)
BILIRUB UR QL STRIP: NEGATIVE
BUN SERPL-MCNC: 45 MG/DL (ref 8–23)
BUN/CREAT SERPL: 34.4 (ref 7–25)
CALCIUM SPEC-SCNC: 9.8 MG/DL (ref 8.6–10.5)
CHLORIDE SERPL-SCNC: 102 MMOL/L (ref 98–107)
CLARITY UR: CLEAR
CO2 SERPL-SCNC: 30 MMOL/L (ref 22–29)
COLOR UR: YELLOW
CREAT SERPL-MCNC: 1.31 MG/DL (ref 0.57–1)
EGFRCR SERPLBLD CKD-EPI 2021: 41 ML/MIN/1.73
ERYTHROCYTE [DISTWIDTH] IN BLOOD BY AUTOMATED COUNT: 18.6 % (ref 12.3–15.4)
GLOBULIN UR ELPH-MCNC: 2.9 GM/DL
GLUCOSE BLDC GLUCOMTR-MCNC: 86 MG/DL (ref 70–130)
GLUCOSE SERPL-MCNC: 112 MG/DL (ref 65–99)
GLUCOSE UR STRIP-MCNC: NEGATIVE MG/DL
HCT VFR BLD AUTO: 46.1 % (ref 34–46.6)
HGB BLD-MCNC: 13.9 G/DL (ref 12–15.9)
HGB UR QL STRIP.AUTO: NEGATIVE
KETONES UR QL STRIP: NEGATIVE
LEFT ATRIUM VOLUME INDEX: 47.3 ML/M2
LEUKOCYTE ESTERASE UR QL STRIP.AUTO: ABNORMAL
LV EF 2D ECHO EST: 32 %
LYMPHOCYTES # BLD AUTO: 0.7 10*3/MM3 (ref 0.7–3.1)
LYMPHOCYTES NFR BLD AUTO: 9 % (ref 19.6–45.3)
MAXIMAL PREDICTED HEART RATE: 139 BPM
MCH RBC QN AUTO: 27 PG (ref 26.6–33)
MCHC RBC AUTO-ENTMCNC: 30 G/DL (ref 31.5–35.7)
MCV RBC AUTO: 90 FL (ref 79–97)
MONOCYTES # BLD AUTO: 0.5 10*3/MM3 (ref 0.1–0.9)
MONOCYTES NFR BLD AUTO: 6.3 % (ref 5–12)
NEUTROPHILS NFR BLD AUTO: 6.5 10*3/MM3 (ref 1.7–7)
NEUTROPHILS NFR BLD AUTO: 84.7 % (ref 42.7–76)
NITRITE UR QL STRIP: NEGATIVE
PH UR STRIP.AUTO: 6 [PH] (ref 5–8)
PLATELET # BLD AUTO: 330 10*3/MM3 (ref 140–450)
PMV BLD AUTO: 8.9 FL (ref 6–12)
POTASSIUM SERPL-SCNC: 3.6 MMOL/L (ref 3.5–5.2)
PROT SERPL-MCNC: 6.4 G/DL (ref 6–8.5)
PROT UR QL STRIP: NEGATIVE
RBC # BLD AUTO: 5.13 10*6/MM3 (ref 3.77–5.28)
SODIUM SERPL-SCNC: 143 MMOL/L (ref 136–145)
SP GR UR STRIP: 1.01 (ref 1–1.03)
STRESS TARGET HR: 118 BPM
T4 FREE SERPL-MCNC: 1.65 NG/DL (ref 0.93–1.7)
TSH SERPL DL<=0.05 MIU/L-ACNC: 7.36 UIU/ML (ref 0.27–4.2)
UROBILINOGEN UR QL STRIP: ABNORMAL
WBC NRBC COR # BLD: 7.7 10*3/MM3 (ref 3.4–10.8)

## 2022-06-02 PROCEDURE — 25010000002 BEVACIZUMAB-AWWB 400 MG/16ML SOLUTION 16 ML VIAL: Performed by: INTERNAL MEDICINE

## 2022-06-02 PROCEDURE — 93306 TTE W/DOPPLER COMPLETE: CPT

## 2022-06-02 PROCEDURE — 81003 URINALYSIS AUTO W/O SCOPE: CPT | Performed by: INTERNAL MEDICINE

## 2022-06-02 PROCEDURE — 93306 TTE W/DOPPLER COMPLETE: CPT | Performed by: INTERNAL MEDICINE

## 2022-06-02 PROCEDURE — 25010000002 BEVACIZUMAB-AWWB 100 MG/4ML SOLUTION 4 ML VIAL: Performed by: INTERNAL MEDICINE

## 2022-06-02 PROCEDURE — 82962 GLUCOSE BLOOD TEST: CPT

## 2022-06-02 PROCEDURE — 85025 COMPLETE CBC W/AUTO DIFF WBC: CPT | Performed by: INTERNAL MEDICINE

## 2022-06-02 PROCEDURE — 80053 COMPREHEN METABOLIC PANEL: CPT | Performed by: INTERNAL MEDICINE

## 2022-06-02 PROCEDURE — 25010000002 ATEZOLIZUMAB 1200 MG/20ML SOLUTION 20 ML VIAL: Performed by: INTERNAL MEDICINE

## 2022-06-02 PROCEDURE — 99214 OFFICE O/P EST MOD 30 MIN: CPT | Performed by: INTERNAL MEDICINE

## 2022-06-02 PROCEDURE — 96417 CHEMO IV INFUS EACH ADDL SEQ: CPT

## 2022-06-02 PROCEDURE — 84443 ASSAY THYROID STIM HORMONE: CPT | Performed by: INTERNAL MEDICINE

## 2022-06-02 PROCEDURE — 84439 ASSAY OF FREE THYROXINE: CPT | Performed by: INTERNAL MEDICINE

## 2022-06-02 PROCEDURE — 82105 ALPHA-FETOPROTEIN SERUM: CPT | Performed by: INTERNAL MEDICINE

## 2022-06-02 PROCEDURE — 96413 CHEMO IV INFUSION 1 HR: CPT

## 2022-06-02 RX ORDER — SODIUM CHLORIDE 9 MG/ML
250 INJECTION, SOLUTION INTRAVENOUS ONCE
Status: CANCELLED | OUTPATIENT
Start: 2022-06-23

## 2022-06-02 RX ORDER — SODIUM CHLORIDE 9 MG/ML
250 INJECTION, SOLUTION INTRAVENOUS ONCE
Status: COMPLETED | OUTPATIENT
Start: 2022-06-02 | End: 2022-06-02

## 2022-06-02 RX ADMIN — ATEZOLIZUMAB 1200 MG: 1200 INJECTION, SOLUTION INTRAVENOUS at 11:16

## 2022-06-02 RX ADMIN — BEVACIZUMAB-AWWB 700 MG: 400 INJECTION, SOLUTION INTRAVENOUS at 12:02

## 2022-06-02 RX ADMIN — SODIUM CHLORIDE 250 ML: 9 INJECTION, SOLUTION INTRAVENOUS at 11:15

## 2022-06-02 NOTE — PROGRESS NOTES
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  female presents today for follow-up of her hepatocellular carcinoma.  She is currently on Avastin and Tecentriq.  Tolerating it reasonably well.  She is having a lot of diarrhea that is related to C. difficile.  I wonder if she is having colitis related to her Tecentriq.  She has had findings suggestive of C. difficile.  Though she reports its not infective.  She was offered a drug that has about a $4500 a month co-pay.    Past medical history, social history and family history was reviewed 06/02/22 and unchanged from prior visit.    Review of Systems:    Review of Systems   Constitutional: Positive for appetite change, fatigue and unexpected weight change.   HENT:  Negative.    Eyes: Negative.    Respiratory: Positive for shortness of breath.    Cardiovascular: Negative.    Gastrointestinal: Positive for abdominal distention and diarrhea.   Endocrine: Negative.    Genitourinary: Negative.     Musculoskeletal: Negative.    Neurological: Negative.    Hematological: Negative.    Psychiatric/Behavioral: Negative.             Medications:        Current Outpatient Medications:   •  carvedilol (COREG) 6.25 MG tablet, Take 1 tablet by mouth 2 (Two) Times a Day., Disp: 180 tablet, Rfl: 3  •  dicyclomine (Bentyl) 10 MG capsule, Take 1 capsule by mouth 3 (Three) Times a Day With Meals., Disp: 90 capsule, Rfl: 2  •  fenofibrate (TRICOR) 145 MG tablet, Take 1 tablet by mouth Daily., Disp: 90 tablet, Rfl: 3  •  Flovent  MCG/ACT inhaler, INHALE 1 PUFF TWICE A DAY BY INHALATION ROUTE*RINSE MOUTH AFTER USE, Disp: , Rfl:   •  ondansetron (ZOFRAN) 8 MG  tablet, Take 1 tablet by mouth 3 (Three) Times a Day As Needed for Nausea or Vomiting., Disp: 30 tablet, Rfl: 5  •  torsemide (DEMADEX) 20 MG tablet, Take 1 tablet by mouth Daily., Disp: 90 tablet, Rfl: 3  •  HYDROcodone-acetaminophen (NORCO) 5-325 MG per tablet, 1-2 tabs every 6 hours as needed for abdominal pain., Disp: 60 tablet, Rfl: 0  •  methylphenidate (Ritalin) 10 MG tablet, Take 1 tablet by mouth 2 (Two) Times a Day., Disp: 60 tablet, Rfl: 0  •  spironolactone (ALDACTONE) 25 MG tablet, Take 0.5 tablets by mouth Daily., Disp: 45 tablet, Rfl: 3  No current facility-administered medications for this visit.    Pain Medications             HYDROcodone-acetaminophen (NORCO) 5-325 MG per tablet 1-2 tabs every 6 hours as needed for abdominal pain.             ALLERGIES:    Allergies   Allergen Reactions   • Metformin Diarrhea   • Statins Myalgia     Muscle pain         Physical Exam    VITAL SIGNS:  /58   Pulse 69   Resp 16   Wt 46.8 kg (103 lb 3.2 oz)   SpO2 98%   BMI 17.71 kg/m²                 Wt Readings from Last 3 Encounters:   06/02/22 46.8 kg (103 lb 3.2 oz)   06/02/22 46.7 kg (103 lb)   05/20/22 47.6 kg (105 lb)       Body mass index is 17.71 kg/m². Body surface area is 1.48 meters squared.    There were no vitals filed for this visit.        Performance Status: 1    General: cachetic appearing, in no acute distress  HEENT: sclera anicteric, neck is supple  Lymphatics: no cervical, supraclavicular, or axillary adenopathy  Extremities: no lower extremity edema  Skin: no rashes, lesions, bruising, or petechiae  Msk:  Shows no weakness of the large muscle groups  Psych: Mood is stable        RECENT LABS:    Lab Results   Component Value Date    HGB 13.9 06/02/2022    HCT 46.1 06/02/2022    MCV 90.0 06/02/2022     06/02/2022    WBC 7.70 06/02/2022    NEUTROABS 6.50 06/02/2022    LYMPHSABS 0.70 06/02/2022    MONOSABS 0.50 06/02/2022    EOSABS 0.17 09/03/2021    BASOSABS 0.06 09/03/2021        Lab Results   Component Value Date    GLUCOSE 112 (H) 06/02/2022    BUN 45 (H) 06/02/2022    CREATININE 1.31 (H) 06/02/2022     06/02/2022    K 3.6 06/02/2022     06/02/2022    CO2 30.0 (H) 06/02/2022    CALCIUM 9.8 06/02/2022    PROTEINTOT 6.4 06/02/2022    ALBUMIN 3.50 06/02/2022    BILITOT 0.7 06/02/2022    ALKPHOS 80 06/02/2022    AST 31 06/02/2022    ALT 14 06/02/2022     Lab Results   Component Value Date     (H) 05/12/2022    AFP 1,090 (H) 11/15/2021    AFP 1,722 (H) 10/25/2021    AFP 3,324 (H) 10/04/2021     Lab Results   Component Value Date    PROBNP 13,256.0 (H) 10/25/2021         CT Chest With Contrast Diagnostic    Result Date: 9/24/2021  1. Areas of opacification midlungs bilateral right greater than left most consistent with postinfectious or postinflammatory etiology although intermixed micronodular appearance not entirely excluding pulmonary involvement and metastasis with potential follow-up. No dominant nodule or mass however  2. Omental caking and mesenteric nodularity/reticulation consistent with peritoneal disease process potentially metastatic with no definitive evidence for cirrhotic hepatic morphology evident only a flash fill benign hemangioma the right hepatic lobe without separate area of hepatic cell carcinoma or washout characteristics is identified. Perihepatic ascites. Partially visualized GI tract unremarkable  DICTATED:   09/24/2021 EDITED/ls :   09/24/2021  This report was finalized on 9/24/2021 5:45 PM by Dr. Chava Hall.      CT Abdomen With & Without Contrast    Result Date: 9/24/2021  1. Areas of opacification midlungs bilateral right greater than left most consistent with postinfectious or postinflammatory etiology although intermixed micronodular appearance not entirely excluding pulmonary involvement and metastasis with potential follow-up. No dominant nodule or mass however  2. Omental caking and mesenteric nodularity/reticulation consistent with  peritoneal disease process potentially metastatic with no definitive evidence for cirrhotic hepatic morphology evident only a flash fill benign hemangioma the right hepatic lobe without separate area of hepatic cell carcinoma or washout characteristics is identified. Perihepatic ascites. Partially visualized GI tract unremarkable  DICTATED:   09/24/2021 EDITED/ls :   09/24/2021  This report was finalized on 9/24/2021 5:45 PM by Dr. Chava Hall.      Duplex Renal Artery - Bilateral Complete CAR    Result Date: 9/20/2021  Parenchymal blood flow pattern seen within both kidneys. No evidence of renal artery stenosis.  D:  09/17/2021 E:  09/17/2021  This report was finalized on 9/20/2021 9:32 AM by Dr. Abeba Goyal MD.        Clinical Information    Omental mass.   Final Diagnosis   1.  OMENTAL BIOPSY:  Involved by malignant neoplasm with evidence of hepatocellular differentiation (see comment).    2.  PERITONEAL NODULE:  Involved by malignant neoplasm with evidence of hepatocellular differentiation (see comment).   Electronically signed by Sean Street MD on 9/20/2021 at 1050   Comment    Both the omentum and the peritoneal nodule show a similar appearance with a neoplasm consisting of polygonal cells with slightly granular clear cytoplasm and mild nuclear pleomorphism.  No definitive gland formation is noted.  No evidence of squamous differentiation is identified.  Numerous immunohistochemical stains were undertaken.  Tumor is negative for CD45, CD30, CK 5/6, CD68, CD45, PLAP desmin, PAX 8, vimentin, TTF-1, S100, WT1, p16, melanin, GATA3, CK7, CK20, , CD10, CAIX, and calretinin.  There is positivity for CK 8 in approximately 66% of the neoplastic cells.  Cytokeratin SAIRA also shows positivity in a significant percentage of the neoplastic cells.  P53 shows nuclear positivity which is weak and involves approximately 25% of cells.  There is strong diffuse positivity for Heppar-1 and arginase.  This  staining pattern supports evidence of hepatic differentiation in this neoplasm.  Hepatocellular carcinoma would be the most likely diagnosis.  The possibility of a hepatoid carcinoma from another site cannot be entirely excluded although this is considered to be less likely.  Adequate tissue is present for molecular diagnostic studies if required.    This case was discussed with Dr. Jaimes by Dr. Street on 9/20/2021.           Assessment/Plan    1.  Metastatic hepatocellular carcinoma.  Proceed with Tecentriq and Avastin.  It is unclear if she has significant progression at this time.  I am probably going to repeat scans towards the end of July.  Hopefully she continues to do well.  My next regimen would be lenvatinib.  My biggest concern is the diarrhea which may be related to colitis.  I may consider stopping her Tecentriq if she continues to have diarrhea in the next 3 weeks or if it worsens.    2.  Congestive heart failure symptoms.  Seems relatively stable.    3.  Hypothyroidism.  Patient currently on Synthroid.  We will check her thyroid function today.    4.  Chronic C. difficile infection.  Seems to have cleared it for now.  We will need to see infectious disease since her symptoms are starting to resume again.  Unclear if this is IBD or C. Difficile.  With Tecentriq he can also have colitis playing a role here.      Primitivo Pollock MD  [facility]    Return on: 06/23/22  Return in (Approximately): 3 weeks, Schedule with next infusion    Orders Placed This Encounter   Procedures   • AFP Tumor Marker   • Comprehensive metabolic panel   • Urinalysis without microscopic (no culture) - Urine, Clean Catch   • CBC and Differential       6/2/2022

## 2022-06-03 ENCOUNTER — TELEPHONE (OUTPATIENT)
Dept: CARDIOLOGY | Facility: CLINIC | Age: 82
End: 2022-06-03

## 2022-06-03 DIAGNOSIS — I10 ESSENTIAL HYPERTENSION: Primary | ICD-10-CM

## 2022-06-03 NOTE — TELEPHONE ENCOUNTER
Called pt regarding ECHO results.     Per KTS- add Jardiance 10 mg daily, BMP and Magnesium level one week after starting Jardiance.     Confirmed with pharmacist at [facility] that patient can take Jardiance with cancer therapy (Tecentriq and Avastin).    Discussed KTS recommendations above. Pt verbalizes understanding and agreeable to plan.    Patient will  samples of Jardiance 10 mg in [place] office on 6/9/22 when Dr. Saucedo is in [place] and call and give fax number to have lab orders faxed to.

## 2022-06-07 ENCOUNTER — TELEPHONE (OUTPATIENT)
Dept: CARDIOLOGY | Facility: CLINIC | Age: 82
End: 2022-06-07

## 2022-06-07 NOTE — TELEPHONE ENCOUNTER
----- Message from Desiree Forte sent at 6/7/2022  8:04 AM EDT -----  Regarding: New meds  Carmen Weeks,  Can you tell me if there is other treatment beyond the medicine that is warranted in my case?  If it were not for my cancer, would doctor Lewis recommend a procedure? If so, what specifically is prohibiting that treatment? He had initially mentioned  a heart cath. Thank you.     ____________________________________________    Please advise.

## 2022-06-07 NOTE — TELEPHONE ENCOUNTER
Noted; her metastatic hepatocellular carcinoma precludes consideration of cardiac catheterization studies as well as AICD at this point.  Would initiate SGLT2 inhibitor drug therapy as recommended post her recent echocardiogram.    Thanks!

## 2022-06-07 NOTE — TELEPHONE ENCOUNTER
Patient sent a separate message that stated:    From   Desiree Forte To   [facility] Card [facility] Clinical Pool Sent   6/7/2022  9:26 AM         Good morning Carmen Weeks, my girls believe we need to discuss the Jardiance more.  I think you are aware that I have been battling CDIFF for many months.  Just yesterday I was put on Deficid which is supposed to cure me.  It is the most expensive medicine I’ve ever been prescribed…$5,400 for 20 pills!  My body needs to become adjusted to this med before ingesting another that has many side effects comparable to what I’m taking Deficid for.  Please discuss with Dr Saucedo and advise me.  There were no infectious disease doctors available in the [place] area.  I saw Dr Nolan in [place]. I doubt that he is aware of Dr Nolan’s findings!  Sorry to cause you more trouble but believe this is the correct path for now.       Please advise.

## 2022-06-23 ENCOUNTER — APPOINTMENT (OUTPATIENT)
Dept: ONCOLOGY | Facility: HOSPITAL | Age: 82
End: 2022-06-23

## 2022-06-23 ENCOUNTER — OFFICE VISIT (OUTPATIENT)
Dept: ONCOLOGY | Facility: CLINIC | Age: 82
End: 2022-06-23

## 2022-06-23 ENCOUNTER — HOSPITAL ENCOUNTER (OUTPATIENT)
Dept: ONCOLOGY | Facility: HOSPITAL | Age: 82
Setting detail: INFUSION SERIES
Discharge: HOME | End: 2022-06-23

## 2022-06-23 VITALS
DIASTOLIC BLOOD PRESSURE: 55 MMHG | HEIGHT: 64 IN | SYSTOLIC BLOOD PRESSURE: 97 MMHG | TEMPERATURE: 96.9 F | OXYGEN SATURATION: 91 % | RESPIRATION RATE: 18 BRPM | WEIGHT: 101 LBS | BODY MASS INDEX: 17.24 KG/M2 | HEART RATE: 40 BPM

## 2022-06-23 DIAGNOSIS — C22.0 HEPATOCELLULAR CARCINOMA METASTATIC TO PERITONEUM: Primary | ICD-10-CM

## 2022-06-23 DIAGNOSIS — C78.6 HEPATOCELLULAR CARCINOMA METASTATIC TO PERITONEUM: Primary | ICD-10-CM

## 2022-06-23 LAB
ALBUMIN SERPL-MCNC: 3.6 G/DL (ref 3.5–5.2)
ALBUMIN/GLOB SERPL: 1.2 G/DL
ALP SERPL-CCNC: 84 U/L (ref 39–117)
ALT SERPL W P-5'-P-CCNC: 12 U/L (ref 1–33)
ANION GAP SERPL CALCULATED.3IONS-SCNC: 6 MMOL/L (ref 5–15)
AST SERPL-CCNC: 27 U/L (ref 1–32)
BILIRUB SERPL-MCNC: 1.1 MG/DL (ref 0–1.2)
BILIRUB UR QL STRIP: NEGATIVE
BUN SERPL-MCNC: 31 MG/DL (ref 8–23)
BUN/CREAT SERPL: 23 (ref 7–25)
CALCIUM SPEC-SCNC: 10 MG/DL (ref 8.6–10.5)
CHLORIDE SERPL-SCNC: 100 MMOL/L (ref 98–107)
CLARITY UR: ABNORMAL
CO2 SERPL-SCNC: 34 MMOL/L (ref 22–29)
COLOR UR: YELLOW
CREAT SERPL-MCNC: 1.35 MG/DL (ref 0.57–1)
EGFRCR SERPLBLD CKD-EPI 2021: 39.3 ML/MIN/1.73
ERYTHROCYTE [DISTWIDTH] IN BLOOD BY AUTOMATED COUNT: 18.4 % (ref 12.3–15.4)
GLOBULIN UR ELPH-MCNC: 3 GM/DL
GLUCOSE SERPL-MCNC: 113 MG/DL (ref 65–99)
GLUCOSE UR STRIP-MCNC: NEGATIVE MG/DL
HCT VFR BLD AUTO: 41.4 % (ref 34–46.6)
HGB BLD-MCNC: 12.4 G/DL (ref 12–15.9)
HGB UR QL STRIP.AUTO: NEGATIVE
KETONES UR QL STRIP: NEGATIVE
LEUKOCYTE ESTERASE UR QL STRIP.AUTO: ABNORMAL
LYMPHOCYTES # BLD AUTO: 0.9 10*3/MM3 (ref 0.7–3.1)
LYMPHOCYTES NFR BLD AUTO: 11.6 % (ref 19.6–45.3)
MCH RBC QN AUTO: 27.1 PG (ref 26.6–33)
MCHC RBC AUTO-ENTMCNC: 30 G/DL (ref 31.5–35.7)
MCV RBC AUTO: 90.1 FL (ref 79–97)
MONOCYTES # BLD AUTO: 0.3 10*3/MM3 (ref 0.1–0.9)
MONOCYTES NFR BLD AUTO: 4 % (ref 5–12)
NEUTROPHILS NFR BLD AUTO: 6.3 10*3/MM3 (ref 1.7–7)
NEUTROPHILS NFR BLD AUTO: 84.4 % (ref 42.7–76)
NITRITE UR QL STRIP: NEGATIVE
PH UR STRIP.AUTO: 6 [PH] (ref 5–8)
PLATELET # BLD AUTO: 310 10*3/MM3 (ref 140–450)
PMV BLD AUTO: 9.1 FL (ref 6–12)
POTASSIUM SERPL-SCNC: 3.7 MMOL/L (ref 3.5–5.2)
PROT SERPL-MCNC: 6.6 G/DL (ref 6–8.5)
PROT UR QL STRIP: ABNORMAL
RBC # BLD AUTO: 4.59 10*6/MM3 (ref 3.77–5.28)
SODIUM SERPL-SCNC: 140 MMOL/L (ref 136–145)
SP GR UR STRIP: 1.02 (ref 1–1.03)
UROBILINOGEN UR QL STRIP: ABNORMAL
WBC NRBC COR # BLD: 7.5 10*3/MM3 (ref 3.4–10.8)

## 2022-06-23 PROCEDURE — 96417 CHEMO IV INFUS EACH ADDL SEQ: CPT

## 2022-06-23 PROCEDURE — 25010000002 ATEZOLIZUMAB 1200 MG/20ML SOLUTION 20 ML VIAL: Performed by: INTERNAL MEDICINE

## 2022-06-23 PROCEDURE — 36415 COLL VENOUS BLD VENIPUNCTURE: CPT

## 2022-06-23 PROCEDURE — 96413 CHEMO IV INFUSION 1 HR: CPT

## 2022-06-23 PROCEDURE — 25010000002 BEVACIZUMAB-AWWB 400 MG/16ML SOLUTION 16 ML VIAL: Performed by: INTERNAL MEDICINE

## 2022-06-23 PROCEDURE — 99214 OFFICE O/P EST MOD 30 MIN: CPT | Performed by: INTERNAL MEDICINE

## 2022-06-23 PROCEDURE — 81003 URINALYSIS AUTO W/O SCOPE: CPT | Performed by: INTERNAL MEDICINE

## 2022-06-23 PROCEDURE — 85025 COMPLETE CBC W/AUTO DIFF WBC: CPT | Performed by: INTERNAL MEDICINE

## 2022-06-23 PROCEDURE — 80053 COMPREHEN METABOLIC PANEL: CPT | Performed by: INTERNAL MEDICINE

## 2022-06-23 RX ORDER — SODIUM CHLORIDE 9 MG/ML
250 INJECTION, SOLUTION INTRAVENOUS ONCE
Status: CANCELLED | OUTPATIENT
Start: 2022-08-04

## 2022-06-23 RX ORDER — SODIUM CHLORIDE 9 MG/ML
250 INJECTION, SOLUTION INTRAVENOUS ONCE
Status: COMPLETED | OUTPATIENT
Start: 2022-06-23 | End: 2022-06-23

## 2022-06-23 RX ORDER — LOSARTAN POTASSIUM 25 MG/1
TABLET ORAL
COMMUNITY
Start: 2022-06-17 | End: 2022-06-23

## 2022-06-23 RX ORDER — SODIUM CHLORIDE 9 MG/ML
250 INJECTION, SOLUTION INTRAVENOUS ONCE
Status: CANCELLED | OUTPATIENT
Start: 2022-07-14

## 2022-06-23 RX ADMIN — SODIUM CHLORIDE 250 ML: 9 INJECTION, SOLUTION INTRAVENOUS at 12:22

## 2022-06-23 RX ADMIN — BEVACIZUMAB-AWWB 700 MG: 400 INJECTION, SOLUTION INTRAVENOUS at 12:58

## 2022-06-23 RX ADMIN — ATEZOLIZUMAB 1200 MG: 1200 INJECTION, SOLUTION INTRAVENOUS at 12:22

## 2022-06-23 NOTE — PROGRESS NOTES
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  female presents today for follow-up of her hepatocellular carcinoma.  She is currently on Avastin and Tecentriq.  She is having a lot of issues with sore throat.  Its causing a lot of discomfort for her.  She is still profoundly weak.  She is finally having solid stools.  She had an infusion for her C. difficile colitis.  They are planning for possible fecal transplant soon.      Past medical history, social history and family history was reviewed 06/23/22 and unchanged from prior visit.    Review of Systems:    Review of Systems   Constitutional: Positive for appetite change, fatigue and unexpected weight change.   HENT:   Positive for sore throat.    Eyes: Negative.    Respiratory: Positive for shortness of breath.    Cardiovascular: Negative.    Gastrointestinal: Positive for abdominal distention and diarrhea.   Endocrine: Negative.    Genitourinary: Negative.     Musculoskeletal: Negative.    Neurological: Negative.    Hematological: Negative.    Psychiatric/Behavioral: Negative.             Medications:        Current Outpatient Medications:   •  carvedilol (COREG) 6.25 MG tablet, Take 1 tablet by mouth 2 (Two) Times a Day., Disp: 180 tablet, Rfl: 3  •  dicyclomine (Bentyl) 10 MG capsule, Take 1 capsule by mouth 3 (Three) Times a Day With Meals., Disp: 90 capsule, Rfl: 2  •  fenofibrate (TRICOR) 145 MG tablet, Take 1 tablet by mouth Daily., Disp: 90 tablet, Rfl: 3  •  Flovent  MCG/ACT inhaler, INHALE 1 PUFF TWICE A DAY BY INHALATION ROUTE*RINSE MOUTH AFTER USE, Disp: , Rfl:   •  HYDROcodone-acetaminophen (NORCO) 5-325 MG per tablet,  "1-2 tabs every 6 hours as needed for abdominal pain., Disp: 60 tablet, Rfl: 0  •  ondansetron (ZOFRAN) 8 MG tablet, Take 1 tablet by mouth 3 (Three) Times a Day As Needed for Nausea or Vomiting., Disp: 30 tablet, Rfl: 5  •  spironolactone (ALDACTONE) 25 MG tablet, Take 0.5 tablets by mouth Daily., Disp: 45 tablet, Rfl: 3  •  torsemide (DEMADEX) 20 MG tablet, Take 1 tablet by mouth Daily., Disp: 90 tablet, Rfl: 3  •  losartan (COZAAR) 25 MG tablet, , Disp: , Rfl:   •  methylphenidate (Ritalin) 10 MG tablet, Take 1 tablet by mouth 2 (Two) Times a Day., Disp: 60 tablet, Rfl: 0  No current facility-administered medications for this visit.    Facility-Administered Medications Ordered in Other Visits:   •  Atezolizumab (TECENTRIQ) 1,200 mg in sodium chloride 0.9 % 295 mL chemo IVPB, 1,200 mg, Intravenous, Once, Primitivo Pollock MD, Last Rate: 600 mL/hr at 06/23/22 1222, 1,200 mg at 06/23/22 1222  •  Bevacizumab-awwb (MVASI) 700 mg in sodium chloride 0.9 % 100 mL chemo IVPB, 700 mg, Intravenous, Once, Primitivo Pollock MD    Pain Medications             HYDROcodone-acetaminophen (NORCO) 5-325 MG per tablet 1-2 tabs every 6 hours as needed for abdominal pain.             ALLERGIES:    Allergies   Allergen Reactions   • Metformin Diarrhea   • Statins Myalgia     Muscle pain         Physical Exam    VITAL SIGNS:  BP 97/55 Comment: RUE  Pulse (!) 40   Temp 96.9 °F (36.1 °C) (Infrared)   Resp 18   Ht 162.6 cm (64\")   Wt 45.8 kg (101 lb)   SpO2 91% Comment: RA  BMI 17.34 kg/m²     ECOG score: 0           Wt Readings from Last 3 Encounters:   06/23/22 45.8 kg (101 lb)   06/02/22 46.7 kg (103 lb)   06/02/22 46.8 kg (103 lb 3.2 oz)       Body mass index is 17.34 kg/m². Body surface area is 1.46 meters squared.    Pain Score    06/23/22 1035   PainSc:   7   PainLoc: Throat           Performance Status: 1    General: cachetic appearing, in no acute distress  HEENT: sclera anicteric, neck is supple  Lymphatics: no " cervical, supraclavicular, or axillary adenopathy  Extremities: no lower extremity edema  Skin: no rashes, lesions, bruising, or petechiae  Msk:  Shows no weakness of the large muscle groups  Psych: Mood is stable        RECENT LABS:    Lab Results   Component Value Date    HGB 12.4 06/23/2022    HCT 41.4 06/23/2022    MCV 90.1 06/23/2022     06/23/2022    WBC 7.50 06/23/2022    NEUTROABS 6.30 06/23/2022    LYMPHSABS 0.90 06/23/2022    MONOSABS 0.30 06/23/2022    EOSABS 0.17 09/03/2021    BASOSABS 0.06 09/03/2021       Lab Results   Component Value Date    GLUCOSE 113 (H) 06/23/2022    BUN 31 (H) 06/23/2022    CREATININE 1.35 (H) 06/23/2022     06/23/2022    K 3.7 06/23/2022     06/23/2022    CO2 34.0 (H) 06/23/2022    CALCIUM 10.0 06/23/2022    PROTEINTOT 6.6 06/23/2022    ALBUMIN 3.60 06/23/2022    BILITOT 1.1 06/23/2022    ALKPHOS 84 06/23/2022    AST 27 06/23/2022    ALT 12 06/23/2022     Lab Results   Component Value Date     (H) 06/02/2022     (H) 06/02/2022     (H) 05/12/2022    AFP 1,090 (H) 11/15/2021    AFP 1,722 (H) 10/25/2021     Lab Results   Component Value Date    PROBNP 13,256.0 (H) 10/25/2021             Clinical Information    Omental mass.   Final Diagnosis   1.  OMENTAL BIOPSY:  Involved by malignant neoplasm with evidence of hepatocellular differentiation (see comment).    2.  PERITONEAL NODULE:  Involved by malignant neoplasm with evidence of hepatocellular differentiation (see comment).   Electronically signed by Sean Street MD on 9/20/2021 at 1050   Comment    Both the omentum and the peritoneal nodule show a similar appearance with a neoplasm consisting of polygonal cells with slightly granular clear cytoplasm and mild nuclear pleomorphism.  No definitive gland formation is noted.  No evidence of squamous differentiation is identified.  Numerous immunohistochemical stains were undertaken.  Tumor is negative for CD45, CD30, CK 5/6, CD68, CD45,  PLAP desmin, PAX 8, vimentin, TTF-1, S100, WT1, p16, melanin, GATA3, CK7, CK20, , CD10, CAIX, and calretinin.  There is positivity for CK 8 in approximately 66% of the neoplastic cells.  Cytokeratin SAIRA also shows positivity in a significant percentage of the neoplastic cells.  P53 shows nuclear positivity which is weak and involves approximately 25% of cells.  There is strong diffuse positivity for Heppar-1 and arginase.  This staining pattern supports evidence of hepatic differentiation in this neoplasm.  Hepatocellular carcinoma would be the most likely diagnosis.  The possibility of a hepatoid carcinoma from another site cannot be entirely excluded although this is considered to be less likely.  Adequate tissue is present for molecular diagnostic studies if required.    This case was discussed with Dr. Jaimes by Dr. Street on 9/20/2021.           Assessment/Plan    1.  Metastatic hepatocellular carcinoma.  Proceed with Tecentriq and Avastin.  Repeat scans in 3 weeks.  She is becoming increasingly symptomatic and concerned that she has progressive disease that is occurring.    2.  Congestive heart failure symptoms.  Seems relatively stable.    3.  Hypothyroidism.  Patient currently on Synthroid.  We will check her thyroid function today.    4.  Chronic C. difficile infection.  Seems to have cleared it for now.  We will need to see infectious disease since her symptoms are starting to resume again.  Unclear if this is IBD or C. Difficile.  With Tecentriq he can also have colitis playing a role here.    5.  Sore throat.  This could be thrush that is causing some issues.  I am going to treat her with Diflucan.  Also give her short course of steroids to see if it helps.      Primitivo Pollock MD  [facility]emat[facility] On[facility]    Return on: 07/14/22  Return in (Approximately): Schedule with next infusion, 3 weeks    Orders Placed This Encounter   Procedures   • AFP Tumor Marker   • Comprehensive  metabolic panel   • TSH   • T4, free   • Urinalysis without microscopic (no culture) - Urine, Clean Catch   • AFP Tumor Marker   • Comprehensive metabolic panel   • Urinalysis without microscopic (no culture) - Urine, Clean Catch   • CBC and Differential   • CBC and Differential       6/23/2022

## 2022-07-08 ENCOUNTER — TRANSCRIBE ORDERS (OUTPATIENT)
Dept: ADMINISTRATIVE | Facility: HOSPITAL | Age: 82
End: 2022-07-08

## 2022-07-08 DIAGNOSIS — R05.3 CHRONIC COUGH: Primary | ICD-10-CM

## 2022-07-11 ENCOUNTER — HOSPITAL ENCOUNTER (OUTPATIENT)
Dept: CT IMAGING | Facility: HOSPITAL | Age: 82
Discharge: HOME | End: 2022-07-11
Admitting: INTERNAL MEDICINE

## 2022-07-11 DIAGNOSIS — C78.6 HEPATOCELLULAR CARCINOMA METASTATIC TO PERITONEUM: ICD-10-CM

## 2022-07-11 DIAGNOSIS — C22.0 HEPATOCELLULAR CARCINOMA METASTATIC TO PERITONEUM: ICD-10-CM

## 2022-07-11 DIAGNOSIS — R05.3 CHRONIC COUGH: ICD-10-CM

## 2022-07-11 PROCEDURE — 74177 CT ABD & PELVIS W/CONTRAST: CPT

## 2022-07-11 PROCEDURE — 25010000002 IOPAMIDOL 61 % SOLUTION: Performed by: INTERNAL MEDICINE

## 2022-07-11 PROCEDURE — 71260 CT THORAX DX C+: CPT

## 2022-07-11 RX ADMIN — IOPAMIDOL 100 ML: 612 INJECTION, SOLUTION INTRAVENOUS at 14:17

## 2022-07-12 ENCOUNTER — TELEPHONE (OUTPATIENT)
Dept: ONCOLOGY | Facility: CLINIC | Age: 82
End: 2022-07-12

## 2022-07-12 NOTE — TELEPHONE ENCOUNTER
Called patient informing her that Dr. Pickens had nurse send in Eliquis for her. Informing her that the scans showed blood clot at this time. Informing her that the right lung also had pleural effusion which meant she had fluid in her lung. Informing patient also that she can pick this up. Discussing with her symptoms to be aware. Such as increased SOB, coughing up blood, and decreased sats below 90% that don't go back to normal values.

## 2022-07-13 ENCOUNTER — HOSPITAL ENCOUNTER (INPATIENT)
Facility: HOSPITAL | Age: 82
LOS: 4 days | Discharge: HOME | End: 2022-07-17
Attending: EMERGENCY MEDICINE | Admitting: FAMILY MEDICINE

## 2022-07-13 ENCOUNTER — APPOINTMENT (OUTPATIENT)
Dept: CARDIOLOGY | Facility: HOSPITAL | Age: 82
End: 2022-07-13

## 2022-07-13 ENCOUNTER — APPOINTMENT (OUTPATIENT)
Dept: GENERAL RADIOLOGY | Facility: HOSPITAL | Age: 82
End: 2022-07-13

## 2022-07-13 DIAGNOSIS — C78.6 HEPATOCELLULAR CARCINOMA METASTATIC TO PERITONEUM: ICD-10-CM

## 2022-07-13 DIAGNOSIS — I26.99 BILATERAL PULMONARY EMBOLISM: ICD-10-CM

## 2022-07-13 DIAGNOSIS — J18.9 PNEUMONIA OF RIGHT LOWER LOBE DUE TO INFECTIOUS ORGANISM: ICD-10-CM

## 2022-07-13 DIAGNOSIS — R04.2 HEMOPTYSIS: Primary | ICD-10-CM

## 2022-07-13 DIAGNOSIS — C22.0 HEPATOCELLULAR CARCINOMA METASTATIC TO PERITONEUM: ICD-10-CM

## 2022-07-13 DIAGNOSIS — C22.0 CANCER, HEPATOCELLULAR: ICD-10-CM

## 2022-07-13 PROBLEM — N18.32 STAGE 3B CHRONIC KIDNEY DISEASE: Status: ACTIVE | Noted: 2022-07-13

## 2022-07-13 LAB
ALBUMIN SERPL-MCNC: 3.3 G/DL (ref 3.5–5.2)
ALBUMIN/GLOB SERPL: 1 G/DL
ALP SERPL-CCNC: 89 U/L (ref 39–117)
ALT SERPL W P-5'-P-CCNC: 10 U/L (ref 1–33)
ANION GAP SERPL CALCULATED.3IONS-SCNC: 9 MMOL/L (ref 5–15)
ANISOCYTOSIS BLD QL: NORMAL
APTT PPP: 38.7 SECONDS (ref 60–90)
AST SERPL-CCNC: 27 U/L (ref 1–32)
BASOPHILS # BLD AUTO: 0.06 10*3/MM3 (ref 0–0.2)
BASOPHILS NFR BLD AUTO: 0.6 % (ref 0–1.5)
BH CV LOW VAS LEFT COMMON FEMORAL SPONT: 1
BH CV LOW VAS LEFT PERONEAL VESSEL: 1
BH CV LOW VAS LEFT PROXIMAL FEMORAL SPONT: 1
BH CV LOW VAS LEFT SOLEAL VESSEL: 1
BH CV LOW VAS RIGHT PERONEAL VESSEL: 1
BH CV LOWER VASCULAR LEFT COMMON FEMORAL AUGMENT: NORMAL
BH CV LOWER VASCULAR LEFT COMMON FEMORAL COMPRESS: NORMAL
BH CV LOWER VASCULAR LEFT COMMON FEMORAL PHASIC: NORMAL
BH CV LOWER VASCULAR LEFT COMMON FEMORAL SPONT: NORMAL
BH CV LOWER VASCULAR LEFT DISTAL FEMORAL AUGMENT: NORMAL
BH CV LOWER VASCULAR LEFT DISTAL FEMORAL COMPRESS: NORMAL
BH CV LOWER VASCULAR LEFT DISTAL FEMORAL PHASIC: NORMAL
BH CV LOWER VASCULAR LEFT DISTAL FEMORAL SPONT: NORMAL
BH CV LOWER VASCULAR LEFT GREATER SAPH AK COMPRESS: NORMAL
BH CV LOWER VASCULAR LEFT GREATER SAPH BK COMPRESS: NORMAL
BH CV LOWER VASCULAR LEFT LESSER SAPH COMPRESS: NORMAL
BH CV LOWER VASCULAR LEFT MID FEMORAL AUGMENT: NORMAL
BH CV LOWER VASCULAR LEFT MID FEMORAL COMPRESS: NORMAL
BH CV LOWER VASCULAR LEFT MID FEMORAL PHASIC: NORMAL
BH CV LOWER VASCULAR LEFT MID FEMORAL SPONT: NORMAL
BH CV LOWER VASCULAR LEFT PERONEAL AUGMENT: NORMAL
BH CV LOWER VASCULAR LEFT PERONEAL COMPRESS: NORMAL
BH CV LOWER VASCULAR LEFT PERONEAL THROMBUS: NORMAL
BH CV LOWER VASCULAR LEFT POPLITEAL AUGMENT: NORMAL
BH CV LOWER VASCULAR LEFT POPLITEAL COMPRESS: NORMAL
BH CV LOWER VASCULAR LEFT POPLITEAL PHASIC: NORMAL
BH CV LOWER VASCULAR LEFT POPLITEAL SPONT: NORMAL
BH CV LOWER VASCULAR LEFT POSTERIOR TIBIAL AUGMENT: NORMAL
BH CV LOWER VASCULAR LEFT POSTERIOR TIBIAL COMPRESS: NORMAL
BH CV LOWER VASCULAR LEFT PROFUNDA FEMORAL AUGMENT: NORMAL
BH CV LOWER VASCULAR LEFT PROFUNDA FEMORAL PHASIC: NORMAL
BH CV LOWER VASCULAR LEFT PROFUNDA FEMORAL SPONT: NORMAL
BH CV LOWER VASCULAR LEFT PROXIMAL FEMORAL AUGMENT: NORMAL
BH CV LOWER VASCULAR LEFT PROXIMAL FEMORAL COMPRESS: NORMAL
BH CV LOWER VASCULAR LEFT PROXIMAL FEMORAL PHASIC: NORMAL
BH CV LOWER VASCULAR LEFT PROXIMAL FEMORAL SPONT: NORMAL
BH CV LOWER VASCULAR LEFT SAPHENOFEMORAL JUNCTION AUGMENT: NORMAL
BH CV LOWER VASCULAR LEFT SAPHENOFEMORAL JUNCTION COMPRESS: NORMAL
BH CV LOWER VASCULAR LEFT SAPHENOFEMORAL JUNCTION PHASIC: NORMAL
BH CV LOWER VASCULAR LEFT SAPHENOFEMORAL JUNCTION SPONT: NORMAL
BH CV LOWER VASCULAR LEFT SOLEAL COMPRESS: NORMAL
BH CV LOWER VASCULAR LEFT SOLEAL THROMBUS: NORMAL
BH CV LOWER VASCULAR RIGHT COMMON FEMORAL AUGMENT: NORMAL
BH CV LOWER VASCULAR RIGHT COMMON FEMORAL COMPRESS: NORMAL
BH CV LOWER VASCULAR RIGHT COMMON FEMORAL PHASIC: NORMAL
BH CV LOWER VASCULAR RIGHT COMMON FEMORAL SPONT: NORMAL
BH CV LOWER VASCULAR RIGHT DISTAL FEMORAL AUGMENT: NORMAL
BH CV LOWER VASCULAR RIGHT DISTAL FEMORAL COMPRESS: NORMAL
BH CV LOWER VASCULAR RIGHT DISTAL FEMORAL PHASIC: NORMAL
BH CV LOWER VASCULAR RIGHT DISTAL FEMORAL SPONT: NORMAL
BH CV LOWER VASCULAR RIGHT GASTRONEMIUS COMPRESS: NORMAL
BH CV LOWER VASCULAR RIGHT GREATER SAPH AK COMPRESS: NORMAL
BH CV LOWER VASCULAR RIGHT GREATER SAPH BK COMPRESS: NORMAL
BH CV LOWER VASCULAR RIGHT LESSER SAPH COMPRESS: NORMAL
BH CV LOWER VASCULAR RIGHT MID FEMORAL AUGMENT: NORMAL
BH CV LOWER VASCULAR RIGHT MID FEMORAL COMPRESS: NORMAL
BH CV LOWER VASCULAR RIGHT MID FEMORAL PHASIC: NORMAL
BH CV LOWER VASCULAR RIGHT MID FEMORAL SPONT: NORMAL
BH CV LOWER VASCULAR RIGHT PERONEAL AUGMENT: NORMAL
BH CV LOWER VASCULAR RIGHT PERONEAL COMPRESS: NORMAL
BH CV LOWER VASCULAR RIGHT PERONEAL THROMBUS: NORMAL
BH CV LOWER VASCULAR RIGHT POPLITEAL AUGMENT: NORMAL
BH CV LOWER VASCULAR RIGHT POPLITEAL COMPRESS: NORMAL
BH CV LOWER VASCULAR RIGHT POPLITEAL PHASIC: NORMAL
BH CV LOWER VASCULAR RIGHT POPLITEAL SPONT: NORMAL
BH CV LOWER VASCULAR RIGHT POSTERIOR TIBIAL COMPRESS: NORMAL
BH CV LOWER VASCULAR RIGHT PROFUNDA FEMORAL AUGMENT: NORMAL
BH CV LOWER VASCULAR RIGHT PROFUNDA FEMORAL PHASIC: NORMAL
BH CV LOWER VASCULAR RIGHT PROFUNDA FEMORAL SPONT: NORMAL
BH CV LOWER VASCULAR RIGHT PROXIMAL FEMORAL AUGMENT: NORMAL
BH CV LOWER VASCULAR RIGHT PROXIMAL FEMORAL COMPRESS: NORMAL
BH CV LOWER VASCULAR RIGHT PROXIMAL FEMORAL PHASIC: NORMAL
BH CV LOWER VASCULAR RIGHT PROXIMAL FEMORAL SPONT: NORMAL
BH CV LOWER VASCULAR RIGHT SAPHENOFEMORAL JUNCTION AUGMENT: NORMAL
BH CV LOWER VASCULAR RIGHT SAPHENOFEMORAL JUNCTION COMPRESS: NORMAL
BH CV LOWER VASCULAR RIGHT SAPHENOFEMORAL JUNCTION PHASIC: NORMAL
BH CV LOWER VASCULAR RIGHT SAPHENOFEMORAL JUNCTION SPONT: NORMAL
BILIRUB SERPL-MCNC: 1.5 MG/DL (ref 0–1.2)
BUN SERPL-MCNC: 34 MG/DL (ref 8–23)
BUN/CREAT SERPL: 25.6 (ref 7–25)
CALCIUM SPEC-SCNC: 10.1 MG/DL (ref 8.6–10.5)
CHLORIDE SERPL-SCNC: 101 MMOL/L (ref 98–107)
CO2 SERPL-SCNC: 33 MMOL/L (ref 22–29)
CREAT SERPL-MCNC: 1.33 MG/DL (ref 0.57–1)
D-LACTATE SERPL-SCNC: 1.2 MMOL/L (ref 0.5–2)
DEPRECATED RDW RBC AUTO: 62.9 FL (ref 37–54)
EGFRCR SERPLBLD CKD-EPI 2021: 40 ML/MIN/1.73
EOSINOPHIL # BLD AUTO: 0.28 10*3/MM3 (ref 0–0.4)
EOSINOPHIL NFR BLD AUTO: 2.9 % (ref 0.3–6.2)
ERYTHROCYTE [DISTWIDTH] IN BLOOD BY AUTOMATED COUNT: 21 % (ref 12.3–15.4)
FLUAV RNA RESP QL NAA+PROBE: NOT DETECTED
FLUBV RNA RESP QL NAA+PROBE: NOT DETECTED
GLOBULIN UR ELPH-MCNC: 3.2 GM/DL
GLUCOSE SERPL-MCNC: 92 MG/DL (ref 65–99)
HCT VFR BLD AUTO: 36.7 % (ref 34–46.6)
HCT VFR BLD AUTO: 38.3 % (ref 34–46.6)
HGB BLD-MCNC: 12.3 G/DL (ref 12–15.9)
HGB BLD-MCNC: 13 G/DL (ref 12–15.9)
HOLD SPECIMEN: NORMAL
IMM GRANULOCYTES # BLD AUTO: 0.03 10*3/MM3 (ref 0–0.05)
IMM GRANULOCYTES NFR BLD AUTO: 0.3 % (ref 0–0.5)
INR PPP: 1.19 (ref 0.84–1.13)
LYMPHOCYTES # BLD AUTO: 0.5 10*3/MM3 (ref 0.7–3.1)
LYMPHOCYTES NFR BLD AUTO: 5.2 % (ref 19.6–45.3)
MAXIMAL PREDICTED HEART RATE: 138 BPM
MCH RBC QN AUTO: 29.1 PG (ref 26.6–33)
MCHC RBC AUTO-ENTMCNC: 33.9 G/DL (ref 31.5–35.7)
MCV RBC AUTO: 85.9 FL (ref 79–97)
MONOCYTES # BLD AUTO: 0.74 10*3/MM3 (ref 0.1–0.9)
MONOCYTES NFR BLD AUTO: 7.7 % (ref 5–12)
NEUTROPHILS NFR BLD AUTO: 8.04 10*3/MM3 (ref 1.7–7)
NEUTROPHILS NFR BLD AUTO: 83.3 % (ref 42.7–76)
NRBC BLD AUTO-RTO: 0 /100 WBC (ref 0–0.2)
NT-PROBNP SERPL-MCNC: ABNORMAL PG/ML (ref 0–1800)
PLATELET # BLD AUTO: 461 10*3/MM3 (ref 140–450)
PMV BLD AUTO: 10.5 FL (ref 6–12)
POTASSIUM SERPL-SCNC: 3.8 MMOL/L (ref 3.5–5.2)
PROCALCITONIN SERPL-MCNC: 0.17 NG/ML (ref 0–0.25)
PROT SERPL-MCNC: 6.5 G/DL (ref 6–8.5)
PROTHROMBIN TIME: 15 SECONDS (ref 11.4–14.4)
QT INTERVAL: 398 MS
QTC INTERVAL: 398 MS
RBC # BLD AUTO: 4.46 10*6/MM3 (ref 3.77–5.28)
SARS-COV-2 RNA RESP QL NAA+PROBE: NOT DETECTED
SMALL PLATELETS BLD QL SMEAR: NORMAL
SODIUM SERPL-SCNC: 143 MMOL/L (ref 136–145)
STRESS TARGET HR: 117 BPM
TARGETS BLD QL SMEAR: NORMAL
TROPONIN T SERPL-MCNC: 0.02 NG/ML (ref 0–0.03)
UFH PPP CHRO-ACNC: 0.1 IU/ML (ref 0.3–0.7)
WBC MORPH BLD: NORMAL
WBC NRBC COR # BLD: 9.65 10*3/MM3 (ref 3.4–10.8)
WHOLE BLOOD HOLD COAG: NORMAL
WHOLE BLOOD HOLD SPECIMEN: NORMAL

## 2022-07-13 PROCEDURE — 99284 EMERGENCY DEPT VISIT MOD MDM: CPT

## 2022-07-13 PROCEDURE — 87150 DNA/RNA AMPLIFIED PROBE: CPT | Performed by: PHYSICIAN ASSISTANT

## 2022-07-13 PROCEDURE — 85014 HEMATOCRIT: CPT | Performed by: INTERNAL MEDICINE

## 2022-07-13 PROCEDURE — 87636 SARSCOV2 & INF A&B AMP PRB: CPT | Performed by: EMERGENCY MEDICINE

## 2022-07-13 PROCEDURE — 85007 BL SMEAR W/DIFF WBC COUNT: CPT | Performed by: EMERGENCY MEDICINE

## 2022-07-13 PROCEDURE — 86901 BLOOD TYPING SEROLOGIC RH(D): CPT

## 2022-07-13 PROCEDURE — 85025 COMPLETE CBC W/AUTO DIFF WBC: CPT | Performed by: EMERGENCY MEDICINE

## 2022-07-13 PROCEDURE — 36415 COLL VENOUS BLD VENIPUNCTURE: CPT

## 2022-07-13 PROCEDURE — 84145 PROCALCITONIN (PCT): CPT | Performed by: PHYSICIAN ASSISTANT

## 2022-07-13 PROCEDURE — 93005 ELECTROCARDIOGRAM TRACING: CPT | Performed by: EMERGENCY MEDICINE

## 2022-07-13 PROCEDURE — 85520 HEPARIN ASSAY: CPT | Performed by: PHYSICIAN ASSISTANT

## 2022-07-13 PROCEDURE — 93970 EXTREMITY STUDY: CPT

## 2022-07-13 PROCEDURE — 83605 ASSAY OF LACTIC ACID: CPT | Performed by: PHYSICIAN ASSISTANT

## 2022-07-13 PROCEDURE — 87040 BLOOD CULTURE FOR BACTERIA: CPT | Performed by: PHYSICIAN ASSISTANT

## 2022-07-13 PROCEDURE — 85730 THROMBOPLASTIN TIME PARTIAL: CPT | Performed by: PHYSICIAN ASSISTANT

## 2022-07-13 PROCEDURE — 87147 CULTURE TYPE IMMUNOLOGIC: CPT | Performed by: PHYSICIAN ASSISTANT

## 2022-07-13 PROCEDURE — 86900 BLOOD TYPING SEROLOGIC ABO: CPT

## 2022-07-13 PROCEDURE — 94640 AIRWAY INHALATION TREATMENT: CPT

## 2022-07-13 PROCEDURE — 71045 X-RAY EXAM CHEST 1 VIEW: CPT

## 2022-07-13 PROCEDURE — 80053 COMPREHEN METABOLIC PANEL: CPT | Performed by: EMERGENCY MEDICINE

## 2022-07-13 PROCEDURE — 85610 PROTHROMBIN TIME: CPT | Performed by: PHYSICIAN ASSISTANT

## 2022-07-13 PROCEDURE — 93970 EXTREMITY STUDY: CPT | Performed by: INTERNAL MEDICINE

## 2022-07-13 PROCEDURE — C9803 HOPD COVID-19 SPEC COLLECT: HCPCS | Performed by: EMERGENCY MEDICINE

## 2022-07-13 PROCEDURE — 84484 ASSAY OF TROPONIN QUANT: CPT | Performed by: EMERGENCY MEDICINE

## 2022-07-13 PROCEDURE — 99223 1ST HOSP IP/OBS HIGH 75: CPT | Performed by: INTERNAL MEDICINE

## 2022-07-13 PROCEDURE — 83880 ASSAY OF NATRIURETIC PEPTIDE: CPT | Performed by: EMERGENCY MEDICINE

## 2022-07-13 PROCEDURE — 85018 HEMOGLOBIN: CPT | Performed by: INTERNAL MEDICINE

## 2022-07-13 RX ORDER — HEPARIN SODIUM 1000 [USP'U]/ML
50 INJECTION, SOLUTION INTRAVENOUS; SUBCUTANEOUS AS NEEDED
Status: DISCONTINUED | OUTPATIENT
Start: 2022-07-13 | End: 2022-07-13

## 2022-07-13 RX ORDER — CARVEDILOL 6.25 MG/1
6.25 TABLET ORAL 2 TIMES DAILY
Status: DISCONTINUED | OUTPATIENT
Start: 2022-07-13 | End: 2022-07-17 | Stop reason: HOSPADM

## 2022-07-13 RX ORDER — TORSEMIDE 20 MG/1
20 TABLET ORAL DAILY
Status: DISCONTINUED | OUTPATIENT
Start: 2022-07-14 | End: 2022-07-17 | Stop reason: HOSPADM

## 2022-07-13 RX ORDER — HEPARIN SODIUM 1000 [USP'U]/ML
25 INJECTION, SOLUTION INTRAVENOUS; SUBCUTANEOUS AS NEEDED
Status: DISCONTINUED | OUTPATIENT
Start: 2022-07-13 | End: 2022-07-13

## 2022-07-13 RX ORDER — SODIUM CHLORIDE 0.9 % (FLUSH) 0.9 %
1-10 SYRINGE (ML) INJECTION AS NEEDED
Status: DISCONTINUED | OUTPATIENT
Start: 2022-07-13 | End: 2022-07-17 | Stop reason: HOSPADM

## 2022-07-13 RX ORDER — FENOFIBRATE 48 MG/1
48 TABLET, COATED ORAL DAILY
Status: DISCONTINUED | OUTPATIENT
Start: 2022-07-14 | End: 2022-07-13

## 2022-07-13 RX ORDER — HEPARIN SODIUM 1000 [USP'U]/ML
80 INJECTION, SOLUTION INTRAVENOUS; SUBCUTANEOUS ONCE
Status: DISCONTINUED | OUTPATIENT
Start: 2022-07-13 | End: 2022-07-13

## 2022-07-13 RX ORDER — HEPARIN SODIUM 10000 [USP'U]/100ML
18 INJECTION, SOLUTION INTRAVENOUS
Status: DISCONTINUED | OUTPATIENT
Start: 2022-07-13 | End: 2022-07-13

## 2022-07-13 RX ORDER — PROMETHAZINE HYDROCHLORIDE AND CODEINE PHOSPHATE 6.25; 1 MG/5ML; MG/5ML
5 SYRUP ORAL EVERY 6 HOURS PRN
Status: DISCONTINUED | OUTPATIENT
Start: 2022-07-13 | End: 2022-07-14

## 2022-07-13 RX ORDER — ONDANSETRON 4 MG/1
8 TABLET, FILM COATED ORAL EVERY 8 HOURS PRN
Status: DISCONTINUED | OUTPATIENT
Start: 2022-07-13 | End: 2022-07-17 | Stop reason: HOSPADM

## 2022-07-13 RX ORDER — SPIRONOLACTONE 25 MG/1
12.5 TABLET ORAL DAILY
Status: DISCONTINUED | OUTPATIENT
Start: 2022-07-14 | End: 2022-07-17 | Stop reason: HOSPADM

## 2022-07-13 RX ORDER — SODIUM CHLORIDE 0.9 % (FLUSH) 0.9 %
10 SYRINGE (ML) INJECTION EVERY 12 HOURS SCHEDULED
Status: DISCONTINUED | OUTPATIENT
Start: 2022-07-13 | End: 2022-07-17 | Stop reason: HOSPADM

## 2022-07-13 RX ORDER — SODIUM CHLORIDE 0.9 % (FLUSH) 0.9 %
10 SYRINGE (ML) INJECTION AS NEEDED
Status: DISCONTINUED | OUTPATIENT
Start: 2022-07-13 | End: 2022-07-17 | Stop reason: HOSPADM

## 2022-07-13 RX ORDER — HYDROCODONE BITARTRATE AND ACETAMINOPHEN 5; 325 MG/1; MG/1
1 TABLET ORAL EVERY 4 HOURS PRN
Status: DISCONTINUED | OUTPATIENT
Start: 2022-07-13 | End: 2022-07-14

## 2022-07-13 RX ORDER — ACETAMINOPHEN 160 MG/5ML
650 SOLUTION ORAL EVERY 4 HOURS PRN
Status: DISCONTINUED | OUTPATIENT
Start: 2022-07-13 | End: 2022-07-17 | Stop reason: HOSPADM

## 2022-07-13 RX ORDER — BUDESONIDE 0.5 MG/2ML
0.5 INHALANT ORAL
Status: DISCONTINUED | OUTPATIENT
Start: 2022-07-13 | End: 2022-07-17 | Stop reason: HOSPADM

## 2022-07-13 RX ORDER — DEXTROMETHORPHAN HYDROBROMIDE AND PROMETHAZINE HYDROCHLORIDE 15; 6.25 MG/5ML; MG/5ML
5 SYRUP ORAL 4 TIMES DAILY PRN
COMMUNITY

## 2022-07-13 RX ORDER — ACETAMINOPHEN 650 MG/1
650 SUPPOSITORY RECTAL EVERY 4 HOURS PRN
Status: DISCONTINUED | OUTPATIENT
Start: 2022-07-13 | End: 2022-07-17 | Stop reason: HOSPADM

## 2022-07-13 RX ORDER — ACETAMINOPHEN 325 MG/1
650 TABLET ORAL EVERY 4 HOURS PRN
Status: DISCONTINUED | OUTPATIENT
Start: 2022-07-13 | End: 2022-07-17 | Stop reason: HOSPADM

## 2022-07-13 RX ADMIN — PROMETHAZINE HYDROCHLORIDE AND CODEINE PHOSPHATE 5 ML: 6.25; 1 SOLUTION ORAL at 21:33

## 2022-07-13 RX ADMIN — BUDESONIDE 0.5 MG: 0.5 SUSPENSION RESPIRATORY (INHALATION) at 19:19

## 2022-07-13 RX ADMIN — Medication 10 ML: at 21:34

## 2022-07-13 RX ADMIN — CARVEDILOL 6.25 MG: 6.25 TABLET, FILM COATED ORAL at 21:34

## 2022-07-13 NOTE — CONSULTS
Brief pulmonary consultation:    Please note, a formal consultation and note will follow.    I was contacted about Mrs. Forte by Mr. Vergara in the emergency department.  The patient has a history of worsening metastatic hepatocellular carcinoma.  Yesterday it was discovered that on CT of the chest she has some small pulmonary emboli more so on the right base however at least one on the left.  She has had at least a 24-hour period of hemoptysis of bright red blood which is submassive.  She is currently on room air according to the chart and is hemodynamically stable.  I was asked about the feasibility and safety of possible anticoagulation in the face of thromboembolic disease especially given her recent history of hemoptysis.  At this time, these are small pulmonary emboli and likely are not contributing to respiratory failure at this point.  It is unclear on the right side whether we are dealing with a pulmonary infarction or if this represents metastatic mass with in situ clot in the vessel.  That most likely would be the site of the bleeding however.    At this point, I would not recommend anticoagulation as I feel the risk of bleeding far outweighs any potential benefit given her complicated case.   I would however recommend admission to the hospital for observation and then proceed with duplex ultrasounds of the bilateral lower extremities and if positive try to place an IVC filter to protect her from further embolic disease.    Per report, the patient does not want heroic measures performed.  I think that it would be reasonable to engage the services of palliative care and possibly hospice given her advanced disease.    Further evaluation to follow.    Fredy Contreras MD

## 2022-07-13 NOTE — PROGRESS NOTES
HEPARIN INFUSION  Desiree Forte is an 82 y.o. female receiving heparin infusion.     Therapy for (VTE/Cardiac): VTE  Patient Weight: 45.4kg  Initial Bolus (Y/N): Y  Any Bolus (Y/N): Y  Signs or Symptoms of Bleeding: none currently  Recommend Xa every 6 hours.     VTE (PE/DVT)   Initial Bolus: 80 units/kg (Max 10,000 units)  Initial rate: 18 units/kg/hr (Max 1,500 units/hr)    Anti Xa Rebolus Infusion Hold time Change infusion Dose (Units/kg/hr) Next Anti Xa Level Due   < 0.11 50 Units/kg  (4000 Units Max) None Increase by  4 Units/kg/hr 6 hours   0.11 - 0.19 25 Units/kg  (2000 Units Max) None Increase by  3 Units/kg/hr 6 hours   0.2 - 0.29 0 None Increase by  2 Units/kg/hr 6 hours   0.3 - 0.7 0 None No Change 6 hours (after 2 consecutive levels in range check qAM)   0.71 - 0.8 0 None Decrease by  1 Units/kg/hr 6 hours   0.81 - 0.9 0 None Decrease by  2 Units/kg/hr 6 hours   0.91 - 1 0 60 Minutes Decrease by  3 Units/kg/hr 6 hours   >1 0 Hold  After Anti Xa less than 0.7 decrease previous rate by  4 Units/kg/hr  Every 2 hours until Anti Xa is less than 0.7 then when infusion restarts in 6 hours     Results from last 7 days   Lab Units 07/13/22  0949   HEMOGLOBIN g/dL 13.0   HEMATOCRIT % 38.3   PLATELETS 10*3/mm3 461*       Date   Time   Anti-Xa Current Rate (Unit/kg/hr) Bolus   (Units) Rate Change   (Unit/kg/hr) New Rate (Unit/kg/hr) Next   Anti-Xa Comments  Pump Check Daily   7/13 pending pending -- 3630 +18 18 1400 Dw AILYN Angulo, PharmD  7/13/2022  10:50 EDT

## 2022-07-13 NOTE — SIGNIFICANT NOTE
Patient continued on home fenofibrate on admission. Received call from pharmacy. Fenofibrate is contraindicated in decrease renal function and has low probability of contributing to PE. Patient diagnosed with PE on CT chest, CrCl 23.4. Hold fenofibrate. Will address resuming it closer to [place].

## 2022-07-13 NOTE — H&P
[facility]  HISTORY AND PHYSICAL    Patient Name: Desiree Forte  : 1940  MRN: [id number]  Primary Care Physician: Kristofer Elder MD  Date of admission: 2022      Subjective   Subjective     Chief Complaint:  hemoptysis    HPI:  Desiree Forte is a 82 y.o. female with PMH of CHF, HLD, PVCs and Metastatic Hepatocellular Carcinoma followed by Dr. Pollock who presented to the ED with hemoptysis. Pt reports that she had a CTA chest earlier this week due to SOA and cough and has known small bilateral PE. She was supposed to fill Eliquis rx but had not had an opportunity to do so.  She was told to come to the ED if her O2 sat was low and/or she was coughing up blood- both of which occurred this am.  Pt's daughter reports that she had significant hypoxia of 74% at home and took awhile to improve with rest. She also notes a large amount of hemoptysis.  Pt denies any F/C.       Review of Systems   Gen- No fevers, chills  CV- No chest pain, palpitations  Resp- as above  GI- No N/V/D, abd pain      All other systems reviewed and are negative.     Personal History     Past Medical History:   Diagnosis Date   • Abnormal heart rhythm    • Cancer (HCC)     breast   • Clostridioides difficile diarrhea    • Congestive heart failure (CHF) (HCC)    • Hyperlipidemia    • Irregular heart beat    • Migraine     occular          Oncology Problem List:  Hepatocellular carcinoma metastatic to peritoneum (HCC) (2021;   Status: Active)    Oncology/Hematology History   Hepatocellular carcinoma metastatic to peritoneum (HCC)   2021 Initial Diagnosis    Hepatocellular carcinoma metastatic to peritoneum (HCC)     10/4/2021 -  Chemotherapy    OP HEPATOBILIARY Atezolizumab / Bevacizumab-awwb          Past Surgical History:   Procedure Laterality Date   • APPENDECTOMY     • BREAST SURGERY      lumpectomy with lymph nodes left    • COLONOSCOPY     • COLONOSCOPY  N/A 9/7/2021    Procedure: COLONOSCOPY;  Surgeon: Jesus Morton MD;  Location:  [facility];  Service: Gastroenterology;  Laterality: N/A;   • DIAGNOSTIC LAPAROSCOPY N/A 9/15/2021    Procedure: DIAGNOSTIC LAPAROSCOPY, OMENTAL BIOPSY AND PERITONEAL NODULE BIOPSY;  Surgeon: Nino Jaimes MD;  Location:  [facility];  Service: General;  Laterality: N/A;   • ENDOSCOPY N/A 9/7/2021    Procedure: ESOPHAGOGASTRODUODENOSCOPY WITH BIOPSY AND POLYPECTOMY;  Surgeon: Jesus Morton MD;  Location:  [facility];  Service: Gastroenterology;  Laterality: N/A;   • EYE SURGERY Bilateral     cataract   • HYSTERECTOMY         Family History:  family history includes Leukemia in her mother; Stroke in her father. Otherwise pertinent FHx was reviewed and unremarkable.     Social History:  reports that she has never smoked. She has never used smokeless tobacco. She reports previous alcohol use. She reports that she does not use drugs.  Social History     Social History Narrative   • Not on file       Medications:  Available home medication information reviewed.  (Not in a hospital admission)    No current facility-administered medications on file prior to encounter.     Current Outpatient Medications on File Prior to Encounter   Medication Sig Dispense Refill   • carvedilol (COREG) 6.25 MG tablet Take 1 tablet by mouth 2 (Two) Times a Day. 180 tablet 3   • fenofibrate (TRICOR) 145 MG tablet Take 1 tablet by mouth Daily. 90 tablet 3   • Flovent  MCG/ACT inhaler Inhale 1-2 puffs 2 (Two) Times a Day.     • ondansetron (ZOFRAN) 8 MG tablet Take 1 tablet by mouth 3 (Three) Times a Day As Needed for Nausea or Vomiting. 30 tablet 5   • promethazine-dextromethorphan (PROMETHAZINE-DM) 6.25-15 MG/5ML syrup Take 5 mL by mouth 4 (Four) Times a Day As Needed for Cough.     • spironolactone (ALDACTONE) 25 MG tablet Take 0.5 tablets by mouth Daily. 45 tablet 3   • torsemide (DEMADEX) 20 MG tablet Take 1 tablet by mouth  Daily. 90 tablet 3   • [DISCONTINUED] HYDROcodone-acetaminophen (NORCO) 5-325 MG per tablet 1-2 tabs every 6 hours as needed for abdominal pain. 60 tablet 0   • apixaban (ELIQUIS) 5 MG tablet tablet Take 1 tablet by mouth 2 (Two) Times a Day. 60 tablet 2   • [DISCONTINUED] dicyclomine (Bentyl) 10 MG capsule Take 1 capsule by mouth 3 (Three) Times a Day With Meals. 90 capsule 2         Allergies   Allergen Reactions   • Metformin Diarrhea   • Statins Myalgia     Muscle pain       Objective   Objective     Vital Signs:   Temp:  [98.5 °F (36.9 °C)] 98.5 °F (36.9 °C)  Heart Rate:  [61-68] 68  Resp:  [16] 16  BP: (112-118)/(51-59) 117/59       Physical Exam   Constitutional: Awake, alert, thin appearing WF  Eyes: PERRLA, sclerae anicteric, no conjunctival injection  HENT: NCAT, mucous membranes moist  Neck: Supple, no thyromegaly, no lymphadenopathy, trachea midline  Respiratory: Clear to auscultation bilaterally, nonlabored respirations   Cardiovascular: RRR with frequent PVCs noted on tele, no murmurs, rubs, or gallops, palpable pedal pulses bilaterally  Gastrointestinal: Positive bowel sounds, soft, nontender, nondistended  Musculoskeletal: No bilateral ankle edema, no clubbing or cyanosis to extremities  Psychiatric: Appropriate affect, cooperative  Neurologic: Oriented x 3, strength symmetric in all extremities, Cranial Nerves grossly intact to confrontation, speech clear  Skin: No rashes    Result Review:  I have personally reviewed the results from the time of this admission to 7/13/2022 13:04 EDT and agree with these findings:  [x]  Laboratory list / accordion  []  Microbiology  [x]  Radiology  []  EKG/Telemetry   []  Cardiology/Vascular   []  Pathology  []  Old records  []  Other:  Most notable findings include: see assessment and plan        LAB RESULTS:      Lab 07/13/22  0949   WBC 9.65   HEMOGLOBIN 13.0   HEMATOCRIT 38.3   PLATELETS 461*   NEUTROS ABS 8.04*   IMMATURE GRANS (ABS) 0.03   LYMPHS ABS 0.50*    MONOS ABS 0.74   EOS ABS 0.28   MCV 85.9   PROCALCITONIN 0.17   LACTATE 1.2   PROTIME 15.0*   INR 1.19*   APTT 38.7*   HEPARIN ANTI-XA 0.10*         Lab 07/13/22  0949   SODIUM 143   POTASSIUM 3.8   CHLORIDE 101   CO2 33.0*   ANION GAP 9.0   BUN 34*   CREATININE 1.33*   EGFR 40.0*   GLUCOSE 92   CALCIUM 10.1         Lab 07/13/22  0949   TOTAL PROTEIN 6.5   ALBUMIN 3.30*   GLOBULIN 3.2   ALT (SGPT) 10   AST (SGOT) 27   BILIRUBIN 1.5*   ALK PHOS 89         Lab 07/13/22  0949   PROBNP >70,000.0*   TROPONIN T 0.022                 UA    Urinalysis 5/12/22 6/2/22 6/23/22   Specific [place], UA 1.010 1.010 1.020   Ketones, UA Negative Negative Negative   Blood, UA Negative Negative Negative   Leukocytes, UA Negative Trace (A) Moderate (2+) (A)   Nitrite, UA Negative Negative Negative   (A) Abnormal value              Microbiology Results (last 10 days)     ** No results found for the last 240 hours. **          CT Chest With Contrast Diagnostic    Result Date: 7/11/2022  DATE OF EXAM: 7/11/2022 1:58 PM  PROCEDURE: CT CHEST W CONTRAST DIAGNOSTIC-, CT ABDOMEN PELVIS W CONTRAST-  INDICATIONS: R05.3; R05.3-Chronic cough  COMPARISON: 11/22/2021 chest, abdomen and pelvis CT scans.  TECHNIQUE: Routine transaxial slices were obtained through the chest after the intravenous administration of 100 mL of Isovue 300. Reconstructed coronal and sagittal images were also obtained. Automated exposure control and iterative construction methods were used.  The radiation dose reduction device was turned on for each scan per the ALARA (As Low as Reasonably Achievable) protocol.  FINDINGS: Patient history indicates hepatocellular carcinoma metastatic to peritoneum. Previous exam report from 11/22/2021 indicates stable appearance of the chest, abdomen pelvis with cirrhotic hepatic morphology, extensive metastatic peritoneal disease and trace ascites.  CT SCAN OF THE CHEST WITH IV CONTRAST: Compared to the 11/22/2021 exam, there are a few  shotty mediastinal lymph nodes which appear stable. No pericardial effusion is seen but there is a new, moderate free-flowing right pleural effusion and new but minimal left pleural effusion. Lungs show new multifocal disease, including an irregularly masslike area in the right lateral costophrenic angle, extending over a roughly 7 x 3 cm area, some adjacent reticular or reticulonodular disease in the right middle lobe, similar pleural-based masslike 2.5 cm opacity in the medial left upper lobe, and scattered areas of nodular and reticular disease, worrisome for irregular pulmonary metastases. Some superimposed component of atypical infection could be present. There actually appears to be a small embolus in the right lower lobe segmental vessel associated with the area of dense right lower lobe disease, and this particular lesion may represent a pulmonary infarct. Please refer to axial image 48 through image 53 series 2. There are small bilateral segmental and subsegmental emboli of both lower lobes elsewhere. No large or saddle embolus is seen. No potential embolic disease is identified elsewhere.      Impression: 1. Interval development of a small-to-moderate right pleural effusion and minimal left pleural effusion. 2. Multiple small bilateral lower lobe pulmonary emboli, the largest of which appears to be associated with a 7 x 3 cm lateral right lower lobe infarct. 3. Ill-defined multifocal nodular and reticular disease of lungs elsewhere are concerning for infiltrating metastases, less likely atypical pneumonia.    Note: Preliminary findings were called to Dr. Pollock at approximately 5:20 PM 07/11/2022.    CT SCAN OF THE ABDOMEN PELVIS WITH IV CONTRAST: As on the prior study, there is diffuse fatty liver change. Today's exam shows a very small focus of subcapsular liver enhancement in the inferior right liver lobe, nonspecific, and essentially unchanged from the prior study, possibly incidental to the  patient's known disease. No other hepatic lesions are identified. Spleen is not enlarged. No significant abnormalities are appreciated of the pancreas, adrenal glands, or kidneys except for renal cortical thinning, not unusual for the patient's age. Gallbladder is contracted but otherwise unremarkable.  The patient's advanced nodular peritoneal disease is overall relatively similar in distribution to the prior study, but with evidence of moderate gradual progression, including increasingly dense masslike anterior omental disease. No significant retroperitoneal lymphadenopathy is seen. There is little evidence of adenopathy in the small bowel mesentery. Bowel loops are normal in caliber. 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 Bladder is decompressed. Bony structures appear to be intact. Delayed venous phase images show no evidence of obstructive uropathy.  IMPRESSION:  1. Advanced, extensive peritoneal metastatic disease, with moderate general progression since 11/22/2021 exam. No evidence of intrinsic solid organ disease and no obvious retroperitoneal kortney disease. 2. Mild-to-moderate ascites, increased from prior study.  This report was finalized on 7/11/2022 10:02 PM by Dr. Caleb Trevino MD.      CT Abdomen Pelvis With Contrast    Result Date: 7/11/2022  DATE OF EXAM: 7/11/2022 1:58 PM  PROCEDURE: CT CHEST W CONTRAST DIAGNOSTIC-, CT ABDOMEN PELVIS W CONTRAST-  INDICATIONS: R05.3; R05.3-Chronic cough  COMPARISON: 11/22/2021 chest, abdomen and pelvis CT scans.  TECHNIQUE: Routine transaxial slices were obtained through the chest after the intravenous administration of 100 mL of Isovue 300. Reconstructed coronal and sagittal images were also obtained. Automated exposure control and iterative construction methods were used.  The radiation dose reduction device was turned on for each scan per the ALARA (As Low as Reasonably Achievable) protocol.  FINDINGS: Patient history indicates hepatocellular  carcinoma metastatic to peritoneum. Previous exam report from 11/22/2021 indicates stable appearance of the chest, abdomen pelvis with cirrhotic hepatic morphology, extensive metastatic peritoneal disease and trace ascites.  CT SCAN OF THE CHEST WITH IV CONTRAST: Compared to the 11/22/2021 exam, there are a few shotty mediastinal lymph nodes which appear stable. No pericardial effusion is seen but there is a new, moderate free-flowing right pleural effusion and new but minimal left pleural effusion. Lungs show new multifocal disease, including an irregularly masslike area in the right lateral costophrenic angle, extending over a roughly 7 x 3 cm area, some adjacent reticular or reticulonodular disease in the right middle lobe, similar pleural-based masslike 2.5 cm opacity in the medial left upper lobe, and scattered areas of nodular and reticular disease, worrisome for irregular pulmonary metastases. Some superimposed component of atypical infection could be present. There actually appears to be a small embolus in the right lower lobe segmental vessel associated with the area of dense right lower lobe disease, and this particular lesion may represent a pulmonary infarct. Please refer to axial image 48 through image 53 series 2. There are small bilateral segmental and subsegmental emboli of both lower lobes elsewhere. No large or saddle embolus is seen. No potential embolic disease is identified elsewhere.      Impression: 1. Interval development of a small-to-moderate right pleural effusion and minimal left pleural effusion. 2. Multiple small bilateral lower lobe pulmonary emboli, the largest of which appears to be associated with a 7 x 3 cm lateral right lower lobe infarct. 3. Ill-defined multifocal nodular and reticular disease of lungs elsewhere are concerning for infiltrating metastases, less likely atypical pneumonia.    Note: Preliminary findings were called to Dr. Pollock at approximately 5:20 PM  07/11/2022.    CT SCAN OF THE ABDOMEN PELVIS WITH IV CONTRAST: As on the prior study, there is diffuse fatty liver change. Today's exam shows a very small focus of subcapsular liver enhancement in the inferior right liver lobe, nonspecific, and essentially unchanged from the prior study, possibly incidental to the patient's known disease. No other hepatic lesions are identified. Spleen is not enlarged. No significant abnormalities are appreciated of the pancreas, adrenal glands, or kidneys except for renal cortical thinning, not unusual for the patient's age. Gallbladder is contracted but otherwise unremarkable.  The patient's advanced nodular peritoneal disease is overall relatively similar in distribution to the prior study, but with evidence of moderate gradual progression, including increasingly dense masslike anterior omental disease. No significant retroperitoneal lymphadenopathy is seen. There is little evidence of adenopathy in the small bowel mesentery. Bowel loops are normal in caliber. 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 Bladder is decompressed. Bony structures appear to be intact. Delayed venous phase images show no evidence of obstructive uropathy.  IMPRESSION:  1. Advanced, extensive peritoneal metastatic disease, with moderate general progression since 11/22/2021 exam. No evidence of intrinsic solid organ disease and no obvious retroperitoneal kortney disease. 2. Mild-to-moderate ascites, increased from prior study.  This report was finalized on 7/11/2022 10:02 PM by Dr. Caleb Trevino MD.      XR Chest 1 View    Result Date: 7/13/2022  XR CHEST 1 VW-  Date of Exam: 7/13/2022 10:27 AM  Indication: SOA triage protocol.  Comparison Exams: CT chest from July 11, 2022  Technique: Single AP chest radiograph  FINDINGS: There is a right basilar consolidation and a small right pleural effusion. The heart is enlarged. There is pulmonary vascular congestion. The osseous structures appear intact.       Impression: 1.  Right basilar consolidation, which could represent atelectasis or pneumonia. 2.  Pulmonary vascular congestion with small right pleural effusion. 3.  Cardiomegaly.  This report was finalized on 7/13/2022 10:45 AM by Alfonzo Bunn MD.        Results for orders placed during the hospital encounter of 06/02/22    Adult Transthoracic Echo Complete w/ Color, Spectral and Contrast if necessary per protocol    Interpretation Summary  · Left atrial volume is moderately increased.  · Estimated right ventricular systolic pressure from tricuspid regurgitation is mildly elevated (35-45 mmHg).  · Estimated left ventricular EF = 32% Estimated left ventricular EF was in agreement with the calculated left ventricular EF. Left ventricular ejection fraction appears to be 31 - 35%. Left ventricular systolic function is severely decreased.  · The following left ventricular wall segments are hypokinetic: mid anterior, apical anterior, basal anterolateral, mid anterolateral, apical lateral, basal inferolateral, mid inferolateral, apical inferior, mid inferior, apical septal, basal inferoseptal, mid inferoseptal, apex hypokinetic, mid anteroseptal, basal anterior, basal inferior and basal inferoseptal.  · The left ventricular cavity is mildly dilated.  · The findings are consistent with dilated cardiomyopathy.  · Left ventricular diastolic function is consistent with (grade II w/high LAP) pseudonormalization.  · Moderate to severe mitral valve regurgitation is present with an eccentric jet noted.  · Moderate tricuspid valve regurgitation is present.  · Mild to moderate pulmonary hypertension is present.  · Normal right ventricular cavity size, wall thickness, systolic function and septal motion noted.  · There is no evidence of pericardial effusion.      Assessment & Plan   Assessment & Plan     Active Hospital Problems    Diagnosis  POA   • Hemoptysis [R04.2]  Yes     Priority: High   • Hepatocellular carcinoma  metastatic to peritoneum (HCC) [C22.0, C78.6]  Yes     Priority: High   • PVC's (premature ventricular contractions) [I49.3]  Yes     Priority: Low   • Essential hypertension [I10]  Yes     Priority: Low       Ms. Forte is an 83 yo F with PMH of CHF, HLD, PVCs and Metastatic Hepatocellular Carcinoma followed by Dr. Pollock who presented to the ED with hemoptysis.  Pt was found to have small pulmonary emboli.  We were asked to admit for further management.    Plan:    Hemoptysis  Bilateral Pulmonary Emboli  Right basilar consolidation  -- Pulmonary has seen, do not recommend anticoagulation at this point due to hemoptysis  -- monitor H&H  -- suspect right basilar consolidation is infarction vs metastatic mass vs (less likely) infection.  -- defer ABX for time being  -- supportive care, wean supplemental O2 as tolerated  -- consult Palliative for possible Hospice referral  -- check TTE   -- awaiting duplex of BLE, if significant burden in BLEs then plan for possible filter    HFrEF  -- most recent TTE from June 2022 showed an EF of 32% with diastolic dysfunction noted as well  -- proBNP significantly elevated on admission at 70K but suspect this may due to right heart strain in setting of above  -- will repeat TTE this admission  -- continue home diuretics for time being, monitor closely for HD instability given above    Metastatic HCC  --followed by Dr. Pollock, courtesy consult him. Pt's daughter wanted to hear results of recent workup directly from their team. Appreciate Dr. Pickens  -- consult Palliative as above    Frequent PVCs  -- continue home meds  -- replace electrolytes as indicated    CKD  -- stage IIIb  -- Cr at baseline, monitor    DVT prophylaxis:  mechanical      CODE STATUS:  DNR/DNI  Code Status and Medical Interventions:   Ordered at: 07/13/22 6145     Medical Intervention Limits:    NO intubation (DNI)    NO cardioversion    NO dialysis    NO antibiotics    NO artificial nutrition    NO  vasopressors     Level Of Support Discussed With:    Patient     Code Status (Patient has no pulse and is not breathing):    No CPR (Do Not Attempt to Resuscitate)     Medical Interventions (Patient has pulse or is breathing):    Limited Support         Sirisha Morton MD  07/13/22

## 2022-07-13 NOTE — ED PROVIDER NOTES
"Subjective   Ms. Forte is a pleasant 82-year-old female with a history of metastatic hepatocellular carcinoma who presents to the emergency department after coughing up bright red bloody sputum this morning.  The patient was diagnosed with stage IV liver cancer last year and was started on immunotherapy infusions every 3 weeks.  She recently began complaining of throat pain and was seen by ENT (Dr. Salter) for her throat pain.  She underwent laryngoscopy and was felt to have \"an inflamed nerve\" causing her throat pain.  The patient also was scheduled for CT abd/pelvis to evaluate whether her liver cancer was responding to her immunotherapy.  A CTA chest was also performed yesterday due to some recent shortness of breath and cough.  The CTA chest showed multiple small bilateral pulmonary emboli and some nodular appearing opacities concerning for metastasis vs pneumonia.  The CT abd/pelvis showed progression of her primary liver cancer.  Her oncologist, Dr. Pollock, [facility] but she states the pharmacy did not have it yet and she has not started taking it.  The pt notes that this morning, her O2 sats dropped into the 70s and 80s during a coughing spell but then improved into the 90s on RA.  She is not on any home O2.  She states she is currently without pain and has no current shortness of breath.          Review of Systems   Constitutional: Negative for chills and fever.   HENT: Positive for sore throat.    Respiratory: Positive for cough and shortness of breath.         Coughing up bright red blood   Cardiovascular: Negative for chest pain and palpitations.   Gastrointestinal: Negative for abdominal pain, nausea and vomiting.   Genitourinary: Negative for dysuria.   Musculoskeletal: Negative for back pain.   Skin: Negative for pallor.   Neurological: Negative for light-headedness.   Hematological: Does not bruise/bleed easily.   Psychiatric/Behavioral: Negative for " PAIN - ADULT    • Verbalizes/displays adequate comfort level or patient's stated pain goal Progressing        Patient/Family Goals    • Patient/Family Long Term Goal Progressing    • Patient/Family Short Term Goal Progressing        RESPIRATORY - ADULT confusion.       Past Medical History:   Diagnosis Date   • Abnormal heart rhythm    • Cancer (HCC)     breast   • Clostridioides difficile diarrhea    • Congestive heart failure (CHF) (HCC)    • Hyperlipidemia    • Irregular heart beat    • Migraine     occular        Allergies   Allergen Reactions   • Metformin Diarrhea   • Statins Myalgia     Muscle pain       Past Surgical History:   Procedure Laterality Date   • APPENDECTOMY     • BREAST SURGERY      lumpectomy with lymph nodes left    • COLONOSCOPY     • COLONOSCOPY N/A 9/7/2021    Procedure: COLONOSCOPY;  Surgeon: Jesus Morton MD;  Location:  [facility];  Service: Gastroenterology;  Laterality: N/A;   • DIAGNOSTIC LAPAROSCOPY N/A 9/15/2021    Procedure: DIAGNOSTIC LAPAROSCOPY, OMENTAL BIOPSY AND PERITONEAL NODULE BIOPSY;  Surgeon: Nino Jaimes MD;  Location:  [facility];  Service: General;  Laterality: N/A;   • ENDOSCOPY N/A 9/7/2021    Procedure: ESOPHAGOGASTRODUODENOSCOPY WITH BIOPSY AND POLYPECTOMY;  Surgeon: Jesus Morton MD;  Location:  [facility];  Service: Gastroenterology;  Laterality: N/A;   • EYE SURGERY Bilateral     cataract   • HYSTERECTOMY         Family History   Problem Relation Age of Onset   • Leukemia Mother    • Stroke Father    • Colon cancer Neg Hx    • Colon polyps Neg Hx    • Esophageal cancer Neg Hx        Social History     Socioeconomic History   • Marital status:    Tobacco Use   • Smoking status: Never Smoker   • Smokeless tobacco: Never Used   Vaping Use   • Vaping Use: Never used   Substance and Sexual Activity   • Alcohol use: Not Currently   • Drug use: Never   • Sexual activity: Defer           Objective   Physical Exam  Constitutional:       General: She is not in acute distress.     Appearance: She is well-developed.   HENT:      Head: Normocephalic.      Nose: Nose normal.      Mouth/Throat:      Mouth: Mucous membranes are moist.   Eyes:      Pupils: Pupils are equal, round, and  reactive to light.   Cardiovascular:      Rate and Rhythm: Normal rate and regular rhythm.      Pulses: Normal pulses.   Pulmonary:      Effort: Pulmonary effort is normal.      Comments: Mild bilateral rhonchi    Abdominal:      General: Bowel sounds are normal.      Tenderness: There is no abdominal tenderness. There is no guarding.   Musculoskeletal:         General: No tenderness. Normal range of motion.      Cervical back: Normal range of motion.      Right lower leg: No edema.      Left lower leg: No edema.   Skin:     General: Skin is warm and dry.      Coloration: Skin is not pale.   Neurological:      General: No focal deficit present.      Mental Status: She is alert.   Psychiatric:         Mood and Affect: Mood normal.         Procedures           ED Course      The pt appears in no acute distress.  No current hemoptysis.  O2 sats in the mid 90s on RA.  I reviewed her recent prior records and her recent CTA chest and CT abd/pelvis.  Unfortunately, she has progression of her liver cancer and her CTA chest shows multiple small bilateral PEs and some nodular opacification that is concerning for metastasis vs PNA.  Her H&H is good at 13/38.  Her proBNP is quite elevated at >70,000.      Chest xray:  1.  Right basilar consolidation, which could represent atelectasis or  pneumonia.  2.  Pulmonary vascular congestion with small right pleural effusion.  3.  Cardiomegaly    Given her multiple PEs along with hemoptysis, I felt that heparin infusion was more appropriate than Eliquis, so that if she developed worsening bleeding, it could be stopped/reversed.  I consulted pulmonology (Dr. Contreras) who discussed the case with the intensivist and reviewed her recent imaging.  It was felt that the pt should not be anticoagulated at this time due to risk of making her hemoptysis worse.  He felt that she should be admitted to the floor and that she would need a doppler of her lower extremities and consideration of an IVC  "filter.  He will consult on admission.  The initial heparin I had ordered had not been started yet and was cancelled.  I  Spoke with the pt about her workup and plans for admission.  She is agreeable with the plan.  She states she does not want \"heroic\" efforts if her condition were to abruptly worsen.  She states she has a living will.                                         MDM    Final diagnoses:   Hemoptysis   Bilateral pulmonary embolism (HCC)   Cancer, hepatocellular (HCC)   Pneumonia of right lower lobe due to infectious organism       ED Disposition  ED Disposition     ED Disposition   Decision to Admit    Condition   --    Comment   --             No follow-up provider specified.       Medication List      No changes were made to your prescriptions during this visit.          Praveen Vergara PA  07/13/22 1126       Praveen Vergara PA  07/13/22 1129       Praveen Vergara PA  07/13/22 1137    "

## 2022-07-14 ENCOUNTER — APPOINTMENT (OUTPATIENT)
Dept: CARDIOLOGY | Facility: HOSPITAL | Age: 82
End: 2022-07-14

## 2022-07-14 ENCOUNTER — APPOINTMENT (OUTPATIENT)
Dept: GENERAL RADIOLOGY | Facility: HOSPITAL | Age: 82
End: 2022-07-14

## 2022-07-14 ENCOUNTER — APPOINTMENT (OUTPATIENT)
Dept: ONCOLOGY | Facility: HOSPITAL | Age: 82
End: 2022-07-14

## 2022-07-14 LAB
ABO GROUP BLD: NORMAL
ABO GROUP BLD: NORMAL
ANION GAP SERPL CALCULATED.3IONS-SCNC: 7 MMOL/L (ref 5–15)
ASCENDING AORTA: 2.8 CM
BACTERIA BLD CULT: ABNORMAL
BASOPHILS # BLD AUTO: 0.03 10*3/MM3 (ref 0–0.2)
BASOPHILS NFR BLD AUTO: 0.4 % (ref 0–1.5)
BH CV ECHO MEAS - AO MAX PG: 7.4 MMHG
BH CV ECHO MEAS - AO MEAN PG: 4 MMHG
BH CV ECHO MEAS - AO ROOT DIAM: 2.6 CM
BH CV ECHO MEAS - AO V2 MAX: 136 CM/SEC
BH CV ECHO MEAS - AO V2 VTI: 26.5 CM
BH CV ECHO MEAS - AVA(I,D): 1.35 CM2
BH CV ECHO MEAS - EDV(CUBED): 166.4 ML
BH CV ECHO MEAS - EDV(MOD-SP2): 84.7 ML
BH CV ECHO MEAS - EDV(MOD-SP4): 70.9 ML
BH CV ECHO MEAS - EF(MOD-BP): 40.9 %
BH CV ECHO MEAS - EF(MOD-SP2): 45.3 %
BH CV ECHO MEAS - EF(MOD-SP4): 40.2 %
BH CV ECHO MEAS - ESV(CUBED): 79.5 ML
BH CV ECHO MEAS - ESV(MOD-SP2): 46.3 ML
BH CV ECHO MEAS - ESV(MOD-SP4): 42.4 ML
BH CV ECHO MEAS - FS: 21.8 %
BH CV ECHO MEAS - IVS/LVPW: 0.54 CM
BH CV ECHO MEAS - IVSD: 1.2 CM
BH CV ECHO MEAS - LA DIMENSION: 4.7 CM
BH CV ECHO MEAS - LV DIASTOLIC VOL/BSA (35-75): 48.7 CM2
BH CV ECHO MEAS - LV MASS(C)D: 213.2 GRAMS
BH CV ECHO MEAS - LV MAX PG: 1.41 MMHG
BH CV ECHO MEAS - LV MEAN PG: 1 MMHG
BH CV ECHO MEAS - LV SYSTOLIC VOL/BSA (12-30): 29.1 CM2
BH CV ECHO MEAS - LV V1 MAX: 59.4 CM/SEC
BH CV ECHO MEAS - LV V1 VTI: 11.4 CM
BH CV ECHO MEAS - LVIDD: 5.5 CM
BH CV ECHO MEAS - LVIDS: 4.3 CM
BH CV ECHO MEAS - LVOT AREA: 3.1 CM2
BH CV ECHO MEAS - LVOT DIAM: 2 CM
BH CV ECHO MEAS - LVPWD: 1.3 CM
BH CV ECHO MEAS - MR MAX PG: 82.1 MMHG
BH CV ECHO MEAS - MR MAX VEL: 453 CM/SEC
BH CV ECHO MEAS - MR MEAN PG: 51 MMHG
BH CV ECHO MEAS - MR MEAN VEL: 329 CM/SEC
BH CV ECHO MEAS - MR VTI: 144 CM
BH CV ECHO MEAS - MV A MAX VEL: 81.7 CM/SEC
BH CV ECHO MEAS - MV DEC SLOPE: 703.5 CM/SEC2
BH CV ECHO MEAS - MV DEC TIME: 0.16 MSEC
BH CV ECHO MEAS - MV E MAX VEL: 119 CM/SEC
BH CV ECHO MEAS - MV E/A: 1.46
BH CV ECHO MEAS - MV MAX PG: 5.7 MMHG
BH CV ECHO MEAS - MV MEAN PG: 2 MMHG
BH CV ECHO MEAS - MV P1/2T: 50.8 MSEC
BH CV ECHO MEAS - MV V2 VTI: 30.1 CM
BH CV ECHO MEAS - MVA(P1/2T): 4.3 CM2
BH CV ECHO MEAS - MVA(VTI): 1.19 CM2
BH CV ECHO MEAS - PA ACC TIME: 0.13 SEC
BH CV ECHO MEAS - PA PR(ACCEL): 18.7 MMHG
BH CV ECHO MEAS - PA V2 MAX: 94.6 CM/SEC
BH CV ECHO MEAS - PI END-D VEL: 159 CM/SEC
BH CV ECHO MEAS - RAP SYSTOLE: 3 MMHG
BH CV ECHO MEAS - RF(MV,LVOT)(1DIAM): 0.85 CM
BH CV ECHO MEAS - RVSP: 40.2 MMHG
BH CV ECHO MEAS - SI(MOD-SP2): 26.4 ML/M2
BH CV ECHO MEAS - SI(MOD-SP4): 19.6 ML/M2
BH CV ECHO MEAS - SV(LVOT): 35.8 ML
BH CV ECHO MEAS - SV(MOD-SP2): 38.4 ML
BH CV ECHO MEAS - SV(MOD-SP4): 28.5 ML
BH CV ECHO MEAS - TAPSE (>1.6): 1.5 CM
BH CV ECHO MEAS - TR MAX PG: 37.2 MMHG
BH CV ECHO MEAS - TR MAX VEL: 305 CM/SEC
BH CV VAS BP LEFT ARM: NORMAL MMHG
BH CV XLRA - RV BASE: 3.5 CM
BH CV XLRA - RV LENGTH: 7.1 CM
BH CV XLRA - RV MID: 3.2 CM
BLD GP AB SCN SERPL QL: NEGATIVE
BOTTLE TYPE: ABNORMAL
BUN SERPL-MCNC: 32 MG/DL (ref 8–23)
BUN/CREAT SERPL: 27.4 (ref 7–25)
CALCIUM SPEC-SCNC: 9.4 MG/DL (ref 8.6–10.5)
CHLORIDE SERPL-SCNC: 102 MMOL/L (ref 98–107)
CHOLEST SERPL-MCNC: 113 MG/DL (ref 0–200)
CO2 SERPL-SCNC: 33 MMOL/L (ref 22–29)
CREAT SERPL-MCNC: 1.17 MG/DL (ref 0.57–1)
DEPRECATED RDW RBC AUTO: 62.4 FL (ref 37–54)
EGFRCR SERPLBLD CKD-EPI 2021: 46.7 ML/MIN/1.73
EOSINOPHIL # BLD AUTO: 0.27 10*3/MM3 (ref 0–0.4)
EOSINOPHIL NFR BLD AUTO: 3.4 % (ref 0.3–6.2)
ERYTHROCYTE [DISTWIDTH] IN BLOOD BY AUTOMATED COUNT: 20.4 % (ref 12.3–15.4)
GLUCOSE SERPL-MCNC: 75 MG/DL (ref 65–99)
HBA1C MFR BLD: 5.6 % (ref 4.8–5.6)
HCT VFR BLD AUTO: 36.3 % (ref 34–46.6)
HCT VFR BLD AUTO: 36.8 % (ref 34–46.6)
HDLC SERPL-MCNC: 15 MG/DL (ref 40–60)
HGB BLD-MCNC: 12.1 G/DL (ref 12–15.9)
HGB BLD-MCNC: 12.2 G/DL (ref 12–15.9)
IMM GRANULOCYTES # BLD AUTO: 0.04 10*3/MM3 (ref 0–0.05)
IMM GRANULOCYTES NFR BLD AUTO: 0.5 % (ref 0–0.5)
IVRT: 55 MSEC
LDLC SERPL CALC-MCNC: 75 MG/DL (ref 0–100)
LDLC/HDLC SERPL: 4.89 {RATIO}
LEFT ATRIUM VOLUME INDEX: 44.7 ML/M2
LV EF 2D ECHO EST: 40 %
LYMPHOCYTES # BLD AUTO: 0.63 10*3/MM3 (ref 0.7–3.1)
LYMPHOCYTES NFR BLD AUTO: 7.8 % (ref 19.6–45.3)
MAXIMAL PREDICTED HEART RATE: 138 BPM
MCH RBC QN AUTO: 29 PG (ref 26.6–33)
MCHC RBC AUTO-ENTMCNC: 33.3 G/DL (ref 31.5–35.7)
MCV RBC AUTO: 87.1 FL (ref 79–97)
MONOCYTES # BLD AUTO: 0.66 10*3/MM3 (ref 0.1–0.9)
MONOCYTES NFR BLD AUTO: 8.2 % (ref 5–12)
MR PISA EROA: 0.21 CM2
MV REGURGITANT FRACTION: 12 %
MV REGURGITANT VOLUME: 30 CC
NEUTROPHILS NFR BLD AUTO: 6.4 10*3/MM3 (ref 1.7–7)
NEUTROPHILS NFR BLD AUTO: 79.7 % (ref 42.7–76)
NRBC BLD AUTO-RTO: 0 /100 WBC (ref 0–0.2)
PISA ALIASING VEL: 0.31 M/S
PISA RADIUS: 0.7 CM
PLATELET # BLD AUTO: 464 10*3/MM3 (ref 140–450)
PMV BLD AUTO: 10.6 FL (ref 6–12)
POTASSIUM SERPL-SCNC: 4.1 MMOL/L (ref 3.5–5.2)
RBC # BLD AUTO: 4.17 10*6/MM3 (ref 3.77–5.28)
RH BLD: NEGATIVE
RH BLD: NEGATIVE
SODIUM SERPL-SCNC: 142 MMOL/L (ref 136–145)
STRESS TARGET HR: 117 BPM
T&S EXPIRATION DATE: NORMAL
T4 FREE SERPL-MCNC: 1.69 NG/DL (ref 0.93–1.7)
TRIGL SERPL-MCNC: 123 MG/DL (ref 0–150)
TSH SERPL DL<=0.05 MIU/L-ACNC: 11.76 UIU/ML (ref 0.27–4.2)
VLDLC SERPL-MCNC: 23 MG/DL (ref 5–40)
WBC NRBC COR # BLD: 8.03 10*3/MM3 (ref 3.4–10.8)

## 2022-07-14 PROCEDURE — 83036 HEMOGLOBIN GLYCOSYLATED A1C: CPT | Performed by: INTERNAL MEDICINE

## 2022-07-14 PROCEDURE — 85018 HEMOGLOBIN: CPT | Performed by: INTERNAL MEDICINE

## 2022-07-14 PROCEDURE — 97165 OT EVAL LOW COMPLEX 30 MIN: CPT

## 2022-07-14 PROCEDURE — 99233 SBSQ HOSP IP/OBS HIGH 50: CPT | Performed by: INTERNAL MEDICINE

## 2022-07-14 PROCEDURE — 87040 BLOOD CULTURE FOR BACTERIA: CPT | Performed by: INTERNAL MEDICINE

## 2022-07-14 PROCEDURE — 94799 UNLISTED PULMONARY SVC/PX: CPT

## 2022-07-14 PROCEDURE — 86900 BLOOD TYPING SEROLOGIC ABO: CPT | Performed by: INTERNAL MEDICINE

## 2022-07-14 PROCEDURE — 86850 RBC ANTIBODY SCREEN: CPT | Performed by: INTERNAL MEDICINE

## 2022-07-14 PROCEDURE — 93306 TTE W/DOPPLER COMPLETE: CPT

## 2022-07-14 PROCEDURE — 99222 1ST HOSP IP/OBS MODERATE 55: CPT | Performed by: INTERNAL MEDICINE

## 2022-07-14 PROCEDURE — 84439 ASSAY OF FREE THYROXINE: CPT | Performed by: INTERNAL MEDICINE

## 2022-07-14 PROCEDURE — 92610 EVALUATE SWALLOWING FUNCTION: CPT

## 2022-07-14 PROCEDURE — 93306 TTE W/DOPPLER COMPLETE: CPT | Performed by: INTERNAL MEDICINE

## 2022-07-14 PROCEDURE — 84443 ASSAY THYROID STIM HORMONE: CPT | Performed by: INTERNAL MEDICINE

## 2022-07-14 PROCEDURE — 80061 LIPID PANEL: CPT | Performed by: INTERNAL MEDICINE

## 2022-07-14 PROCEDURE — 92611 MOTION FLUOROSCOPY/SWALLOW: CPT

## 2022-07-14 PROCEDURE — 80048 BASIC METABOLIC PNL TOTAL CA: CPT | Performed by: INTERNAL MEDICINE

## 2022-07-14 PROCEDURE — 85014 HEMATOCRIT: CPT | Performed by: INTERNAL MEDICINE

## 2022-07-14 PROCEDURE — 74230 X-RAY XM SWLNG FUNCJ C+: CPT

## 2022-07-14 PROCEDURE — 85025 COMPLETE CBC W/AUTO DIFF WBC: CPT | Performed by: PHYSICIAN ASSISTANT

## 2022-07-14 PROCEDURE — 86901 BLOOD TYPING SEROLOGIC RH(D): CPT | Performed by: INTERNAL MEDICINE

## 2022-07-14 RX ORDER — PROMETHAZINE HYDROCHLORIDE AND CODEINE PHOSPHATE 6.25; 1 MG/5ML; MG/5ML
5 SYRUP ORAL EVERY 6 HOURS PRN
Status: DISCONTINUED | OUTPATIENT
Start: 2022-07-14 | End: 2022-07-17 | Stop reason: HOSPADM

## 2022-07-14 RX ORDER — GUAIFENESIN 600 MG/1
600 TABLET, EXTENDED RELEASE ORAL 2 TIMES DAILY PRN
Status: DISCONTINUED | OUTPATIENT
Start: 2022-07-14 | End: 2022-07-14

## 2022-07-14 RX ORDER — GABAPENTIN 100 MG/1
100 CAPSULE ORAL EVERY 12 HOURS SCHEDULED
Status: DISCONTINUED | OUTPATIENT
Start: 2022-07-14 | End: 2022-07-15

## 2022-07-14 RX ORDER — GUAIFENESIN AND DEXTROMETHORPHAN HYDROBROMIDE 600; 30 MG/1; MG/1
1 TABLET, EXTENDED RELEASE ORAL 2 TIMES DAILY PRN
Status: DISCONTINUED | OUTPATIENT
Start: 2022-07-14 | End: 2022-07-17 | Stop reason: HOSPADM

## 2022-07-14 RX ORDER — DOCUSATE SODIUM 100 MG/1
100 CAPSULE, LIQUID FILLED ORAL 2 TIMES DAILY
Status: DISCONTINUED | OUTPATIENT
Start: 2022-07-14 | End: 2022-07-17 | Stop reason: HOSPADM

## 2022-07-14 RX ORDER — SIMETHICONE 80 MG
80 TABLET,CHEWABLE ORAL 4 TIMES DAILY PRN
Status: DISCONTINUED | OUTPATIENT
Start: 2022-07-14 | End: 2022-07-17 | Stop reason: HOSPADM

## 2022-07-14 RX ORDER — SODIUM CHLORIDE 9 MG/ML
50 INJECTION, SOLUTION INTRAVENOUS CONTINUOUS
Status: ACTIVE | OUTPATIENT
Start: 2022-07-14 | End: 2022-07-14

## 2022-07-14 RX ORDER — LIDOCAINE 50 MG/G
1 PATCH TOPICAL
Status: DISCONTINUED | OUTPATIENT
Start: 2022-07-14 | End: 2022-07-17 | Stop reason: HOSPADM

## 2022-07-14 RX ADMIN — SIMETHICONE 80 MG: 80 TABLET, CHEWABLE ORAL at 21:20

## 2022-07-14 RX ADMIN — CARVEDILOL 6.25 MG: 6.25 TABLET, FILM COATED ORAL at 21:20

## 2022-07-14 RX ADMIN — GABAPENTIN 100 MG: 100 CAPSULE ORAL at 21:20

## 2022-07-14 RX ADMIN — BUDESONIDE 0.5 MG: 0.5 SUSPENSION RESPIRATORY (INHALATION) at 19:52

## 2022-07-14 RX ADMIN — DOCUSATE SODIUM 100 MG: 100 CAPSULE, LIQUID FILLED ORAL at 11:49

## 2022-07-14 RX ADMIN — PROMETHAZINE HYDROCHLORIDE AND CODEINE PHOSPHATE 5 ML: 6.25; 1 SOLUTION ORAL at 21:20

## 2022-07-14 RX ADMIN — TORSEMIDE 20 MG: 20 TABLET ORAL at 08:00

## 2022-07-14 RX ADMIN — DOCUSATE SODIUM 100 MG: 100 CAPSULE, LIQUID FILLED ORAL at 21:20

## 2022-07-14 RX ADMIN — SPIRONOLACTONE 12.5 MG: 25 TABLET ORAL at 08:00

## 2022-07-14 RX ADMIN — CARVEDILOL 6.25 MG: 6.25 TABLET, FILM COATED ORAL at 08:00

## 2022-07-14 RX ADMIN — BARIUM SULFATE 100 ML: 0.81 POWDER, FOR SUSPENSION ORAL at 12:55

## 2022-07-14 RX ADMIN — GABAPENTIN 100 MG: 100 CAPSULE ORAL at 11:49

## 2022-07-14 RX ADMIN — BARIUM SULFATE 20 ML: 400 PASTE ORAL at 12:55

## 2022-07-14 NOTE — PROGRESS NOTES
[facility] Medicine Services  PROGRESS NOTE    Patient Name: Desiree Forte  : 1940  MRN: [id number]    Date of Admission: 2022  Primary Care Physician: Kristofer Elder MD    Subjective   Subjective     CC:  hemoptysis    HPI:  Patient pleasantly sitting in chair, eating breakfast. Daughter present as well. No complaints at this present time. Daughter notes she has intermittent pain but none currently. Concerned about her PO intake, trial fluids for a bit.     ROS:  Gen- No fevers, chills  CV- No chest pain, palpitations  Resp- No cough, dyspnea  GI- No N/V/D, abd pain        Objective   Objective     Vital Signs:   Temp:  [96.6 °F (35.9 °C)-97.8 °F (36.6 °C)] 97.6 °F (36.4 °C)  Heart Rate:  [63-79] 68  Resp:  [16-18] 18  BP: ()/(50-69) 98/54     Physical Exam:  GEN: NAD, resting in chair, awake  HEENT: on room air, atraumatic, normocephalic  NECK: supple, no masses  RESP: on room air, normal effort  CV: on tele, sinus rhythm  PSYCH: normal affect, appropriate  NEURO: awake, alert, no focal deficits noted  MSK: no edema noted  SKIN: no rashes noted       Results Reviewed:  LAB RESULTS:      Lab 22  0949   WBC 8.03  --  9.65   HEMOGLOBIN 12.1 12.3 13.0   HEMATOCRIT 36.3 36.7 38.3   PLATELETS 464*  --  461*   NEUTROS ABS 6.40  --  8.04*   IMMATURE GRANS (ABS) 0.04  --  0.03   LYMPHS ABS 0.63*  --  0.50*   MONOS ABS 0.66  --  0.74   EOS ABS 0.27  --  0.28   MCV 87.1  --  85.9   PROCALCITONIN  --   --  0.17   LACTATE  --   --  1.2   PROTIME  --   --  15.0*   APTT  --   --  38.7*   HEPARIN ANTI-XA  --   --  0.10*         Lab 22  0949   SODIUM 142 143   POTASSIUM 4.1 3.8   CHLORIDE 102 101   CO2 33.0* 33.0*   ANION GAP 7.0 9.0   BUN 32* 34*   CREATININE 1.17* 1.33*   EGFR 46.7* 40.0*   GLUCOSE 75 92   CALCIUM 9.4 10.1   HEMOGLOBIN A1C 5.60  --    TSH 11.760*  --          Lab 22  0949   TOTAL PROTEIN 6.5    ALBUMIN 3.30*   GLOBULIN 3.2   ALT (SGPT) 10   AST (SGOT) 27   BILIRUBIN 1.5*   ALK PHOS 89         Lab 07/13/22  0949   PROBNP >70,000.0*   TROPONIN T 0.022   PROTIME 15.0*   INR 1.19*         Lab 07/14/22  0439   CHOLESTEROL 113   LDL CHOL 75   HDL CHOL 15*   TRIGLYCERIDES 123         Lab 07/14/22 0439   ABO TYPING B   RH TYPING Negative   ANTIBODY SCREEN Negative         Brief Urine Lab Results  (Last result in the past 365 days)      Color   Clarity   Blood   Leuk Est   Nitrite   Protein   CREAT   Urine HCG        06/23/22 1012 Yellow   Slightly Cloudy   Negative   Moderate (2+)   Negative   Trace                 Microbiology Results Abnormal     Procedure Component Value - Date/Time    COVID PRE-OP / PRE-PROCEDURE SCREENING ORDER (NO ISOLATION) - Swab, Nasopharynx [[id number]]  (Normal) Collected: 07/13/22 1643    Lab Status: Final result Specimen: Swab from Nasopharynx Updated: 07/13/22 1723    Narrative:      The following orders were created for panel order COVID PRE-OP / PRE-PROCEDURE SCREENING ORDER (NO ISOLATION) - Swab, Nasopharynx.  Procedure                               Abnormality         Status                     ---------                               -----------         ------                     COVID-19 and FLU A/B PCR...[[id number]]  Normal              Final result                 Please view results for these tests on the individual orders.    COVID-19 and FLU A/B PCR - Swab, Nasopharynx [[id number]]  (Normal) Collected: 07/13/22 1643    Lab Status: Final result Specimen: Swab from Nasopharynx Updated: 07/13/22 1723     COVID19 Not Detected     Influenza A PCR Not Detected     Influenza B PCR Not Detected    Narrative:      Fact sheet for providers: https://www.fda.gov/media/804564/download    Fact sheet for patients: https://www.fda.gov/media/555042/download    Test performed by PCR.          XR Chest 1 View    Result Date: 7/13/2022  XR CHEST 1 VW-  Date of Exam: 7/13/2022 10:27 AM   Indication: SOA triage protocol.  Comparison Exams: CT chest from July 11, 2022  Technique: Single AP chest radiograph  FINDINGS: There is a right basilar consolidation and a small right pleural effusion. The heart is enlarged. There is pulmonary vascular congestion. The osseous structures appear intact.      Impression: 1.  Right basilar consolidation, which could represent atelectasis or pneumonia. 2.  Pulmonary vascular congestion with small right pleural effusion. 3.  Cardiomegaly.  This report was finalized on 7/13/2022 10:45 AM by Alfonzo Bunn MD.      Duplex Venous Lower Extremity - Bilateral CAR    Result Date: 7/13/2022  · Sub-acute left lower extremity deep vein thrombosis noted in the common femoral and proximal femoral. · Acute right lower extremity deep vein thrombosis noted in the peroneal. · Acute left lower extremity deep vein thrombosis noted in the peroneal and soleal. · All other veins appeared normal bilaterally.        Results for orders placed during the hospital encounter of 06/02/22    Adult Transthoracic Echo Complete w/ Color, Spectral and Contrast if necessary per protocol    Interpretation Summary  · Left atrial volume is moderately increased.  · Estimated right ventricular systolic pressure from tricuspid regurgitation is mildly elevated (35-45 mmHg).  · Estimated left ventricular EF = 32% Estimated left ventricular EF was in agreement with the calculated left ventricular EF. Left ventricular ejection fraction appears to be 31 - 35%. Left ventricular systolic function is severely decreased.  · The following left ventricular wall segments are hypokinetic: mid anterior, apical anterior, basal anterolateral, mid anterolateral, apical lateral, basal inferolateral, mid inferolateral, apical inferior, mid inferior, apical septal, basal inferoseptal, mid inferoseptal, apex hypokinetic, mid anteroseptal, basal anterior, basal inferior and basal inferoseptal.  · The left ventricular cavity is  mildly dilated.  · The findings are consistent with dilated cardiomyopathy.  · Left ventricular diastolic function is consistent with (grade II w/high LAP) pseudonormalization.  · Moderate to severe mitral valve regurgitation is present with an eccentric jet noted.  · Moderate tricuspid valve regurgitation is present.  · Mild to moderate pulmonary hypertension is present.  · Normal right ventricular cavity size, wall thickness, systolic function and septal motion noted.  · There is no evidence of pericardial effusion.      I have reviewed the medications:  Scheduled Meds:budesonide, 0.5 mg, Nebulization, BID - RT  carvedilol, 6.25 mg, Oral, BID  docusate sodium, 100 mg, Oral, BID  gabapentin, 100 mg, Oral, Q12H  lidocaine, 1 patch, Transdermal, Q24H  sodium chloride, 10 mL, Intravenous, Q12H  spironolactone, 12.5 mg, Oral, Daily  torsemide, 20 mg, Oral, Daily      Continuous Infusions:   PRN Meds:.•  acetaminophen **OR** acetaminophen **OR** acetaminophen  •  guaifenesin-dextromethorphan  •  ondansetron  •  simethicone  •  sodium chloride  •  sodium chloride    Assessment & Plan   Assessment & Plan     Active Hospital Problems    Diagnosis  POA   • Hemoptysis [R04.2]  Yes   • Stage 3b chronic kidney disease (HCC) [N18.32]  Unknown   • 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 difficile diarrhea [A04.72]  Yes   • PVC's (premature ventricular contractions) [I49.3]  Yes   • Essential hypertension [I10]  Yes      Resolved Hospital Problems   No resolved problems to display.        Brief Hospital Course to date:  Ms. Forte is an 83 yo F with PMH of CHF, HLD, PVCs and Metastatic Hepatocellular Carcinoma followed by Dr. Pollock who presented to the ED with hemoptysis.  Pt was found to have small pulmonary emboli.  We were asked to admit for further management.     This patient's problems and plans were partially entered by my partner and updated as appropriate by me  07/14/22.         Hemoptysis  Bilateral Pulmonary Emboli  Acute and chronic DVT   Right basilar consolidation  -- Pulmonary has seen, do not recommend anticoagulation at this point due to hemoptysis  -- monitor H&H  -- suspect right basilar consolidation is infarction vs metastatic mass vs (less likely) infection.  -- defer ABX for time being  -- supportive care, wean supplemental O2 as tolerated  -- consult Palliative for possible Hospice referral  -- check TTE   -- duplex noted with DVT- have asked vascular to see for consideration of filter given finding     HFrEF  -- most recent TTE from June 2022 showed an EF of 32% with diastolic dysfunction noted as well  -- proBNP significantly elevated on admission at 70K but suspect this may due to right heart strain in setting of above  -- will repeat TTE this admission  -- continue home diuretics for time being, monitor closely for HD instability given above     Metastatic HCC  --followed by Dr. Pollock, courtesy consult him. Pt's daughter wanted to hear results of recent workup directly from their team. Appreciate Dr. Pickens  -- consult Palliative as above     Frequent PVCs  -- continue home meds  -- replace electrolytes as indicated    +blood cx  --noted from admission with 1 set +GPC. Favor contaminant but will send repeat      CKD   -- stage IIIb  -- Cr at baseline, monitor    Elevated TSH  --check t4, not on replacement per med rec     DVT prophylaxis:  mechanical  Expected Discharge Location and Transportation: home with   Expected Discharge Date: 7/17    DVT prophylaxis:  Mechanical DVT prophylaxis orders are present.     AM-PAC 6 Clicks Score (PT): 18 (07/14/22 0800)    CODE STATUS:   Code Status and Medical Interventions:   Ordered at: 07/13/22 1345     Medical Intervention Limits:    NO intubation (DNI)    NO cardioversion    NO dialysis    NO antibiotics    NO artificial nutrition    NO vasopressors     Level Of Support Discussed With:    Patient     Code  Status (Patient has no pulse and is not breathing):    No CPR (Do Not Attempt to Resuscitate)     Medical Interventions (Patient has pulse or is breathing):    Limited Support       Sussy Alonso MD  07/14/22

## 2022-07-14 NOTE — PLAN OF CARE
Goal Outcome Evaluation:  Plan of Care Reviewed With: patient, daughter        Progress: no change  Outcome Evaluation: Palliative consult for GOC/ACP and hospice referral and discussion. Pt seen by MALLORY DIAZ; Palliative RN attempted to see X 3, pt with other providers at each attempted encounter. Provided Palliative information and meal discount card. Adjustments to pain management regimen; monitor response. Further GOC discussion after input from other specialty providers.    1330 Palliative IDT meeting: NOVA Tse RN, PN; JIMMY Gilbert LCSW, [facility]; EMILY Duran;  RENETTA Frias DO; JOHNATHAN Jorge, AILYN, CHPN; DUSTIN Sinha, RN, PN; AAMIR Chambers, RN; JESÚS Crisostomo MDiv, [facility]    Problem: Palliative Care  Goal: Enhanced Quality of Life  Outcome: Ongoing, Progressing  Intervention: Promote Advance Care Planning  Flowsheets (Taken 7/14/2022 1357)  Life Transition/Adjustment:   palliative care discussed   palliative care initiated  Intervention: Maximize Comfort  Flowsheets (Taken 7/14/2022 1357)  Pain Management Interventions: (schedule Gabapentin)   pain management plan reviewed with patient/caregiver   other (see comments)  Intervention: Optimize Function  Flowsheets (Taken 7/14/2022 1357)  Fatigue Management: paced activity encouraged  Sleep/Rest Enhancement: family presence promoted  Intervention: Optimize Psychosocial Wellbeing  Flowsheets (Taken 7/14/2022 1357)  Supportive Measures:   active listening utilized   positive reinforcement provided   self-responsibility promoted  Spiritual Activities Assistance: (Spiritual Care consult) other (see comments)  Family/Support System Care:   caregiver stress acknowledged   involvement promoted   self-care encouraged   support provided  Goal: Enhanced Quality of Life  Intervention: Promote Advance Care Planning  Recent Flowsheet Documentation  Taken 7/14/2022 1357 by Alicia Tse RN  Life Transition/Adjustment:   palliative care discussed   palliative care initiated  Intervention:  Maximize Comfort  Recent Flowsheet Documentation  Taken 7/14/2022 1357 by Alicia Tse, RN  Pain Management Interventions: (schedule Gabapentin)   pain management plan reviewed with patient/caregiver   other (see comments)  Intervention: Optimize Function  Recent Flowsheet Documentation  Taken 7/14/2022 1357 by Alicia Tse, RN  Fatigue Management: paced activity encouraged  Sleep/Rest Enhancement: family presence promoted  Intervention: Optimize Psychosocial Wellbeing  Recent Flowsheet Documentation  Taken 7/14/2022 1357 by Alicia Tse, RN  Supportive Measures:   active listening utilized   positive reinforcement provided   self-responsibility promoted  Spiritual Activities Assistance: (Spiritual Care consult) other (see comments)  Family/Support System Care:   caregiver stress acknowledged   involvement promoted   self-care encouraged   support provided

## 2022-07-14 NOTE — PLAN OF CARE
Goal Outcome Evaluation:  Plan of Care Reviewed With: patient        Progress: improving  Outcome Evaluation: Moist sounding cough, non-productive, pt had no episodes of hemoptysis last night. VSS. Voices no complaints or concerns at this time. Will continue with plan of care.

## 2022-07-14 NOTE — THERAPY EVALUATION
Patient Name: Desiree Forte  : 1940    MRN: [id number]                              Today's Date: 2022       Admit Date: 2022    Visit Dx:     ICD-10-CM ICD-9-CM   1. Hemoptysis  R04.2 786.30   2. Bilateral pulmonary embolism (HCC)  I26.99 415.19   3. Cancer, hepatocellular (HCC)  C22.0 155.0   4. Pneumonia of right lower lobe due to infectious organism  J18.9 486     Patient Active Problem List   Diagnosis   • BRUNO (dyspnea on exertion)   • PVC's (premature ventricular contractions)   • Essential hypertension   • At risk for sleep apnea   • C. 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)   • Hemoptysis   • Stage 3b chronic kidney disease (HCC)     Past Medical History:   Diagnosis Date   • Abnormal heart rhythm    • Cancer (HCC)     breast   • Clostridioides difficile diarrhea    • Congestive heart failure (CHF) (HCC)    • Hyperlipidemia    • Irregular heart beat    • Migraine     occular      Past Surgical History:   Procedure Laterality Date   • APPENDECTOMY     • BREAST SURGERY      lumpectomy with lymph nodes left    • COLONOSCOPY     • COLONOSCOPY N/A 2021    Procedure: COLONOSCOPY;  Surgeon: Jesus Morton MD;  Location: [facility] ENDOSCOPY;  Service: Gastroenterology;  Laterality: N/A;   • DIAGNOSTIC LAPAROSCOPY N/A 9/15/2021    Procedure: DIAGNOSTIC LAPAROSCOPY, OMENTAL BIOPSY AND PERITONEAL NODULE BIOPSY;  Surgeon: Nino Jaimes MD;  Location: [facility] OR;  Service: General;  Laterality: N/A;   • ENDOSCOPY N/A 2021    Procedure: ESOPHAGOGASTRODUODENOSCOPY WITH BIOPSY AND POLYPECTOMY;  Surgeon: Jesus Morton MD;  Location: [facility] ENDOSCOPY;  Service: Gastroenterology;  Laterality: N/A;   • EYE SURGERY Bilateral     cataract   • HYSTERECTOMY        General Information     Row Name 22 1132          OT Time and Intention    Document Type evaluation  -HK     Mode of Treatment occupational therapy  -HK      Row Name 07/14/22 1132          General Information    Patient Profile Reviewed yes  -     Prior Level of Function independent:;all household mobility;community mobility;gait;transfer;bed mobility;ADL's  -     Existing Precautions/Restrictions fall  -HK     Barriers to Rehab medically complex;previous functional deficit  -     Row Name 07/14/22 1132          Living Environment    People in Home significant other  -     Row Name 07/14/22 1132          Home Main Entrance    Number of Stairs, Main Entrance none  -HK     Stair Railings, Main Entrance none  -HK     Row Name 07/14/22 1132          Stairs Within Home, Primary    Number of Stairs, Within Home, Primary none  -HK     Stair Railings, Within Home, Primary none  -HK     Row Name 07/14/22 1132          Cognition    Orientation Status (Cognition) oriented x 4  -HK     Row Name 07/14/22 1132          Safety Issues, Functional Mobility    Safety Issues Affecting Function (Mobility) safety precautions follow-through/compliance;safety precaution awareness;problem-solving  -     Impairments Affecting Function (Mobility) balance;motor planning  -           User Key  (r) = Recorded By, (t) = Taken By, (c) = Cosigned By    Initials Name Provider Type     Obdulia Collier, OT Occupational Therapist                 Mobility/ADL's     Row Name 07/14/22 1134          Bed Mobility    Comment, (Bed Mobility) Received and left [facility].  -     Row Name 07/14/22 1134          Transfers    Transfers sit-stand transfer  -     Sit-Stand [place] (Transfers) independent  -     Row Name 07/14/22 1134          Sit-Stand Transfer    Assistive Device (Sit-Stand Transfers) --  none  -     Row Name 07/14/22 1134          Functional Mobility    Functional Mobility- Ind. Level contact guard assist;verbal cues required  -     Functional Mobility- Comment noted reaching for furniture during ambulation. Educated pt she may benefit from SPC use for suppport.  -[facility]  Name 07/14/22 1134          Activities of Daily Living    BADL Assessment/Intervention lower body dressing  -     Row Name 07/14/22 1134          Lower Body Dressing Assessment/Training    [place] Level (Lower Body Dressing) don;socks;independent  -HK     Position (Lower Body Dressing) unsupported sitting  -     Comment, (Lower Body Dressing) extra time/effort.  -           User Key  (r) = Recorded By, (t) = Taken By, (c) = Cosigned By    Initials Name Provider Type     Obdulia Collier, OT Occupational Therapist               Obj/Interventions     Row Name 07/14/22 1135          Sensory Assessment (Somatosensory)    Sensory Assessment (Somatosensory) sensation intact  -[facility] Name 07/14/22 1135          Range of Motion Comprehensive    General Range of Motion no range of motion deficits identified  -     Comment, General Range of Motion B UE WNL  -[facility] Name 07/14/22 1135          Strength Comprehensive (MMT)    General Manual Muscle Testing (MMT) Assessment upper extremity strength deficits identified  -[facility] Name 07/14/22 1135          Upper Extremity (Manual Muscle Testing)    Upper Extremity: Manual Muscle Testing (MMT) right shoulder strength deficit;left shoulder strength deficit  -     Row Name 07/14/22 1135          Balance    Balance Assessment sitting static balance;sitting dynamic balance;standing static balance;standing dynamic balance  -     Static Sitting Balance independent  -HK     Dynamic Sitting Balance independent  -HK     Static Standing Balance contact guard  -HK     Dynamic Standing Balance contact guard  -HK     Position/Device Used, Standing Balance unsupported  -     Balance Interventions sitting;occupation based/functional task  -     Row Name 07/14/22 1135          Right Shoulder (Manual Muscle Testing)    Right Shoulder Manual Muscle Testing (MMT) flexion  -     MMT: Flexion, Right Shoulder flexion  -     MMT, Gross Movement: Right Shoulder Flexion  (3+/5) fair plus  -HK     [facility] Name 07/14/22 1135          Left Shoulder (Manual Muscle Testing)    Left Shoulder Manual Muscle Testing (MMT) flexion  -HK     MMT: Flexion, Left Shoulder flexion  -HK     MMT, Gross Movement: Left Shoulder Flexion (3+/5) fair plus  -HK           User Key  (r) = Recorded By, (t) = Taken By, (c) = Cosigned By    Initials Name Provider Type     Obdulia Collier, OT Occupational Therapist               Goals/Plan    No documentation.                Clinical Impression     [facility] Name 07/14/22 1138          Pain Assessment    Pretreatment Pain Rating 0/10 - no pain  -HK     Posttreatment Pain Rating 0/10 - no pain  -HK     [facility] Name 07/14/22 1138          Plan of Care Review    Plan of Care Reviewed With patient  -HK     Progress improving  -     Outcome Evaluation OT eval complete. Pt is pleasent and cooperative. Transfers independently and completes LBD independently. Pt requres CGA for ambulation due to noted reaching for furniture. Pt educated on concerns for falls and to consider use of SPC for balance. At this time no deficits noted that require skilled OT services however pt will benefit from PT eval.  -[facility] Name 07/14/22 1138          Therapy Assessment/Plan (OT)    Patient/Family Therapy Goal Statement (OT) Pt would like to improve and return home.  -HK     Rehab Potential (OT) good, to achieve stated therapy goals  -     Criteria for Skilled Therapeutic Interventions Met (OT) yes;skilled treatment is necessary  -     Therapy Frequency (OT) daily  -     Row Name 07/14/22 1138          Therapy Plan Review/Discharge Plan (OT)    Anticipated Discharge Disposition (OT) home with assist  -[facility] Name 07/14/22 1138          Vital Signs    Pre Systolic BP Rehab --  RN cleared for tx; VSS  -HK     O2 Delivery Pre Treatment room air  -HK     O2 Delivery Intra Treatment room air  -HK     O2 Delivery Post Treatment room air  -HK     Pre Patient Position Sitting  -HK     Intra  Patient Position Standing  -HK     Post Patient Position Sitting  -HK     Row Name 07/14/22 1138          Positioning and Restraints    Pre-Treatment Position sitting in chair/recliner  -HK     Post Treatment Position chair  -HK     In Chair notified nsg;reclined;call light within reach;encouraged to call for assist  -HK           User Key  (r) = Recorded By, (t) = Taken By, (c) = Cosigned By    Initials Name Provider Type    HK Obdulia Collier, OT Occupational Therapist               Outcome Measures     Row Name 07/14/22 1143          How much help from another is currently needed...    Putting on and taking off regular lower body clothing? 4  -HK     Bathing (including washing, rinsing, and drying) 4  -HK     Toileting (which includes using toilet bed pan or urinal) 4  -HK     Putting on and taking off regular upper body clothing 4  -HK     Taking care of personal grooming (such as brushing teeth) 4  -HK     Eating meals 4  -HK     AM-PAC 6 Clicks Score (OT) 24  -HK     Row Name 07/14/22 0800          How much help from another person do you currently need...    Turning from your back to your side while in flat bed without using bedrails? 3  -MR     Moving from lying on back to sitting on the side of a flat bed without bedrails? 3  -MR     Moving to and from a bed to a chair (including a wheelchair)? 3  -MR     Standing up from a chair using your arms (e.g., wheelchair, bedside chair)? 3  -MR     Climbing 3-5 steps with a railing? 3  -MR     To walk in hospital room? 3  -MR     AM-PAC 6 Clicks Score (PT) 18  -MR     Highest level of mobility 6 --> Walked 10 steps or more  -MR     Row Name 07/14/22 1143          Functional Assessment    Outcome Measure Options AM-PAC 6 Clicks Daily Activity (OT)  -HK           User Key  (r) = Recorded By, (t) = Taken By, (c) = Cosigned By    Initials Name Provider Type    Obdulia Levy, TEVIN Occupational Therapist    Cande Patel, RN Registered Nurse                Occupational  Therapy Education                 Title: PT OT SLP Therapies (In Progress)     Topic: Occupational Therapy (In Progress)     Point: ADL training (Done)     Description:   Instruct learner(s) on proper safety adaptation and remediation techniques during self care or transfers.   Instruct in proper use of assistive devices.              Learning Progress Summary           Patient Acceptance, E,TB,D, VU,NR by  at 7/14/2022 1144    Acceptance, E,TB,D, VU,NR by  at 7/14/2022 1144                   Point: Home exercise program (Not Started)     Description:   Instruct learner(s) on appropriate technique for monitoring, assisting and/or progressing therapeutic exercises/activities.              Learner Progress:  Not documented in this visit.          Point: Precautions (Done)     Description:   Instruct learner(s) on prescribed precautions during self-care and functional transfers.              Learning Progress Summary           Patient Acceptance, E,TB,D, VU,NR by  at 7/14/2022 1144    Acceptance, E,TB,D, VU,NR by  at 7/14/2022 1144                   Point: Body mechanics (Done)     Description:   Instruct learner(s) on proper positioning and spine alignment during self-care, functional mobility activities and/or exercises.              Learning Progress Summary           Patient Acceptance, E,TB,D, VU,NR by  at 7/14/2022 1144    Acceptance, E,TB,D, VU,NR by  at 7/14/2022 1144                               User Key     Initials Effective Dates Name Provider Type Discipline     06/16/21 -  Obdulia Collier, OT Occupational Therapist OT              OT Recommendation and Plan  Therapy Frequency (OT): daily  Plan of Care Review  Plan of Care Reviewed With: patient  Progress: improving  Outcome Evaluation: OT eval complete. Pt is pleasent and cooperative. Transfers independently and completes LBD independently. Pt requres CGA for ambulation due to noted reaching for furniture. Pt educated on concerns for falls  and to consider use of SPC for balance. At this time no deficits noted that require skilled OT services however pt will benefit from PT eval.     Time Calculation:    Time Calculation- OT     Row Name 07/14/22 1049             Time Calculation- OT    OT Start Time 1049  -HK      OT Received On 07/14/22  -HK      OT Goal Re-Cert Due Date 07/24/22  -HK              Untimed Charges    OT Eval/Re-eval Minutes 56  -HK              Total Minutes    Untimed Charges Total Minutes 56  -HK       Total Minutes 56  -HK            User Key  (r) = Recorded By, (t) = Taken By, (c) = Cosigned By    Initials Name Provider Type    HK Obdulia Collier, OT Occupational Therapist              Therapy Charges for Today     Code Description Service Date Service Provider Modifiers Qty    [id number] HC OT EVAL LOW COMPLEXITY 4 7/14/2022 Obdulia Collier OT GO 1               Obdulia Collier OT  7/14/2022

## 2022-07-14 NOTE — PLAN OF CARE
Goal Outcome Evaluation:  Plan of Care Reviewed With: patient        Progress: improving  Outcome Evaluation: OT eval complete. Pt is pleasent and cooperative. Transfers independently and completes LBD independently. Pt requres CGA for ambulation due to noted reaching for furniture. Pt educated on concerns for falls and to consider use of SPC for balance. At this time no deficits noted that require skilled OT services however pt will benefit from PT eval.

## 2022-07-14 NOTE — CONSULTS
HEMATOLOGY/ONCOLOGY INPATIENT CONSULTATION      REFERRING PHYSICIAN: Sussy Alonso MD    PRIMARY CARE PROVIDER: Kristofer Elder MD    REASON FOR CONSULTATION: Progressive hepatocellular carcinoma and pulmonary emboli with hemoptysis      HISTORY OF PRESENT ILLNESS: 82-year-old lady with hepatocellular carcinoma that appears to be progressive on Avastin and Tecentriq presents to the hospital with hemoptysis after being diagnosed with pulmonary emboli.  Her emboli are fairly small.  Though she has an area of infarct in her lung.  She has been on Avastin with Tecentriq since October 2021.  She has had C. difficile colitis that was difficult to treat.  She reports that she is feeling a little better today.  Denies any significant issues with pain.  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  She underwent an ENT evaluation recently.    Allergies   Allergen Reactions   • Metformin Diarrhea   • Statins Myalgia     Muscle pain       Past Medical History:   Diagnosis Date   • Abnormal heart rhythm    • Cancer (HCC)     breast   • Clostridioides difficile diarrhea    • Congestive heart failure (CHF) (HCC)    • Hyperlipidemia    • Irregular heart beat    • Migraine     occular          Current Facility-Administered Medications:   •  acetaminophen (TYLENOL) tablet 650 mg, 650 mg, Oral, Q4H PRN **OR** acetaminophen (TYLENOL) 160 MG/5ML solution 650 mg, 650 mg, Oral, Q4H PRN **OR** acetaminophen (TYLENOL) suppository 650 mg, 650 mg, Rectal, Q4H PRN, Sirisha Morton MD  •  budesonide (PULMICORT) nebulizer solution 0.5 mg, 0.5 mg, Nebulization, BID - RT, Sirisha Morton MD, 0.5 mg at 07/13/22 1919  •  carvedilol (COREG) tablet 6.25 mg, 6.25 mg, Oral, BID, Sirisha Morton MD, 6.25 mg at 07/14/22 0800  •  docusate sodium (COLACE) capsule 100 mg, 100 mg, Oral, BID, Cinthya Foster APRN, 100 mg at 07/14/22 1149  •  gabapentin (NEURONTIN) capsule 100 mg, 100  mg, Oral, Q12H, Naun Frias DO, 100 mg at 07/14/22 1149  •  guaifenesin-dextromethorphan (MUCINEX DM) tablet 1 tablet, 1 tablet, Oral, BID PRN, Cinthya Foster, APRN  •  lidocaine (LIDODERM) 5 % 1 patch, 1 patch, Transdermal, Q24H, Dillon Fostera E, APRN  •  ondansetron (ZOFRAN) tablet 8 mg, 8 mg, Oral, Q8H PRN, Sirisha Morton MD  •  simethicone (MYLICON) chewable tablet 80 mg, 80 mg, Oral, 4x Daily PRN, Cinthya Foster, APRN  •  sodium chloride 0.9 % flush 1-10 mL, 1-10 mL, Intravenous, PRN, Sirisha Morton MD  •  sodium chloride 0.9 % flush 10 mL, 10 mL, Intravenous, PRN, Alfonzo Ortega MD  •  sodium chloride 0.9 % flush 10 mL, 10 mL, Intravenous, Q12H, Sirisha Morton MD, 10 mL at 07/13/22 2134  •  sodium chloride 0.9 % infusion, 50 mL/hr, Intravenous, Continuous, Sussy Alonso MD  •  spironolactone (ALDACTONE) tablet 12.5 mg, 12.5 mg, Oral, Daily, Sirisha Morton MD, 12.5 mg at 07/14/22 0800  •  torsemide (DEMADEX) tablet 20 mg, 20 mg, Oral, Daily, Sirisha Motron MD, 20 mg at 07/14/22 0800    Past Surgical History:   Procedure Laterality Date   • APPENDECTOMY     • BREAST SURGERY      lumpectomy with lymph nodes left    • COLONOSCOPY     • COLONOSCOPY N/A 9/7/2021    Procedure: COLONOSCOPY;  Surgeon: Jesus Morton MD;  [facility];  Service: Gastroenterology;  Laterality: N/A;   • DIAGNOSTIC LAPAROSCOPY N/A 9/15/2021    Procedure: DIAGNOSTIC LAPAROSCOPY, OMENTAL BIOPSY AND PERITONEAL NODULE BIOPSY;  Surgeon: Nino Jaimes MD;  [facility];  Service: General;  Laterality: N/A;   • ENDOSCOPY N/A 9/7/2021    Procedure: ESOPHAGOGASTRODUODENOSCOPY WITH BIOPSY AND POLYPECTOMY;  Surgeon: Jesus Morton MD;  [facility];  Service: Gastroenterology;  Laterality: N/A;   • EYE SURGERY Bilateral     cataract   • HYSTERECTOMY         Social History     Socioeconomic History   • Marital status:    Tobacco Use   •  Smoking status: Never Smoker   • Smokeless tobacco: Never Used   Vaping Use   • Vaping Use: Never used   Substance and Sexual Activity   • Alcohol use: Not Currently   • Drug use: Never   • Sexual activity: Defer       Family History   Problem Relation Age of Onset   • Leukemia Mother    • Stroke Father    • Colon cancer Neg Hx    • Colon polyps Neg Hx    • Esophageal cancer Neg Hx        Oncology/Hematology History   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    Review of Systems   Constitutional: Positive for appetite change and fatigue.   HENT:   Positive for sore throat.    Eyes: Negative.    Respiratory: Positive for hemoptysis.    Cardiovascular: Negative.    Gastrointestinal: Negative.    Endocrine: Negative.    Genitourinary: Negative.     Skin: Negative.    Neurological: Positive for extremity weakness.   Hematological: Negative.    Psychiatric/Behavioral: Negative.          Objective     Vitals:    07/13/22 2355 07/14/22 0353 07/14/22 0652 07/14/22 1100   BP: 114/57 122/69 101/67 98/54   BP Location: Right arm Right arm Right arm Right arm   Patient Position: Lying Lying Lying Sitting   Pulse: 69 72 69 68   Resp: 16 18 18 18   Temp: 97.4 °F (36.3 °C) 97 °F (36.1 °C) 96.6 °F (35.9 °C) 97.6 °F (36.4 °C)   TempSrc: Oral Oral Oral Oral   SpO2: 94% 93% 96% 97%   Weight:       Height:                       Temp:  [96.6 °F (35.9 °C)-97.8 °F (36.6 °C)] 97.6 °F (36.4 °C)     Performance Status: 2    Physical Exam    General: frail appearing female in no acute distress  HEENT: sclerae anicteric, oropharynx clear  Lymphatics: no cervical, supraclavicular,  or axillary adenopathy  Cardiovascular: regular rate and rhythm, no murmurs  Lungs: clear to auscultation bilaterally  Abdomen: distended abdomen  Extremities: no lower extremity edema  Skin: no rashes, lesions, bruising, or petechiae      LABS:    Lab Results   Component Value Date    HGB 12.1 07/14/2022    HCT 36.3 07/14/2022    MCV 87.1 07/14/2022     (H) 07/14/2022    WBC 8.03 07/14/2022    NEUTROABS 6.40 07/14/2022    LYMPHSABS 0.63 (L) 07/14/2022    MONOSABS 0.66 07/14/2022    EOSABS 0.27 07/14/2022    BASOSABS 0.03 07/14/2022     Lab Results   Component Value Date    GLUCOSE 75 07/14/2022    BUN 32 (H) 07/14/2022    CREATININE 1.17 (H) 07/14/2022     07/14/2022    K 4.1 07/14/2022     07/14/2022    CO2 33.0 (H) 07/14/2022    CALCIUM 9.4 07/14/2022    PROTEINTOT 6.5 07/13/2022    ALBUMIN 3.30 (L) 07/13/2022    BILITOT 1.5 (H) 07/13/2022    ALKPHOS 89 07/13/2022    AST 27 07/13/2022    ALT 10 07/13/2022         IMAGING    CT Chest With Contrast Diagnostic    Result Date: 7/11/2022  DATE OF EXAM: 7/11/2022 1:58 PM  PROCEDURE: CT CHEST W CONTRAST DIAGNOSTIC-, CT ABDOMEN PELVIS W CONTRAST-  INDICATIONS: R05.3; R05.3-Chronic cough  COMPARISON: 11/22/2021 chest, abdomen and pelvis CT scans.  TECHNIQUE: Routine transaxial slices were obtained through the chest after the intravenous administration of 100 mL of Isovue 300. Reconstructed coronal and sagittal images were also obtained. Automated exposure control and iterative construction methods were used.  The radiation dose reduction device was turned on for each scan per the ALARA (As Low as Reasonably Achievable) protocol.  FINDINGS: Patient history indicates hepatocellular carcinoma metastatic to peritoneum. Previous exam report from 11/22/2021 indicates stable appearance of the chest, abdomen pelvis with cirrhotic hepatic morphology, extensive metastatic peritoneal disease and trace ascites.  CT SCAN OF THE CHEST WITH IV CONTRAST: Compared to  the 11/22/2021 exam, there are a few shotty mediastinal lymph nodes which appear stable. No pericardial effusion is seen but there is a new, moderate free-flowing right pleural effusion and new but minimal left pleural effusion. Lungs show new multifocal disease, including an irregularly masslike area in the right lateral costophrenic angle, extending over a roughly 7 x 3 cm area, some adjacent reticular or reticulonodular disease in the right middle lobe, similar pleural-based masslike 2.5 cm opacity in the medial left upper lobe, and scattered areas of nodular and reticular disease, worrisome for irregular pulmonary metastases. Some superimposed component of atypical infection could be present. There actually appears to be a small embolus in the right lower lobe segmental vessel associated with the area of dense right lower lobe disease, and this particular lesion may represent a pulmonary infarct. Please refer to axial image 48 through image 53 series 2. There are small bilateral segmental and subsegmental emboli of both lower lobes elsewhere. No large or saddle embolus is seen. No potential embolic disease is identified elsewhere.      1. Interval development of a small-to-moderate right pleural effusion and minimal left pleural effusion. 2. Multiple small bilateral lower lobe pulmonary emboli, the largest of which appears to be associated with a 7 x 3 cm lateral right lower lobe infarct. 3. Ill-defined multifocal nodular and reticular disease of lungs elsewhere are concerning for infiltrating metastases, less likely atypical pneumonia.    Note: Preliminary findings were called to Dr. Pollock at approximately 5:20 PM 07/11/2022.    CT SCAN OF THE ABDOMEN PELVIS WITH IV CONTRAST: As on the prior study, there is diffuse fatty liver change. Today's exam shows a very small focus of subcapsular liver enhancement in the inferior right liver lobe, nonspecific, and essentially unchanged from the prior study,  possibly incidental to the patient's known disease. No other hepatic lesions are identified. Spleen is not enlarged. No significant abnormalities are appreciated of the pancreas, adrenal glands, or kidneys except for renal cortical thinning, not unusual for the patient's age. Gallbladder is contracted but otherwise unremarkable.  The patient's advanced nodular peritoneal disease is overall relatively similar in distribution to the prior study, but with evidence of moderate gradual progression, including increasingly dense masslike anterior omental disease. No significant retroperitoneal lymphadenopathy is seen. There is little evidence of adenopathy in the small bowel mesentery. Bowel loops are normal in caliber. 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 Bladder is decompressed. Bony structures appear to be intact. Delayed venous phase images show no evidence of obstructive uropathy.  IMPRESSION:  1. Advanced, extensive peritoneal metastatic disease, with moderate general progression since 11/22/2021 exam. No evidence of intrinsic solid organ disease and no obvious retroperitoneal kortney disease. 2. Mild-to-moderate ascites, increased from prior study.  This report was finalized on 7/11/2022 10:02 PM by Dr. Caleb Trevino MD.      CT Abdomen Pelvis With Contrast    Result Date: 7/11/2022  DATE OF EXAM: 7/11/2022 1:58 PM  PROCEDURE: CT CHEST W CONTRAST DIAGNOSTIC-, CT ABDOMEN PELVIS W CONTRAST-  INDICATIONS: R05.3; R05.3-Chronic cough  COMPARISON: 11/22/2021 chest, abdomen and pelvis CT scans.  TECHNIQUE: Routine transaxial slices were obtained through the chest after the intravenous administration of 100 mL of Isovue 300. Reconstructed coronal and sagittal images were also obtained. Automated exposure control and iterative construction methods were used.  The radiation dose reduction device was turned on for each scan per the ALARA (As Low as Reasonably Achievable) protocol.  FINDINGS: Patient history  indicates hepatocellular carcinoma metastatic to peritoneum. Previous exam report from 11/22/2021 indicates stable appearance of the chest, abdomen pelvis with cirrhotic hepatic morphology, extensive metastatic peritoneal disease and trace ascites.  CT SCAN OF THE CHEST WITH IV CONTRAST: Compared to the 11/22/2021 exam, there are a few shotty mediastinal lymph nodes which appear stable. No pericardial effusion is seen but there is a new, moderate free-flowing right pleural effusion and new but minimal left pleural effusion. Lungs show new multifocal disease, including an irregularly masslike area in the right lateral costophrenic angle, extending over a roughly 7 x 3 cm area, some adjacent reticular or reticulonodular disease in the right middle lobe, similar pleural-based masslike 2.5 cm opacity in the medial left upper lobe, and scattered areas of nodular and reticular disease, worrisome for irregular pulmonary metastases. Some superimposed component of atypical infection could be present. There actually appears to be a small embolus in the right lower lobe segmental vessel associated with the area of dense right lower lobe disease, and this particular lesion may represent a pulmonary infarct. Please refer to axial image 48 through image 53 series 2. There are small bilateral segmental and subsegmental emboli of both lower lobes elsewhere. No large or saddle embolus is seen. No potential embolic disease is identified elsewhere.      1. Interval development of a small-to-moderate right pleural effusion and minimal left pleural effusion. 2. Multiple small bilateral lower lobe pulmonary emboli, the largest of which appears to be associated with a 7 x 3 cm lateral right lower lobe infarct. 3. Ill-defined multifocal nodular and reticular disease of lungs elsewhere are concerning for infiltrating metastases, less likely atypical pneumonia.    Note: Preliminary findings were called to Dr. Pollock at approximately  5:20 PM 07/11/2022.    CT SCAN OF THE ABDOMEN PELVIS WITH IV CONTRAST: As on the prior study, there is diffuse fatty liver change. Today's exam shows a very small focus of subcapsular liver enhancement in the inferior right liver lobe, nonspecific, and essentially unchanged from the prior study, possibly incidental to the patient's known disease. No other hepatic lesions are identified. Spleen is not enlarged. No significant abnormalities are appreciated of the pancreas, adrenal glands, or kidneys except for renal cortical thinning, not unusual for the patient's age. Gallbladder is contracted but otherwise unremarkable.  The patient's advanced nodular peritoneal disease is overall relatively similar in distribution to the prior study, but with evidence of moderate gradual progression, including increasingly dense masslike anterior omental disease. No significant retroperitoneal lymphadenopathy is seen. There is little evidence of adenopathy in the small bowel mesentery. Bowel loops are normal in caliber. 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 Bladder is decompressed. Bony structures appear to be intact. Delayed venous phase images show no evidence of obstructive uropathy.  IMPRESSION:  1. Advanced, extensive peritoneal metastatic disease, with moderate general progression since 11/22/2021 exam. No evidence of intrinsic solid organ disease and no obvious retroperitoneal kortney disease. 2. Mild-to-moderate ascites, increased from prior study.  This report was finalized on 7/11/2022 10:02 PM by Dr. Caleb Trevino MD.      XR Chest 1 View    Result Date: 7/13/2022  XR CHEST 1 VW-  Date of Exam: 7/13/2022 10:27 AM  Indication: SOA triage protocol.  Comparison Exams: CT chest from July 11, 2022  Technique: Single AP chest radiograph  FINDINGS: There is a right basilar consolidation and a small right pleural effusion. The heart is enlarged. There is pulmonary vascular congestion. The osseous structures appear  intact.      1.  Right basilar consolidation, which could represent atelectasis or pneumonia. 2.  Pulmonary vascular congestion with small right pleural effusion. 3.  Cardiomegaly.  This report was finalized on 7/13/2022 10:45 AM by Alfonzo Bunn MD.      Duplex Venous Lower Extremity - Bilateral CAR    Result Date: 7/13/2022  · Sub-acute left lower extremity deep vein thrombosis noted in the common femoral and proximal femoral. · Acute right lower extremity deep vein thrombosis noted in the peroneal. · Acute left lower extremity deep vein thrombosis noted in the peroneal and soleal. · All other veins appeared normal bilaterally.        ASSESSMENT/PLAN:    1.  Pulmonary emboli with blood clots in her lower extremity with hemostasis.  Unfortunately this patient is in a difficult situation.  She is not a candidate for anticoagulation at this time due to the persistent hemoptysis.  May be related to her Avastin.  However placing an IVC filter may complicate her care significantly.  Her risk of having IVC clot would be fairly high since she has fairly high risk for having progression of her blood clots.  After discussing the risk and benefit of treatment with understanding that her blood clots can be fatal, we have decided against proceeding with an IVC filter at this time.  I will likely start her on anticoagulation once hemoptysis resolved in couple of weeks as an outpatient and start her at a lower dose of Eliquis at 2.5 mg twice daily.    2.  Progressive hepatocellular carcinoma.  I discussed with her the findings of her CAT scan and my discussion with radiology.  She has slow progressive disease since her scans in December.  I feel that treating her with Avastin and Tecentriq has run its course at this point.  She wants to consider second line therapy and functionally she is okay at this time.  I have recommended single agent lenvatinib in the situation.  If she does have intolerance issues or progression with  this then I will place her on hospice care.        Primitivo Pollock MD    7/14/2022

## 2022-07-14 NOTE — CONSULTS
[facility] In-Patient Consult    Name:  Desiree Forte  MRN Number:  [id number]  Date of Admission:  7/13/2022  Date of Consultation: 7/14/2022    Consulting Provider:    PCP: Kristofer Elder MD  IP Care Team:  Patient Care Team:  Kristofer Elder MD as PCP - General (Family Medicine)  Carlyle Saucedo MD as Consulting Physician (Cardiology)  Primitivo Pollock MD as Consulting Physician (Hematology)    Reason for Consultation: IVC filter    History of Present Illness:    Desiree Forte is a 82 y.o. female with a history of breast cancer, chronic C. difficile, congestive heart failure, PVCs, hyperlipidemia, hypothyroidism migraines, and metastatic hepatocellular carcinoma followed by Dr. Pollock (onAvastin and Tecentriq).  She presented to [facility] ER yesterday for hemoptysis and hypoxia-74%.  Labs significant on admission: proBNP 70,000, TSH 11.76, and PT/INR 15.0/1.19. She was recently seen this week and had a CT chest performed due to cough and dyspnea which showed bilateral pulmonary embolisms and was placed on Eliquis but she had not filled her prescription yet. She denies any chest pain or shortness of breath currently, on room air. Has not had any more hemoptysis since admission.  Duplex revealed acute DVTs in the left common femoral, proximal femoral, peroneal and soleal, and right peroneal DVT, our service was consulted for consideration of IVC filter.     Review of Systems:  Review of Systems   Constitutional: Positive for fatigue.   Eyes: Negative.    Respiratory: Positive for cough and shortness of breath.    Cardiovascular: Negative.    Gastrointestinal: Negative.    Endocrine: Negative.    Genitourinary: Negative.    Musculoskeletal: Negative.    Skin: Negative.    Allergic/Immunologic: Negative.    Neurological: Negative.    Psychiatric/Behavioral: Negative.        Past Medical History:    Past Medical History:   Diagnosis Date   • Abnormal heart rhythm     • Cancer (HCC)     breast   • Clostridioides difficile diarrhea    • Congestive heart failure (CHF) (HCC)    • Hyperlipidemia    • Irregular heart beat    • Migraine     occular        Past Surgical History:    Past Surgical History:   Procedure Laterality Date   • APPENDECTOMY     • BREAST SURGERY      lumpectomy with lymph nodes left    • COLONOSCOPY     • COLONOSCOPY N/A 9/7/2021    Procedure: COLONOSCOPY;  Surgeon: Jesus Morton MD;  Location:  [facility];  Service: Gastroenterology;  Laterality: N/A;   • DIAGNOSTIC LAPAROSCOPY N/A 9/15/2021    Procedure: DIAGNOSTIC LAPAROSCOPY, OMENTAL BIOPSY AND PERITONEAL NODULE BIOPSY;  Surgeon: Nino Jaimes MD;  Location:  [facility];  Service: General;  Laterality: N/A;   • ENDOSCOPY N/A 9/7/2021    Procedure: ESOPHAGOGASTRODUODENOSCOPY WITH BIOPSY AND POLYPECTOMY;  Surgeon: Jesus Morton MD;  Location:  [facility];  Service: Gastroenterology;  Laterality: N/A;   • EYE SURGERY Bilateral     cataract   • HYSTERECTOMY         Family History:    Family History   Problem Relation Age of Onset   • Leukemia Mother    • Stroke Father    • Colon cancer Neg Hx    • Colon polyps Neg Hx    • Esophageal cancer Neg Hx        Social History:    Social History     Socioeconomic History   • Marital status:    Tobacco Use   • Smoking status: Never Smoker   • Smokeless tobacco: Never Used   Vaping Use   • Vaping Use: Never used   Substance and Sexual Activity   • Alcohol use: Not Currently   • Drug use: Never   • Sexual activity: Defer       Allergies:  Allergies   Allergen Reactions   • Metformin Diarrhea   • Statins Myalgia     Muscle pain         Physical Exam:  Vital Signs:    Temp:  [96.6 °F (35.9 °C)-98.5 °F (36.9 °C)] 96.6 °F (35.9 °C)  Heart Rate:  [61-79] 69  Resp:  [16-18] 18  BP: (100-125)/(50-69) 101/67  Body mass index is 17.16 kg/m².     Physical Exam  Constitutional:       Appearance: Normal appearance.   HENT:      Head: Normocephalic.       Mouth/Throat:      Pharynx: Oropharynx is clear.   Eyes:      Pupils: Pupils are equal, round, and reactive to light.   Cardiovascular:      Rate and Rhythm: Normal rate and regular rhythm.      Pulses: Normal pulses.   Pulmonary:      Effort: Pulmonary effort is normal.      Breath sounds: Normal breath sounds.   Abdominal:      General: Bowel sounds are normal.   Musculoskeletal:         General: Normal range of motion.      Cervical back: Normal range of motion.   Skin:     General: Skin is warm and dry.   Neurological:      General: No focal deficit present.      Mental Status: She is alert and oriented to person, place, and time.   Psychiatric:         Mood and Affect: Mood normal.         Behavior: Behavior normal.         Labs/Imaging/Procedures:   Results from last 7 days   Lab Units 07/14/22  0439 07/13/22  0949   SODIUM mmol/L 142 143   POTASSIUM mmol/L 4.1 3.8   CHLORIDE mmol/L 102 101   CO2 mmol/L 33.0* 33.0*   BUN mg/dL 32* 34*   CREATININE mg/dL 1.17* 1.33*   CALCIUM mg/dL 9.4 10.1   BILIRUBIN mg/dL  --  1.5*   ALK PHOS U/L  --  89   ALT (SGPT) U/L  --  10   AST (SGOT) U/L  --  27   GLUCOSE mg/dL 75 92     Results from last 7 days   Lab Units 07/13/22  0949   TROPONIN T ng/mL 0.022     Results from last 7 days   Lab Units 07/14/22  0439 07/13/22 2047 07/13/22  0949   WBC 10*3/mm3 8.03  --  9.65   HEMOGLOBIN g/dL 12.1 12.3 13.0   HEMATOCRIT % 36.3 36.7 38.3   PLATELETS 10*3/mm3 464*  --  461*     Results from last 7 days   Lab Units 07/13/22  0949   INR  1.19*   APTT seconds 38.7*         Results from last 7 days   Lab Units 07/14/22  0439   CHOLESTEROL mg/dL 113   TRIGLYCERIDES mg/dL 123   HDL CHOL mg/dL 15*   LDL CHOL mg/dL 75     CT Chest With Contrast Diagnostic    Result Date: 7/11/2022  1. Interval development of a small-to-moderate right pleural effusion and minimal left pleural effusion. 2. Multiple small bilateral lower lobe pulmonary emboli, the largest of which appears to be associated  with a 7 x 3 cm lateral right lower lobe infarct. 3. Ill-defined multifocal nodular and reticular disease of lungs elsewhere are concerning for infiltrating metastases, less likely atypical pneumonia.    Note: Preliminary findings were called to Dr. Pollock at approximately 5:20 PM 07/11/2022.    CT SCAN OF THE ABDOMEN PELVIS WITH IV CONTRAST: As on the prior study, there is diffuse fatty liver change. Today's exam shows a very small focus of subcapsular liver enhancement in the inferior right liver lobe, nonspecific, and essentially unchanged from the prior study, possibly incidental to the patient's known disease. No other hepatic lesions are identified. Spleen is not enlarged. No significant abnormalities are appreciated of the pancreas, adrenal glands, or kidneys except for renal cortical thinning, not unusual for the patient's age. Gallbladder is contracted but otherwise unremarkable.  The patient's advanced nodular peritoneal disease is overall relatively similar in distribution to the prior study, but with evidence of moderate gradual progression, including increasingly dense masslike anterior omental disease. No significant retroperitoneal lymphadenopathy is seen. There is little evidence of adenopathy in the small bowel mesentery. Bowel loops are normal in caliber. 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 Bladder is decompressed. Bony structures appear to be intact. Delayed venous phase images show no evidence of obstructive uropathy.  IMPRESSION:  1. Advanced, extensive peritoneal metastatic disease, with moderate general progression since 11/22/2021 exam. No evidence of intrinsic solid organ disease and no obvious retroperitoneal kortney disease. 2. Mild-to-moderate ascites, increased from prior study.  This report was finalized on 7/11/2022 10:02 PM by Dr. Caleb Trevino MD.      CT Abdomen Pelvis With Contrast    Result Date: 7/11/2022  1. Interval development of a small-to-moderate right  pleural effusion and minimal left pleural effusion. 2. Multiple small bilateral lower lobe pulmonary emboli, the largest of which appears to be associated with a 7 x 3 cm lateral right lower lobe infarct. 3. Ill-defined multifocal nodular and reticular disease of lungs elsewhere are concerning for infiltrating metastases, less likely atypical pneumonia.    Note: Preliminary findings were called to Dr. Pollock at approximately 5:20 PM 07/11/2022.    CT SCAN OF THE ABDOMEN PELVIS WITH IV CONTRAST: As on the prior study, there is diffuse fatty liver change. Today's exam shows a very small focus of subcapsular liver enhancement in the inferior right liver lobe, nonspecific, and essentially unchanged from the prior study, possibly incidental to the patient's known disease. No other hepatic lesions are identified. Spleen is not enlarged. No significant abnormalities are appreciated of the pancreas, adrenal glands, or kidneys except for renal cortical thinning, not unusual for the patient's age. Gallbladder is contracted but otherwise unremarkable.  The patient's advanced nodular peritoneal disease is overall relatively similar in distribution to the prior study, but with evidence of moderate gradual progression, including increasingly dense masslike anterior omental disease. No significant retroperitoneal lymphadenopathy is seen. There is little evidence of adenopathy in the small bowel mesentery. Bowel loops are normal in caliber. 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 Bladder is decompressed. Bony structures appear to be intact. Delayed venous phase images show no evidence of obstructive uropathy.  IMPRESSION:  1. Advanced, extensive peritoneal metastatic disease, with moderate general progression since 11/22/2021 exam. No evidence of intrinsic solid organ disease and no obvious retroperitoneal kortney disease. 2. Mild-to-moderate ascites, increased from prior study.  This report was finalized on  7/11/2022 10:02 PM by Dr. Caleb Trevino MD.      XR Chest 1 View    Result Date: 7/13/2022  1.  Right basilar consolidation, which could represent atelectasis or pneumonia. 2.  Pulmonary vascular congestion with small right pleural effusion. 3.  Cardiomegaly.  This report was finalized on 7/13/2022 10:45 AM by Alfonzo Bunn MD.         Echo: Results for orders placed during the hospital encounter of 06/02/22    Adult Transthoracic Echo Complete w/ Color, Spectral and Contrast if necessary per protocol    Interpretation Summary  · Left atrial volume is moderately increased.  · Estimated right ventricular systolic pressure from tricuspid regurgitation is mildly elevated (35-45 mmHg).  · Estimated left ventricular EF = 32% Estimated left ventricular EF was in agreement with the calculated left ventricular EF. Left ventricular ejection fraction appears to be 31 - 35%. Left ventricular systolic function is severely decreased.  · The following left ventricular wall segments are hypokinetic: mid anterior, apical anterior, basal anterolateral, mid anterolateral, apical lateral, basal inferolateral, mid inferolateral, apical inferior, mid inferior, apical septal, basal inferoseptal, mid inferoseptal, apex hypokinetic, mid anteroseptal, basal anterior, basal inferior and basal inferoseptal.  · The left ventricular cavity is mildly dilated.  · The findings are consistent with dilated cardiomyopathy.  · Left ventricular diastolic function is consistent with (grade II w/high LAP) pseudonormalization.  · Moderate to severe mitral valve regurgitation is present with an eccentric jet noted.  · Moderate tricuspid valve regurgitation is present.  · Mild to moderate pulmonary hypertension is present.  · Normal right ventricular cavity size, wall thickness, systolic function and septal motion noted.  · There is no evidence of pericardial effusion.     Carotid Duplex: Results for orders placed during the hospital encounter of  07/13/22    Duplex Venous Lower Extremity - Bilateral CAR    Interpretation Summary  · Sub-acute left lower extremity deep vein thrombosis noted in the common femoral and proximal femoral.  · Acute right lower extremity deep vein thrombosis noted in the peroneal.  · Acute left lower extremity deep vein thrombosis noted in the peroneal and soleal.  · All other veins appeared normal bilaterally.      Assessment:    PVC's (premature ventricular contractions)    Essential hypertension    C. 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 Jj regarding possible IVC filter and surgical candidacy           Teri Bo, EMILY  08:11 EDT  07/14/22     Thank you for allowing us to participate in the care of your patient. Please do not hesitate to contact us with additional questions or concerns.

## 2022-07-14 NOTE — THERAPY DISCHARGE NOTE
Acute Care - Occupational Therapy Discharge  [facility]    Patient Name: Desiree Forte  : 1940    MRN: [id number]                              Today's Date: 2022       Admit Date: 2022    Visit Dx:     ICD-10-CM ICD-9-CM   1. Hemoptysis  R04.2 786.30   2. Bilateral pulmonary embolism (HCC)  I26.99 415.19   3. Cancer, hepatocellular (HCC)  C22.0 155.0   4. Pneumonia of right lower lobe due to infectious organism  J18.9 486     Patient Active Problem List   Diagnosis   • BRUNO (dyspnea on exertion)   • PVC's (premature ventricular contractions)   • Essential hypertension   • At risk for sleep apnea   • C. 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)   • Hemoptysis   • Stage 3b chronic kidney disease (HCC)     Past Medical History:   Diagnosis Date   • Abnormal heart rhythm    • Cancer (HCC)     breast   • Clostridioides difficile diarrhea    • Congestive heart failure (CHF) (HCC)    • Hyperlipidemia    • Irregular heart beat    • Migraine     occular      Past Surgical History:   Procedure Laterality Date   • APPENDECTOMY     • BREAST SURGERY      lumpectomy with lymph nodes left    • COLONOSCOPY     • COLONOSCOPY N/A 2021    Procedure: COLONOSCOPY;  Surgeon: Jesus Morton MD;  Location: [facility] ENDOSCOPY;  Service: Gastroenterology;  Laterality: N/A;   • DIAGNOSTIC LAPAROSCOPY N/A 9/15/2021    Procedure: DIAGNOSTIC LAPAROSCOPY, OMENTAL BIOPSY AND PERITONEAL NODULE BIOPSY;  Surgeon: Nino Jaimes MD;  Location: [facility] OR;  Service: General;  Laterality: N/A;   • ENDOSCOPY N/A 2021    Procedure: ESOPHAGOGASTRODUODENOSCOPY WITH BIOPSY AND POLYPECTOMY;  Surgeon: Jesus Morton MD;  Location: [facility] ENDOSCOPY;  Service: Gastroenterology;  Laterality: N/A;   • EYE SURGERY Bilateral     cataract   • HYSTERECTOMY        General Information     Row Name 22 1132          OT Time and Intention    Document Type  evaluation  -     Mode of Treatment occupational therapy  -     Row Name 07/14/22 1132          General Information    Patient Profile Reviewed yes  -HK     Prior Level of Function independent:;all household mobility;community mobility;gait;transfer;bed mobility;ADL's  -     Existing Precautions/Restrictions fall  -HK     Barriers to Rehab medically complex;previous functional deficit  -     Row Name 07/14/22 1132          Living Environment    People in Home significant other  -     Row Name 07/14/22 1132          Home Main Entrance    Number of Stairs, Main Entrance none  -HK     Stair Railings, Main Entrance none  -HK     Row Name 07/14/22 1132          Stairs Within Home, Primary    Number of Stairs, Within Home, Primary none  -HK     Stair Railings, Within Home, Primary none  -HK     Row Name 07/14/22 1132          Cognition    Orientation Status (Cognition) oriented x 4  -     Row Name 07/14/22 1132          Safety Issues, Functional Mobility    Safety Issues Affecting Function (Mobility) safety precautions follow-through/compliance;safety precaution awareness;problem-solving  -     Impairments Affecting Function (Mobility) balance;motor planning  -           User Key  (r) = Recorded By, (t) = Taken By, (c) = Cosigned By    Initials Name Provider Type     Obdulia Collier, OT Occupational Therapist               Mobility/ADL's     Row Name 07/14/22 1134          Bed Mobility    Comment, (Bed Mobility) Received and left [facility].  -     Row Name 07/14/22 1134          Transfers    Transfers sit-stand transfer  -     Sit-Stand [place] (Transfers) independent  -     Row Name 07/14/22 1134          Sit-Stand Transfer    Assistive Device (Sit-Stand Transfers) --  none  -     Row Name 07/14/22 1134          Functional Mobility    Functional Mobility- Ind. Level contact guard assist;verbal cues required  -     Functional Mobility- Comment noted reaching for furniture during ambulation.  Educated pt she may benefit from SPC use for suppport.  -     Row Name 07/14/22 1134          Activities of Daily Living    BADL Assessment/Intervention lower body dressing  -     Row Name 07/14/22 1134          Lower Body Dressing Assessment/Training    [place] Level (Lower Body Dressing) don;socks;independent  -HK     Position (Lower Body Dressing) unsupported sitting  -HK     Comment, (Lower Body Dressing) extra time/effort.  -           User Key  (r) = Recorded By, (t) = Taken By, (c) = Cosigned By    Initials Name Provider Type     Obdulia Collier, OT Occupational Therapist               Obj/Interventions     Row Name 07/14/22 1135          Sensory Assessment (Somatosensory)    Sensory Assessment (Somatosensory) sensation intact  -[facility] Name 07/14/22 1135          Range of Motion Comprehensive    General Range of Motion no range of motion deficits identified  -     Comment, General Range of Motion B UE WNL  -[facility] Name 07/14/22 1135          Strength Comprehensive (MMT)    General Manual Muscle Testing (MMT) Assessment upper extremity strength deficits identified  -[facility] Name 07/14/22 1135          Upper Extremity (Manual Muscle Testing)    Upper Extremity: Manual Muscle Testing (MMT) right shoulder strength deficit;left shoulder strength deficit  -[facility] Name 07/14/22 1135          Balance    Balance Assessment sitting static balance;sitting dynamic balance;standing static balance;standing dynamic balance  -     Static Sitting Balance independent  -HK     Dynamic Sitting Balance independent  -HK     Static Standing Balance contact guard  -HK     Dynamic Standing Balance contact guard  -HK     Position/Device Used, Standing Balance unsupported  -HK     Balance Interventions sitting;occupation based/functional task  -     Row Name 07/14/22 1135          Right Shoulder (Manual Muscle Testing)    Right Shoulder Manual Muscle Testing (MMT) flexion  -     MMT: Flexion, Right  Shoulder flexion  -HK     MMT, Gross Movement: Right Shoulder Flexion (3+/5) fair plus  -HK     Row Name 07/14/22 1135          Left Shoulder (Manual Muscle Testing)    Left Shoulder Manual Muscle Testing (MMT) flexion  -HK     MMT: Flexion, Left Shoulder flexion  -HK     MMT, Gross Movement: Left Shoulder Flexion (3+/5) fair plus  -HK           User Key  (r) = Recorded By, (t) = Taken By, (c) = Cosigned By    Initials Name Provider Type    Obdulia Levy, OT Occupational Therapist               Goals/Plan    No documentation.                Clinical Impression     Row Name 07/14/22 1138          Pain Assessment    Pretreatment Pain Rating 0/10 - no pain  -HK     Posttreatment Pain Rating 0/10 - no pain  -HK     Row Name 07/14/22 1138          Plan of Care Review    Plan of Care Reviewed With patient  -HK     Progress improving  -     Outcome Evaluation OT eval complete. Pt is pleasent and cooperative. Transfers independently and completes LBD independently. Pt requres CGA for ambulation due to noted reaching for furniture. Pt educated on concerns for falls and to consider use of SPC for balance. At this time no deficits noted that require skilled OT services however pt will benefit from PT eval.  -     Row Name 07/14/22 1138          Therapy Assessment/Plan (OT)    Patient/Family Therapy Goal Statement (OT) Pt would like to improve and return home.  -HK     Rehab Potential (OT) good, to achieve stated therapy goals  -     Criteria for Skilled Therapeutic Interventions Met (OT) yes;skilled treatment is necessary  -     Therapy Frequency (OT) daily  -     Row Name 07/14/22 1138          Therapy Plan Review/Discharge Plan (OT)    Anticipated Discharge Disposition (OT) home with assist  -     Row Name 07/14/22 1138          Vital Signs    Pre Systolic BP Rehab --  RN cleared for tx; VSS  -HK     O2 Delivery Pre Treatment room air  -HK     O2 Delivery Intra Treatment room air  -HK     O2 Delivery Post  Treatment room air  -HK     Pre Patient Position Sitting  -HK     Intra Patient Position Standing  -HK     Post Patient Position Sitting  -HK     Row Name 07/14/22 1138          Positioning and Restraints    Pre-Treatment Position sitting in chair/recliner  -HK     Post Treatment Position chair  -HK     In Chair notified nsg;reclined;call light within reach;encouraged to call for assist  -HK           User Key  (r) = Recorded By, (t) = Taken By, (c) = Cosigned By    Initials Name Provider Type    Obdulia Levy, OT Occupational Therapist               Outcome Measures     Row Name 07/14/22 1143          How much help from another is currently needed...    Putting on and taking off regular lower body clothing? 4  -HK     Bathing (including washing, rinsing, and drying) 4  -HK     Toileting (which includes using toilet bed pan or urinal) 4  -HK     Putting on and taking off regular upper body clothing 4  -HK     Taking care of personal grooming (such as brushing teeth) 4  -HK     Eating meals 4  -HK     AM-PAC 6 Clicks Score (OT) 24  -HK     Row Name 07/14/22 0800          How much help from another person do you currently need...    Turning from your back to your side while in flat bed without using bedrails? 3  -MR     Moving from lying on back to sitting on the side of a flat bed without bedrails? 3  -MR     Moving to and from a bed to a chair (including a wheelchair)? 3  -MR     Standing up from a chair using your arms (e.g., wheelchair, bedside chair)? 3  -MR     Climbing 3-5 steps with a railing? 3  -MR     To walk in hospital room? 3  -MR     AM-PAC 6 Clicks Score (PT) 18  -MR     Highest level of mobility 6 --> Walked 10 steps or more  -MR     Row Name 07/14/22 1143          Functional Assessment    Outcome Measure Options AM-PAC 6 Clicks Daily Activity (OT)  -HK           User Key  (r) = Recorded By, (t) = Taken By, (c) = Cosigned By    Initials Name Provider Type    Obdulia Levy, OT Occupational  Therapist     Cande Mayfield, RN Registered Nurse              Occupational Therapy Education                 Title: PT OT SLP Therapies (In Progress)     Topic: Occupational Therapy (In Progress)     Point: ADL training (Done)     Description:   Instruct learner(s) on proper safety adaptation and remediation techniques during self care or transfers.   Instruct in proper use of assistive devices.              Learning Progress Summary           Patient Acceptance, E,TB,D, VU,NR by  at 7/14/2022 1144    Acceptance, E,TB,D, VU,NR by  at 7/14/2022 1144                   Point: Home exercise program (Not Started)     Description:   Instruct learner(s) on appropriate technique for monitoring, assisting and/or progressing therapeutic exercises/activities.              Learner Progress:  Not documented in this visit.          Point: Precautions (Done)     Description:   Instruct learner(s) on prescribed precautions during self-care and functional transfers.              Learning Progress Summary           Patient Acceptance, E,TB,D, VU,NR by  at 7/14/2022 1144    Acceptance, E,TB,D, VU,NR by  at 7/14/2022 1144                   Point: Body mechanics (Done)     Description:   Instruct learner(s) on proper positioning and spine alignment during self-care, functional mobility activities and/or exercises.              Learning Progress Summary           Patient Acceptance, E,TB,D, VU,NR by  at 7/14/2022 1144    Acceptance, E,TB,D, VU,NR by  at 7/14/2022 1144                               User Key     Initials Effective Dates Name Provider Type Discipline     06/16/21 -  Obdulia Collier, OT Occupational Therapist OT              OT Recommendation and Plan  Therapy Frequency (OT): daily  Plan of Care Review  Plan of Care Reviewed With: patient  Progress: improving  Outcome Evaluation: OT eval complete. Pt is pleasent and cooperative. Transfers independently and completes LBD independently. Pt requres CGA for  ambulation due to noted reaching for furniture. Pt educated on concerns for falls and to consider use of SPC for balance. At this time no deficits noted that require skilled OT services however pt will benefit from PT eval.  Plan of Care Reviewed With: patient  Outcome Evaluation: OT eval complete. Pt is pleasent and cooperative. Transfers independently and completes LBD independently. Pt requres CGA for ambulation due to noted reaching for furniture. Pt educated on concerns for falls and to consider use of SPC for balance. At this time no deficits noted that require skilled OT services however pt will benefit from PT eval.     Time Calculation:    Time Calculation- OT     Row Name 07/14/22 1049             Time Calculation- OT    OT Start Time 1049  -HK      OT Received On 07/14/22  -HK      OT Goal Re-Cert Due Date 07/24/22  -HK              Untimed Charges    OT Eval/Re-eval Minutes 56  -HK              Total Minutes    Untimed Charges Total Minutes 56  -HK       Total Minutes 56  -HK            User Key  (r) = Recorded By, (t) = Taken By, (c) = Cosigned By    Initials Name Provider Type    Obdulia Levy OT Occupational Therapist              Therapy Charges for Today     Code Description Service Date Service Provider Modifiers Qty    [id number] HC OT EVAL LOW COMPLEXITY 4 7/14/2022 Obdulia Collier OT GO 1             OT Discharge Summary  Anticipated Discharge Disposition (OT): home with assist    Obdulia Collier OT  7/14/2022

## 2022-07-14 NOTE — MBS/VFSS/FEES
Acute Care - Speech Language Pathology   Swallow Initial Evaluation  Sam   Modified Barium Swallow Study (MBS)     Patient Name: Desiree Forte  : 1940  MRN: [id number]  Today's Date: 2022               Admit Date: 2022    Visit Dx:     ICD-10-CM ICD-9-CM   1. Hemoptysis  R04.2 786.30   2. Bilateral pulmonary embolism (HCC)  I26.99 415.19   3. Cancer, hepatocellular (HCC)  C22.0 155.0   4. Pneumonia of right lower lobe due to infectious organism  J18.9 486   5. 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)   • Hemoptysis   • Stage 3b chronic kidney disease (HCC)     Past Medical History:   Diagnosis Date   • Abnormal heart rhythm    • Cancer (HCC)     breast   • Clostridioides difficile diarrhea    • Congestive heart failure (CHF) (HCC)    • Hyperlipidemia    • Irregular heart beat    • Migraine     occular      Past Surgical History:   Procedure Laterality Date   • APPENDECTOMY     • BREAST SURGERY      lumpectomy with lymph nodes left    • COLONOSCOPY     • COLONOSCOPY N/A 2021    Procedure: COLONOSCOPY;  Surgeon: Jesus Morton MD;  Location: [facility] END[facility];  Service: Gastroenterology;  Laterality: N/A;   • DIAGNOSTIC LAPAROSCOPY N/A 9/15/2021    Procedure: DIAGNOSTIC LAPAROSCOPY, OMENTAL BIOPSY AND PERITONEAL NODULE BIOPSY;  Surgeon: Nino Jaimes MD;  Location: [facility] OR;  Service: General;  Laterality: N/A;   • ENDOSCOPY N/A 2021    Procedure: ESOPHAGOGASTRODUODENOSCOPY WITH BIOPSY AND POLYPECTOMY;  Surgeon: Jesus Morton MD;  Location: [facility] ENDOSCOPY;  Service: Gastroenterology;  Laterality: N/A;   • EYE SURGERY Bilateral      cataract   • HYSTERECTOMY         SLP Recommendation and Plan  SLP Swallowing Diagnosis: mild, pharyngeal dysphagia (07/14/22 1240)  SLP Diet Recommendation: soft textures, whole, thin liquids, other (see comments) (OK to upgrade to regular if requesting) (07/14/22 1240)  Recommended Precautions and Strategies: upright posture during/after eating, alternate between small bites of food and sips of liquid, general aspiration precautions (07/14/22 1240)  SLP Rec. for Method of Medication Administration: meds whole, with thin liquids, with pudding or applesauce, as tolerated (07/14/22 1240)     Monitor for Signs of Aspiration: yes, notify SLP if any concerns (07/14/22 1240)     Swallow Criteria for Skilled Therapeutic Interventions Met: demonstrates skilled criteria (07/14/22 1240)  Anticipated Discharge Disposition (SLP): unknown, anticipate therapy at next level of care (07/14/22 1240)  Rehab Potential/Prognosis, Swallowing: good, to achieve stated therapy goals (07/14/22 1240)  Therapy Frequency (Swallow): 3 days per week (07/14/22 1240)  Predicted Duration Therapy Intervention (Days): until discharge (07/14/22 1240)                                  Plan of Care Reviewed With: patient      SWALLOW EVALUATION (last 72 hours)     SLP Adult Swallow Evaluation     Row Name 07/14/22 1240 07/14/22 0910                Rehab Evaluation    Document Type evaluation  - evaluation  -       Subjective Information no complaints  - no complaints  -       Patient Observations alert;cooperative  - alert;cooperative  -       Patient/Family/Caregiver Comments/Observations No family present  -MP Daughter present  -       Patient Effort good  -MP good  -       Symptoms Noted During/After Treatment -- none  -                General Information    Patient Profile Reviewed yes  -MP yes  -       Pertinent History Of Current Problem See AM eval  -MP Adm w/ hemoptysis and hypoxia. CXR: bilateral pulmonary emboli & nodular  opacification concerning for metastasis vs PNA. Hx: progressing stage IV liver CA, breast CA, CHF  -       Current Method of Nutrition soft textures;thin liquids  - soft textures;thin liquids  -       Precautions/Limitations, Vision -- WFL;for purposes of eval  -       Precautions/Limitations, Hearing -- WFL;for purposes of eval  -       Prior Level of Function-Communication -- unknown  -       Prior Level of Function-Swallowing -- unknown  -       Plans/Goals Discussed with -- patient;family  -       Barriers to Rehab -- medically complex  -       Patient's Goals for Discharge -- patient did not state  -       Family Goals for Discharge -- family did not state  -                Pain    Additional Documentation Pain Scale: FACES Pre/Post-Treatment (Group)  - Pain Scale: FACES Pre/Post-Treatment (Group)  -                Pain Scale: FACES Pre/Post-Treatment    Pain: FACES Scale, Pretreatment 0-->no hurt  -MP 0-->no hurt  -       Posttreatment Pain Rating 0-->no hurt  -MP 0-->no hurt  -                Oral Motor Structure and Function    Dentition Assessment -- natural, present and adequate  -       Secretion Management -- WNL/WF  -       Mucosal Quality -- moist, healthy  -                Oral Musculature and Cranial Nerve Assessment    Oral Motor General Assessment -- WFL  -                General Eating/Swallowing Observations    Respiratory Support Currently in Use -- room air  -       Eating/Swallowing Skills -- self-fed;fed by SLP  -       Positioning During Eating -- upright in bed  -       Utensils Used -- spoon;cup;straw  -       Consistencies Trialed -- ice chips;thin liquids;pureed;regular textures  -                Clinical Swallow Eval    Pharyngeal Phase -- suspected pharyngeal impairment  -       Clinical Swallow Evaluation Summary -- [facility] [place] this AM. Pt w/ overt s/s of aspiration. Throat clear & eventual cough w/ thin via straw sip following  sequential swallows. No overt s/s of aspiration w/ small, controlled sip of thin via straw or cup sip. Following regular solid trial pt grasped R side of neck and expressed she was in severe pain. Nectar trials withheld 2/2 severity of pain. No overt s/s w/ puree trials. Pt reports ongoing odynophagia, beginning ~8 months ago. Per chart review: Pt is followed by ENT, she underwent laryngoscopy for evaluation of odynophagia and was told she had an “inflamed nerve”. ENT documentation of visit is not available in [facility] at this time. Pt does not want to proceed w/ FEES, but is agreeable to complete MBS this PM. Rec: cont current diet, small sips w/ thin.  -                Pharyngeal Phase Concerns    Pharyngeal Phase Concerns -- cough;throat clear  -MH       Cough -- thin  -MH       Throat Clear -- thin  -MH                MBS/VFSS    Utensils Used spoon;cup;straw  -MP --       Consistencies Trialed thin liquids;pudding thick;regular textures  -MP --                MBS/VFSS Interpretation    Oral Prep Phase WFL  -MP --       Oral Transit Phase WFL  -MP --       Oral Residue WFL  -MP --       VFSS Summary Mild pharyngeal dysphagia. No penetration/aspiration w/ any consistency tested. Mild vallecular & pyriform residue across consistencies - reduced by alternating bites/sips, but never fully cleared. Rec soft whole diet (may upgrade to regular if requesting), thin liquids. Standard aspiration prctns. Pt would benefit from general pharyngeal strengthening exercises.  -MP --                Initiation of Pharyngeal Swallow    Initiation of Pharyngeal Swallow WFL  -MP --       Pharyngeal Phase impaired pharyngeal phase of swallowing  -MP --       Pharyngeal Residue thin liquids;pudding/puree;regular textures;valleculae;pyriform sinuses;secondary to reduced base of tongue retraction;secondary to reduced laryngeal elevation;secondary to reduced hyolaryngeal excursion  -MP --       Response to Residue other (see comments)   reduced by alternating bites/sips but never fully cleared  - --                SLP Evaluation Clinical Impression    SLP Swallowing Diagnosis mild;pharyngeal dysphagia  - suspected pharyngeal dysphagia  -       Functional Impact risk of aspiration/pneumonia  - risk of aspiration/pneumonia  -       Rehab Potential/Prognosis, Swallowing good, to achieve stated therapy goals  - good, to achieve stated therapy goals  -       Swallow Criteria for Skilled Therapeutic Interventions Met demonstrates skilled criteria  - demonstrates skilled criteria  -                Recommendations    Therapy Frequency (Swallow) 3 days per week  - --       Predicted Duration Therapy Intervention (Days) until discharge  - until discharge  -       SLP Diet Recommendation soft textures;whole;thin liquids;other (see comments)  OK to upgrade to regular if requesting  - soft textures;thin liquids  -       Recommended Diagnostics -- VFSS (MBS)  -       Recommended Precautions and Strategies upright posture during/after eating;alternate between small bites of food and sips of liquid;general aspiration precautions  - upright posture during/after eating;general aspiration precautions;small bites of food and sips of liquid  -       Oral Care Recommendations Oral Care BID/PRN  - Oral Care BID/PRN  -       SLP Rec. for Method of Medication Administration meds whole;with thin liquids;with pudding or applesauce;as tolerated  - meds crushed;with pudding or applesauce;as tolerated  -       Monitor for Signs of Aspiration yes;notify SLP if any concerns  - yes;notify SLP if any concerns  -       Anticipated Discharge Disposition (SLP) unknown;anticipate therapy at next level of care  - unknown  -             User Key  (r) = Recorded By, (t) = Taken By, (c) = Cosigned By    Initials Name Effective Dates     Dedrick Keyes, MS CCC-SLP 12/28/21 -      Thelma Morton MS, CFY-SLP 06/22/22 -                  EDUCATION  The patient has been educated in the following areas:   Dysphagia (Swallowing Impairment).        SLP GOALS     Row Name 07/14/22 1240             (LTG) Patient will demonstrate functional swallow for    Diet Texture (Demonstrate functional swallow) regular textures  -MP      Liquid viscosity (Demonstrate functional swallow) thin liquids  -MP      [place] (Demonstrate functional swallow) independently (over 90% accuracy)  -MP      Time Frame (Demonstrate functional swallow) by discharge  -MP              (STG) Pharyngeal Strengthening Exercise Goal 1 (SLP)    Activity (Pharyngeal Strengthening Goal 1, SLP) increase superior movement of the hyolaryngeal complex;increase anterior movement of the hyolaryngeal complex;increase squeeze/positive pressure generation;increase tongue base retraction  -MP      Increase Superior Movement of the Hyolaryngeal Complex effortful pitch glide (falsetto + pharyngeal squeeze)  -MP      Increase Anterior Movement of the Hyolaryngeal Complex chin tuck against resistance (CTAR)  -MP      Increase Squeeze/Positive Pressure Generation hard effortful swallow  -MP      Increase Tongue Base Retraction brian  -MP      [place]/Accuracy (Pharyngeal Strengthening Goal 1, SLP) independently (over 90% accuracy)  -MP      Time Frame (Pharyngeal Strengthening Goal 1, SLP) short term goal (STG)  -MP            User Key  (r) = Recorded By, (t) = Taken By, (c) = Cosigned By    Initials Name Provider Type    MP Dedrick Keyes MS CCC-SLP Speech and Language Pathologist                   Time Calculation:    Time Calculation- SLP     Row Name 07/14/22 1356 07/14/22 1338          Time Calculation- SLP    SLP Start Time 1240  - 0910  -     SLP Received On 07/14/22  - 07/14/22  -            Untimed Charges    21253-UG Eval Oral Pharyng Swallow Minutes -- 60  -MH     [id number] Motion Fluoro Eval Swallow Minutes 60  -MP --            Total Minutes    Untimed Charges  Total Minutes 60  -MP 60  -MH      Total Minutes 60  -MP 60  -MH           User Key  (r) = Recorded By, (t) = Taken By, (c) = Cosigned By    Initials Name Provider Type    Dedrick Castillo, MS CCC-SLP Speech and Language Pathologist    Thelma Araujo, MS, CFY-SLP Speech and Language Pathologist                Therapy Charges for Today     Code Description Service Date Service Provider Modifiers Qty    69750352246 HC ST MOTION FLUORO EVAL SWALLOW 4 7/14/2022 Dedrick Keyes, MS CCC-SLP GN 1               Dedrick Zhang MS CCC-DELANO  7/14/2022

## 2022-07-14 NOTE — DISCHARGE INSTR - DIET
MBS/VFSS   7/14/22  Reason for Referral  Patient was referred for a MBS to assess the efficiency of his/her swallow function, rule out aspiration and make recommendations regarding safe dietary consistencies, effective compensatory strategies, and safe eating environment.             Recommendations/Treatment  SLP Swallowing Diagnosis: mild, pharyngeal dysphagia (07/14/22 1240)  Functional Impact: risk of aspiration/pneumonia (07/14/22 1240)  Rehab Potential/Prognosis, Swallowing: good, to achieve stated therapy goals (07/14/22 1240)  Swallow Criteria for Skilled Therapeutic Interventions Met: demonstrates skilled criteria (07/14/22 1240)  Therapy Frequency (Swallow): 3 days per week (07/14/22 1240)  Predicted Duration Therapy Intervention (Days): until discharge (07/14/22 1240)  SLP Diet Recommendation: soft textures, whole, thin liquids, other (see comments) (OK to upgrade to regular if requesting) (07/14/22 1240)  Recommended Precautions and Strategies: upright posture during/after eating, alternate between small bites of food and sips of liquid, general aspiration precautions (07/14/22 1240)  SLP Rec. for Method of Medication Administration: meds whole, with thin liquids, with pudding or applesauce, as tolerated (07/14/22 1240)  Monitor for Signs of Aspiration: yes, notify SLP if any concerns (07/14/22 1240)  Anticipated Discharge Disposition (SLP): unknown, anticipate therapy at next level of care (07/14/22 1240)    Instrumental Set-up  Utensils Used: spoon, cup, straw (07/14/22 1240)  Consistencies Trialed: thin liquids, pudding thick, regular textures (07/14/22 1240)    Oral Preparation/ Oral Phase  Oral Prep Phase: WFL (07/14/22 1240)  Oral Transit Phase: WFL (07/14/22 1240)  Oral Residue: WFL (07/14/22 1240)             Pharyngeal Phase  Initiation of Pharyngeal Swallow: WFL (07/14/22 1240)  Pharyngeal Phase: impaired pharyngeal phase of swallowing (07/14/22 1240)  Pharyngeal Residue: thin liquids,  pudding/puree, regular textures, valleculae, pyriform sinuses, secondary to reduced base of tongue retraction, secondary to reduced laryngeal elevation, secondary to reduced hyolaryngeal excursion (07/14/22 1240)  Response to Residue: other (see comments) (reduced by alternating bites/sips but never fully cleared) (07/14/22 1240)  VFSS Summary: Mild pharyngeal dysphagia. No penetration/aspiration w/ any consistency tested. Mild vallecular & pyriform residue across consistencies - reduced by alternating bites/sips, but never fully cleared. Rec soft whole diet (may upgrade to regular if requesting), thin liquids. Standard aspiration prctns. Pt would benefit from general pharyngeal strengthening exercises. (07/14/22 1240)    Cervical Esophageal Phase

## 2022-07-14 NOTE — PROGRESS NOTES
" visited patient per palliative care referral.  Pt. Shared that she is hopeful in having more than what the MD said was \"limited time\" but \"if it's my time and God calls me home, I am ready\".  She shared about her vipin journey while growing up, that she loves \"the old hymns\" and feels connected to God.  She denied any spiritual needs at this time, was appreciative of a  visit.  "

## 2022-07-14 NOTE — THERAPY EVALUATION
Acute Care - Speech Language Pathology   Swallow Initial Evaluation [facility]   Clinical Swallow Evaluation       Patient Name: Desiree Forte  : 1940  MRN: [id number]  Today's Date: 2022               Admit Date: 2022    Visit Dx:     ICD-10-CM ICD-9-CM   1. Hemoptysis  R04.2 786.30   2. Bilateral pulmonary embolism (HCC)  I26.99 415.19   3. Cancer, hepatocellular (HCC)  C22.0 155.0   4. Pneumonia of right lower lobe due to infectious organism  J18.9 486   5. 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)   • Hemoptysis   • Stage 3b chronic kidney disease (HCC)     Past Medical History:   Diagnosis Date   • Abnormal heart rhythm    • Cancer (HCC)     breast   • Clostridioides difficile diarrhea    • Congestive heart failure (CHF) (HCC)    • Hyperlipidemia    • Irregular heart beat    • Migraine     occular      Past Surgical History:   Procedure Laterality Date   • APPENDECTOMY     • BREAST SURGERY      lumpectomy with lymph nodes left    • COLONOSCOPY     • COLONOSCOPY N/A 2021    Procedure: COLONOSCOPY;  Surgeon: Jesus Morton MD;  Location: [facility] ENDOSCOPY;  Service: Gastroenterology;  Laterality: N/A;   • DIAGNOSTIC LAPAROSCOPY N/A 9/15/2021    Procedure: DIAGNOSTIC LAPAROSCOPY, OMENTAL BIOPSY AND PERITONEAL NODULE BIOPSY;  Surgeon: Nino Jaimes MD;  Location: [facility] OR;  Service: General;  Laterality: N/A;   • ENDOSCOPY N/A 2021    Procedure: ESOPHAGOGASTRODUODENOSCOPY WITH BIOPSY AND POLYPECTOMY;  Surgeon: Jesus Morton MD;  Location: [facility] ENDOSCOPY;  Service: Gastroenterology;  Laterality: N/A;   • EYE SURGERY Bilateral     cataract    • HYSTERECTOMY         SLP Recommendation and Plan  SLP Swallowing Diagnosis: suspected pharyngeal dysphagia (07/14/22 0910)  SLP Diet Recommendation: soft textures, thin liquids (07/14/22 0910)  Recommended Precautions and Strategies: upright posture during/after eating, general aspiration precautions, small bites of food and sips of liquid (07/14/22 0910)  SLP Rec. for Method of Medication Administration: meds crushed, with pudding or applesauce, as tolerated (07/14/22 0910)     Monitor for Signs of Aspiration: yes, notify SLP if any concerns (07/14/22 0910)  Recommended Diagnostics: VFSS ([facility] [place] (07/14/22 0910)  Swallow Criteria for Skilled Therapeutic Interventions Met: demonstrates skilled criteria (07/14/22 0910)  Anticipated Discharge Disposition (SLP): unknown (07/14/22 0910)  Rehab Potential/Prognosis, Swallowing: good, to achieve stated therapy goals (07/14/22 0910)     Predicted Duration Therapy Intervention (Days): until discharge (07/14/22 0910)                                         SWALLOW EVALUATION (last 72 hours)     SLP Adult Swallow Evaluation     Row Name 07/14/22 0910                   Rehab Evaluation    Document Type evaluation  -        Subjective Information no complaints  -        Patient Observations alert;cooperative  -        Patient/Family/Caregiver Comments/Observations Daughter present  -        Patient Effort good  -        Symptoms Noted During/After Treatment none  -                  General Information    Patient Profile Reviewed yes  -        Pertinent History Of Current Problem Adm w/ hemoptysis and hypoxia. CXR: bilateral pulmonary emboli & nodular opacification concerning for metastasis vs PNA. Hx: progressing stage IV liver CA, breast CA, CHF  -        Current Method of Nutrition soft textures;thin liquids  -        Precautions/Limitations, Vision WFL;for purposes of eval  -        Precautions/Limitations, Hearing WFL;for purposes of eval  -         Prior Level of Function-Communication unknown  -        Prior Level of Function-Swallowing unknown  -        Plans/Goals Discussed with patient;family  -        Barriers to Rehab medically complex  -        Patient's Goals for Discharge patient did not state  -        Family Goals for Discharge family did not state  -                  Pain    Additional Documentation Pain Scale: FACES Pre/Post-Treatment (Group)  -                  Pain Scale: FACES Pre/Post-Treatment    Pain: FACES Scale, Pretreatment 0-->no hurt  -        Posttreatment Pain Rating 0-->no hurt  -                  Oral Motor Structure and Function    Dentition Assessment natural, present and adequate  -        Secretion Management WNL/WFL  -        Mucosal Quality moist, healthy  -                  Oral Musculature and Cranial Nerve Assessment    Oral Motor General Assessment WFL  -                  General Eating/Swallowing Observations    Respiratory Support Currently in Use room air  -        Eating/Swallowing Skills self-fed;fed by SLP  -        Positioning During Eating upright in bed  -        Utensils Used spoon;cup;straw  -        Consistencies Trialed ice chips;thin liquids;pureed;regular textures  -                  Clinical Swallow Eval    Pharyngeal Phase suspected pharyngeal impairment  -        Clinical Swallow Evaluation Summary CSE completed this AM. Pt w/ overt s/s of aspiration. Throat clear & eventual cough w/ thin via straw sip following sequential swallows. No overt s/s of aspiration w/ small, controlled sip of thin via straw or cup sip. Following regular solid trial pt grasped R side of neck and expressed she was in severe pain. Nectar trials withheld 2/2 severity of pain. No overt s/s w/ puree trials. Pt reports ongoing odynophagia, beginning ~8 months ago. Per chart review: Pt is followed by ENT, she underwent laryngoscopy for evaluation of odynophagia and was told she had an “inflamed  nerve”. ENT documentation of visit is not available in [facility] at this time. Pt does not want to proceed w/ FEES, but is agreeable to complete MBS this PM. Rec: cont current diet, small sips w/ thin.  -                  Pharyngeal Phase Concerns    Pharyngeal Phase Concerns cough;throat clear  -        Cough thin  -        Throat Clear thin  -                  SLP Evaluation Clinical Impression    SLP Swallowing Diagnosis suspected pharyngeal dysphagia  -        Functional Impact risk of aspiration/pneumonia  -        Rehab Potential/Prognosis, Swallowing good, to achieve stated therapy goals  -        Swallow Criteria for Skilled Therapeutic Interventions Met demonstrates skilled criteria  -                  Recommendations    Predicted Duration Therapy Intervention (Days) until discharge  -        SLP Diet Recommendation soft textures;thin liquids  -        Recommended Diagnostics VFSS ([facility])  -        Recommended Precautions and Strategies upright posture during/after eating;general aspiration precautions;small bites of food and sips of liquid  -        Oral Care Recommendations Oral Care BID/PRN  -        SLP Rec. for Method of Medication Administration meds crushed;with pudding or applesauce;as tolerated  -        Monitor for Signs of Aspiration yes;notify SLP if any concerns  -        Anticipated Discharge Disposition (SLP) unknown  -              User Key  (r) = Recorded By, (t) = Taken By, (c) = Cosigned By    Initials Name Effective Dates     Thelma Morton, MS, CFY-SLP 06/22/22 -                 EDUCATION  The patient has been educated in the following areas:   Dysphagia (Swallowing Impairment).              Time Calculation:    Time Calculation- SLP     Row Name 07/14/22 8999             Time Calculation- [facility]    SLP Start Time 0910  -      SLP Received On 07/14/22  -              Untimed Charges    86472-[id number] Eval Oral Pharyng Swallow Minutes 60  -              Total  Minutes    Untimed Charges Total Minutes 60  -MH       Total Minutes 60  -MH            User Key  (r) = Recorded By, (t) = Taken By, (c) = Cosigned By    Initials Name Provider Type     Thelma Morton MS, CFY-SLP Speech and Language Pathologist                Therapy Charges for Today     Code Description Service Date Service Provider Modifiers Qty    [id number] HC ST EVAL ORAL PHARYNG SWALLOW 4 7/14/2022 Thelma Morton MS, CFY-SLP GN 1            Patient was not wearing a face mask and did not exhibit coughing during this therapy encounter.  Procedure performed was not aerosolizing, did not involve close contact (within 6 feet for at least 15 minutes or longer), and did not involve contact with infectious secretions or specimens.  Therapist used appropriate personal protective equipment including gloves, standard procedure mask and eye protection.  Appropriate PPE was worn during the entire therapy session.  Hand hygiene was completed before and after therapy session.       Thelma Morton MS, DAQUAN-SLP  7/14/2022

## 2022-07-14 NOTE — PLAN OF CARE
Goal Outcome Evaluation:      VSS on RA, no c/o pain this shift, echo obtained this am--pending, pt. To go for MBS today, will continue POC

## 2022-07-14 NOTE — CASE MANAGEMENT/SOCIAL WORK
Discharge Planning Assessment  [facility]     Patient Name: Desiree Forte  MRN: [id number]  Today's Date: 7/14/2022    Admit Date: 7/13/2022     Discharge Needs Assessment     Row Name 07/14/22 1139       Living Environment    People in Home significant other    Name(s) of People in Home SO is Nikolai Schaffer    Current Living Arrangements home    Primary Care Provided by self    Provides Primary Care For no one    Family Caregiver if Needed significant other;child(shara), adult    Quality of Family Relationships involved;helpful;supportive    Able to Return to Prior Arrangements yes       Transition Planning    Patient/Family Anticipates Transition to home with family    Patient/Family Anticipated Services at Transition --  to be determined    Transportation Anticipated family or friend will provide       Discharge Needs Assessment    Readmission Within the Last 30 Days no previous admission in last 30 days    Equipment Currently Used at Home wheelchair;grab bar;shower chair  WC is for transport purposes    Concerns to be Addressed --  to be determined    Equipment Needed After Discharge --  to be determined    Discharge Coordination/Progress Pt lives in [place] with her significant other.  Pt also as two supportive daughters.  Pt has a PCP and insurance with prescription coverage.  She did not use home health services. Pt uses a wheelchair for transport and has a shower chair and grab bars in the bathroom.  Pt's family will provide transportation home at discharge.  At this time, pt is unsure of any discharge needs she might have.               Discharge Plan     Row Name 07/14/22 1149       Plan    Plan onging, consults pending    Plan Comments SW met with pt and her daughter, Harleen.  Pt was pleasant and UIC during visit.  Pt. has 2 consults pending and needs are still being assessed.  CM/SW will remain available to assist pt with any discharge needs she may have.  Pt is agreeable with current plan.     Final Discharge Disposition Code 30 - still a patient              Continued Care and Services - Admitted Since 7/13/2022    Coordination has not been started for this encounter.          Demographic Summary     Row Name 07/14/22 1138       General Information    Admission Type inpatient    Arrived From home    Preferred Language English    General Information Comments PCP Is Kristofer Elder               Functional Status     Row Name 07/14/22 1138       Employment/    Employment/ Comments Pt has insurance and prescription coverage through Medicare and [employer].               Psychosocial    No documentation.                Abuse/Neglect    No documentation.                Legal    No documentation.                Substance Abuse    No documentation.                Patient Forms    No documentation.                   LALY Shepherd

## 2022-07-14 NOTE — CONSULTS
Palliative Care Initial Consult   Attending Physician: Sussy Alonso MD  Referring Provider: Dr. Sirisha Alonso    Reason for Referral:  assistance with clarification of goals of care and hospice referral or discussion    Code Status:   Code Status and Medical Interventions:   Ordered at: 07/13/22 1345     Medical Intervention Limits:    NO intubation (DNI)    NO cardioversion    NO dialysis    NO antibiotics    NO artificial nutrition    NO vasopressors     Level Of Support Discussed With:    Patient     Code Status (Patient has no pulse and is not breathing):    No CPR (Do Not Attempt to Resuscitate)     Medical Interventions (Patient has pulse or is breathing):    Limited Support      Advanced Directives:   Family/Support: Harleen Duff (dtr), Sonya Corbett (dtr)  Goals of Care: TBD.    HPI: Desiree Forte is a 82 y.o. female with PMH significant for CHF, ECHO 6/2/22 with EF 32%, CKD III, HLD, PVC's, and metastatic hepatocellular carcinoma. Patient [facility] on 7/13 with SOA and hemoptysis. Work up confirmed small-to-moderate right pleural effusion previously seen on CT 7/11. Bilateral pulmonary emboli seen on CT with pulmonary following and not recommending anticoagulation due to hemoptysis. Duplex Venous Lower Extremity showing DVT's to right and left peroneal veins. Elevated proBNP. Palliative Care consulted for GOC in the context of complex medical decision making.   Patient alert and oriented x4, sitting up in chair with daughter at bedside. Patient reports mostly sitting at home with assistance to get in and out of shower, up to bathroom on her own. Patient lives alone except for a significant other who participates in her care. Currently on immunotherapy q 3 weeks and missed dose due to hospitalization. Patient reports intermittent nausea with one episode of vomiting yesterday. Reports gas that is bothersome after resolution of C-diff infection this past year. Reports LBM today but concern for  "constipation and takes stool softeners at home. Reports SOA with turning in bed and any type of exertion. Reports cough that started a few months ago with clear thick sputum out, cleared up and then returned two weeks ago, reports one episode of hemoptysis and SOA that brought patient to hospital. Patient reports improved SOA and decreased cough with budesonide nebs. Patient states she has not been drinking more than 1L water per day and taking Mucinex for cough.  Patient reports ongoing left-sided neck pain that is worse with talking, \"irritated, tingling\" feeling, which has been seen by ENT. Patient and daughter report having a laryngoscopy in office and having an adverse reaction resulting in two weeks of disorientation. Patient was prescribed Neurontin at the time and took for two days but stopped medication due to disorientation post procedure. Patient would like to try Neurontin again as she is fully orientated. Patient also reports right side pain that radiates up to her shoulder that comes and goes that started three weeks ago, describes as stabbing and aching, denies this pain at present, states relieved with heating pad. Patient reports adverse reaction to narcotics feeling \"stoned\". Patient denies any past ETOH use. Patient states she has often coughs after eating and eats soft foods.    ROS: +pain, left-side neck, right side radiating to shoulder. +nausea with episode of vomiting yesterday. +cough, productive, thick clear sputum. +debility. +constipation, at risk. +gas. +dysphagia. Please see HPI for description of positive findings.     Past Medical History:   Diagnosis Date   • Abnormal heart rhythm    • Cancer (HCC)     breast   • Clostridioides difficile diarrhea    • Congestive heart failure (CHF) (HCC)    • Hyperlipidemia    • Irregular heart beat    • Migraine     occular      Past Surgical History:   Procedure Laterality Date   • APPENDECTOMY     • BREAST SURGERY      lumpectomy with lymph nodes " "left    • COLONOSCOPY     • COLONOSCOPY N/A 9/7/2021    Procedure: COLONOSCOPY;  Surgeon: Jesus Morton MD;  Location:  [facility] ENDOSCOPY;  Service: Gastroenterology;  Laterality: N/A;   • DIAGNOSTIC LAPAROSCOPY N/A 9/15/2021    Procedure: DIAGNOSTIC LAPAROSCOPY, OMENTAL BIOPSY AND PERITONEAL NODULE BIOPSY;  Surgeon: Nino Jaimes MD;  Location:  [facility] OR;  Service: General;  Laterality: N/A;   • ENDOSCOPY N/A 9/7/2021    Procedure: ESOPHAGOGASTRODUODENOSCOPY WITH BIOPSY AND POLYPECTOMY;  Surgeon: Jesus Morton MD;  Location:  [facility];  Service: Gastroenterology;  Laterality: N/A;   • EYE SURGERY Bilateral     cataract   • HYSTERECTOMY       Social History     Socioeconomic History   • Marital status:    Tobacco Use   • Smoking status: Never Smoker   • Smokeless tobacco: Never Used   Vaping Use   • Vaping Use: Never used   Substance and Sexual Activity   • Alcohol use: Not Currently   • Drug use: Never   • Sexual activity: Defer     Family History   Problem Relation Age of Onset   • Leukemia Mother    • Stroke Father    • Colon cancer Neg Hx    • Colon polyps Neg Hx    • Esophageal cancer Neg Hx        Allergies   Allergen Reactions   • Metformin Diarrhea   • Statins Myalgia     Muscle pain       Current medication reviewed for route, type, dose and frequency and are current per MAR at time of dictation.    Palliative Performance Scale Score:  50%    /67 (BP Location: Right arm, Patient Position: Lying)   Pulse 69   Temp 96.6 °F (35.9 °C) (Oral)   Resp 18   Ht 162.6 cm (64\")   Wt 45.4 kg (100 lb)   SpO2 96%   BMI 17.16 kg/m²   No intake or output data in the 24 hours ending 07/14/22 0749    Physical Exam:    General Appearance:    Patient sitting up in chair, awake, alert, appears stated age, cooperative, NAD   HEENT:    NC/AT, EOMI, anicteric, MMM, face relaxed   Neck:   supple, trachea midline, no JVD   Lungs:     CTA bilat, diminished in bases; respirations regular, " even and unlabored; RR 18-22 on exam, on RA    Heart:    RRR, normal S1 and S2, murmur present, HR 69   Abdomen:     Hyperactive bowel sounds, soft, non-tender, mildly distended   G/U:   Deferred   MSK/Extremities:   Wasting, no edema   Pulses:   Pulses palpable and equal bilaterally   Skin:   Warm, dry   Neurologic:   A/Ox3, cooperative, DAVIES   Psych:   Calm, appropriate         Labs:   Results from last 7 days   Lab Units 07/14/22  0439   WBC 10*3/mm3 8.03   HEMOGLOBIN g/dL 12.1   HEMATOCRIT % 36.3   PLATELETS 10*3/mm3 464*     Results from last 7 days   Lab Units 07/14/22  0439   SODIUM mmol/L 142   POTASSIUM mmol/L 4.1   CHLORIDE mmol/L 102   CO2 mmol/L 33.0*   BUN mg/dL 32*   CREATININE mg/dL 1.17*   GLUCOSE mg/dL 75   CALCIUM mg/dL 9.4     Results from last 7 days   Lab Units 07/14/22  0439 07/13/22  0949   SODIUM mmol/L 142 143   POTASSIUM mmol/L 4.1 3.8   CHLORIDE mmol/L 102 101   CO2 mmol/L 33.0* 33.0*   BUN mg/dL 32* 34*   CREATININE mg/dL 1.17* 1.33*   CALCIUM mg/dL 9.4 10.1   BILIRUBIN mg/dL  --  1.5*   ALK PHOS U/L  --  89   ALT (SGPT) U/L  --  10   AST (SGOT) U/L  --  27   GLUCOSE mg/dL 75 92     Imaging Results (Last 72 Hours)     Procedure Component Value Units Date/Time    XR Chest 1 View [[id number]] Collected: 07/13/22 1038     Updated: 07/13/22 1048    Narrative:      XR CHEST 1 VW-     Date of Exam: 7/13/2022 10:27 AM     Indication: SOA triage protocol.     Comparison Exams: CT chest from July 11, 2022     Technique: Single AP chest radiograph     FINDINGS:  There is a right basilar consolidation and a small right pleural  effusion. The heart is enlarged. There is pulmonary vascular congestion.  The osseous structures appear intact.       Impression:      1.  Right basilar consolidation, which could represent atelectasis or  pneumonia.  2.  Pulmonary vascular congestion with small right pleural effusion.  3.  Cardiomegaly.     This report was finalized on 7/13/2022 10:45 AM by Alfonzo Bunn  "MD.               Lab 07/14/22  0439   HEMOGLOBIN A1C 5.60         Diagnostics: Reviewed    A:   Patient Active Problem List   Diagnosis   • BRUNO (dyspnea on exertion)   • PVC's (premature ventricular contractions)   • Essential hypertension   • At risk for sleep apnea   • C. 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 female with PE, DVT's, hepatocellular carcinoma.    S/S:   1. Pain -chronic left-side neck pain potentially neuropathic  -started Gabapentin 100mg PO BID   -continue Tylenol 650mg PO q4 hours prn  -patient declines narcotic pain management due to previous \"stoned\" adverse reactions  -acute onset, right sided pain with radiation to shoulder, MSK versus PE  -apply Lidoderm patch to right side  -may use Tylenol for pain    2. SOA/Cough -encourage increased oral fluids to thin secretions  -discontinued Promethazine with codeine due to patient's reported intolerance for narcotics  -started Mucinex-DM for thinning secretions and cough    3. Nausea -continue Zofran 8mg PO q 8 hours prn    4. Gas -started Simethicone 80mg PO four times daily prn    5. Debility -PT/OT following    6. Dysphagia -SLP evaluation pending    7. Constipation -prevention  -started Docusate Sodium 100mg PO BID per home regimen    8. GOC -DNR/DNI -per review of charts and LW  -copy of LW/ACP received from daughter   -symptom management as above and discussed with patient and daughter  -further GOC discussion pending regarding further work up and consult by Hem/Onc    P: Introduced Palliative Care and services. Patient and daughter excellent historians. Discussed symptoms above and patient does not want narcotics for symptom management due to previous adverse reaction of disorientation. Patient would like to try Gabapentin for left-side neck pain that may be neuropathic in nature, will trial. Discussed all " medication adjustments above and patient and daughter in agreement. Discussed returning tomorrow for further GOC pending Hem/Onc consult. All questions and concerns answered.   Thank you for this consult and allowing us to participate in patient's plan of care. Palliative Care Team will continue to follow patient. Please do not hesitate to contact us regarding further symptom management or goals of care needs.  Time: 60 minutes spent reviewing medical and medication records, assessing and examining patient, discussing with family, answering questions, providing some guidance about a plan and documentation of care, and coordinating care with other healthcare members, with > 50% time spent face to face.         Cinthya Foster, APRN  7/14/2022

## 2022-07-15 LAB
ALBUMIN SERPL-MCNC: 2.6 G/DL (ref 3.5–5.2)
ALBUMIN/GLOB SERPL: 1.2 G/DL
ALP SERPL-CCNC: 77 U/L (ref 39–117)
ALT SERPL W P-5'-P-CCNC: 8 U/L (ref 1–33)
ANION GAP SERPL CALCULATED.3IONS-SCNC: 8 MMOL/L (ref 5–15)
AST SERPL-CCNC: 21 U/L (ref 1–32)
BACTERIA SPEC AEROBE CULT: ABNORMAL
BASOPHILS # BLD AUTO: 0.03 10*3/MM3 (ref 0–0.2)
BASOPHILS NFR BLD AUTO: 0.4 % (ref 0–1.5)
BILIRUB SERPL-MCNC: 1.1 MG/DL (ref 0–1.2)
BUN SERPL-MCNC: 28 MG/DL (ref 8–23)
BUN/CREAT SERPL: 28.3 (ref 7–25)
CALCIUM SPEC-SCNC: 8.6 MG/DL (ref 8.6–10.5)
CHLORIDE SERPL-SCNC: 106 MMOL/L (ref 98–107)
CO2 SERPL-SCNC: 29 MMOL/L (ref 22–29)
CREAT SERPL-MCNC: 0.99 MG/DL (ref 0.57–1)
DEPRECATED RDW RBC AUTO: 61.3 FL (ref 37–54)
EGFRCR SERPLBLD CKD-EPI 2021: 57 ML/MIN/1.73
EOSINOPHIL # BLD AUTO: 0.31 10*3/MM3 (ref 0–0.4)
EOSINOPHIL NFR BLD AUTO: 4.2 % (ref 0.3–6.2)
ERYTHROCYTE [DISTWIDTH] IN BLOOD BY AUTOMATED COUNT: 20 % (ref 12.3–15.4)
GLOBULIN UR ELPH-MCNC: 2.2 GM/DL
GLUCOSE SERPL-MCNC: 77 MG/DL (ref 65–99)
GRAM STN SPEC: ABNORMAL
HCT VFR BLD AUTO: 33.4 % (ref 34–46.6)
HGB BLD-MCNC: 11.2 G/DL (ref 12–15.9)
IMM GRANULOCYTES # BLD AUTO: 0.03 10*3/MM3 (ref 0–0.05)
IMM GRANULOCYTES NFR BLD AUTO: 0.4 % (ref 0–0.5)
ISOLATED FROM: ABNORMAL
LYMPHOCYTES # BLD AUTO: 0.65 10*3/MM3 (ref 0.7–3.1)
LYMPHOCYTES NFR BLD AUTO: 8.8 % (ref 19.6–45.3)
MCH RBC QN AUTO: 29.1 PG (ref 26.6–33)
MCHC RBC AUTO-ENTMCNC: 33.5 G/DL (ref 31.5–35.7)
MCV RBC AUTO: 86.8 FL (ref 79–97)
MONOCYTES # BLD AUTO: 0.66 10*3/MM3 (ref 0.1–0.9)
MONOCYTES NFR BLD AUTO: 8.9 % (ref 5–12)
NEUTROPHILS NFR BLD AUTO: 5.72 10*3/MM3 (ref 1.7–7)
NEUTROPHILS NFR BLD AUTO: 77.3 % (ref 42.7–76)
NRBC BLD AUTO-RTO: 0 /100 WBC (ref 0–0.2)
PLATELET # BLD AUTO: 424 10*3/MM3 (ref 140–450)
PMV BLD AUTO: 10.6 FL (ref 6–12)
POTASSIUM SERPL-SCNC: 4 MMOL/L (ref 3.5–5.2)
PROT SERPL-MCNC: 4.8 G/DL (ref 6–8.5)
RBC # BLD AUTO: 3.85 10*6/MM3 (ref 3.77–5.28)
SODIUM SERPL-SCNC: 143 MMOL/L (ref 136–145)
WBC NRBC COR # BLD: 7.4 10*3/MM3 (ref 3.4–10.8)

## 2022-07-15 PROCEDURE — 80053 COMPREHEN METABOLIC PANEL: CPT | Performed by: INTERNAL MEDICINE

## 2022-07-15 PROCEDURE — 85025 COMPLETE CBC W/AUTO DIFF WBC: CPT | Performed by: INTERNAL MEDICINE

## 2022-07-15 PROCEDURE — 99233 SBSQ HOSP IP/OBS HIGH 50: CPT | Performed by: INTERNAL MEDICINE

## 2022-07-15 PROCEDURE — 97530 THERAPEUTIC ACTIVITIES: CPT

## 2022-07-15 PROCEDURE — 97162 PT EVAL MOD COMPLEX 30 MIN: CPT

## 2022-07-15 PROCEDURE — 94664 DEMO&/EVAL PT USE INHALER: CPT

## 2022-07-15 PROCEDURE — 94799 UNLISTED PULMONARY SVC/PX: CPT

## 2022-07-15 RX ORDER — GABAPENTIN 100 MG/1
200 CAPSULE ORAL EVERY 12 HOURS SCHEDULED
Status: DISCONTINUED | OUTPATIENT
Start: 2022-07-15 | End: 2022-07-17 | Stop reason: HOSPADM

## 2022-07-15 RX ADMIN — GABAPENTIN 100 MG: 100 CAPSULE ORAL at 08:37

## 2022-07-15 RX ADMIN — BUDESONIDE 0.5 MG: 0.5 SUSPENSION RESPIRATORY (INHALATION) at 08:51

## 2022-07-15 RX ADMIN — BUDESONIDE 0.5 MG: 0.5 SUSPENSION RESPIRATORY (INHALATION) at 19:45

## 2022-07-15 RX ADMIN — DOCUSATE SODIUM 100 MG: 100 CAPSULE, LIQUID FILLED ORAL at 20:49

## 2022-07-15 RX ADMIN — SPIRONOLACTONE 12.5 MG: 25 TABLET ORAL at 08:37

## 2022-07-15 RX ADMIN — TORSEMIDE 20 MG: 20 TABLET ORAL at 08:37

## 2022-07-15 RX ADMIN — GABAPENTIN 200 MG: 100 CAPSULE ORAL at 20:48

## 2022-07-15 RX ADMIN — DOCUSATE SODIUM 100 MG: 100 CAPSULE, LIQUID FILLED ORAL at 08:37

## 2022-07-15 RX ADMIN — Medication 10 ML: at 20:49

## 2022-07-15 RX ADMIN — LIDOCAINE 1 PATCH: 50 PATCH CUTANEOUS at 08:38

## 2022-07-15 RX ADMIN — PROMETHAZINE HYDROCHLORIDE AND CODEINE PHOSPHATE 5 ML: 6.25; 1 SOLUTION ORAL at 21:02

## 2022-07-15 RX ADMIN — GUAIFENESIN AND DEXTROMETHORPHAN HYDROBROMIDE 1 TABLET: 600; 30 TABLET, EXTENDED RELEASE ORAL at 20:49

## 2022-07-15 RX ADMIN — CARVEDILOL 6.25 MG: 6.25 TABLET, FILM COATED ORAL at 08:37

## 2022-07-15 RX ADMIN — CARVEDILOL 6.25 MG: 6.25 TABLET, FILM COATED ORAL at 20:49

## 2022-07-15 NOTE — PLAN OF CARE
Goal Outcome Evaluation:  Plan of Care Reviewed With: (P) patient, daughter        Progress: (P) no change  Outcome Evaluation: (P) PT evaluation completed. Pt presents w/ deficits including reduced activity tolerance, impaired balance, generalized weakness, and reduced functional mobility limiting independence. Pt ambulated 400 ft CGA x 1 using RW; requiring cueing for gait mechanics and posture. Pt w/ good safety awareness. Aulation initially began w/ R UE HHA, but transitioned to RW d/t increased unsteadiness noted during ambulation. Pt will cont to benefit from IPPT. PT rec d/c home w/ assist pending medical appropriateness.

## 2022-07-15 NOTE — PLAN OF CARE
"Goal Outcome Evaluation:  Plan of Care Reviewed With: patient, daughter        Progress: improving  Outcome Evaluation: New palliative consult for asssitance with GOC/hospice discussion per Dr. Morton.  EMILY Duran saw pt. and discussed GOC.  Pt plans to proceed with second line treatment regimen with oncology.  Pt. denied any real \"pain\" during palliative nursing visit.  She declined hospice at this time.  Pt. sitting up in chair eating lunch.  Noted to eat half of meals.   She denied dyspnea at time of visit but stated \"it just comes on me quick\" (referring to dyspnea).  Principal complaint is is vocal cord paralysis that causes a \"sandpaper on raw sore\" when she speaks.  APRN added gabapentin to attempt symptom management.   Palliative care to follow for support, POC and ongoing symptom management.      1330 Palliative IDT meeting: JESÚS Fonseca RN, CHPN; JOHNATHAN Jorge RN, CHPN; EMILY Duran; CARLOS EDUARDO Gonzales MD ; JIMMY Gilbert, [facility], [facility]; AAMIR Chambers RN; DUSTIN Sinha, AILYN, CHPN  "

## 2022-07-15 NOTE — CASE MANAGEMENT/SOCIAL WORK
Continued Stay Note  [facility]     Patient Name: Desiree Forte  MRN: [id number]  Today's Date: 7/15/2022    Admit Date: 7/13/2022     Discharge Plan     Row Name 07/15/22 1409       Plan    Plan CM/ MSW to follow    Plan Comments Pt has declined Hospice and is working with Palliative care. CM/ MSW to be available for discharge planning               Discharge Codes    No documentation.                     Latrice Alonso MSW

## 2022-07-15 NOTE — PROGRESS NOTES
[facility] Medicine Services  PROGRESS NOTE    Patient Name: Desiree Forte  : 1940  MRN: [id number]    Date of Admission: 2022  Primary Care Physician: Kristofer Elder MD    Subjective   Subjective     CC:  hemoptysis    HPI:  Feels ok.  C/o bad pain in her throat--states that she saw Dr. HYMAN outpatient for this and he scoped and said she had vocal cord paralysis.  She saw him in f/u but she doesn't think any plan for treatment.  Feels ok otherwise.      ROS:  Gen- No fevers, chills  CV- No chest pain, palpitations  Resp- No cough, dyspnea  GI- No N/V/D, abd pain        Objective   Objective     Vital Signs:   Temp:  [96.6 °F (35.9 °C)-98.5 °F (36.9 °C)] 96.6 °F (35.9 °C)  Heart Rate:  [67-92] 67  Resp:  [16-18] 16  BP: ()/(50-58) 94/54     Physical Exam:  GEN: NAD, resting in chair, awake, sitting up in chair, daughter at bedside  HEENT: on room air, atraumatic, normocephalic  NECK: supple, no masses  RESP: on room air, normal effort  CV: on tele, sinus rhythm  PSYCH: normal affect, appropriate  NEURO: awake, alert, no focal deficits noted  MSK: no edema noted  SKIN: no rashes noted       Results Reviewed:  LAB RESULTS:      Lab 07/15/22  0659 22  1419 22  0439 22  2047 22  0949   WBC 7.40  --  8.03  --  9.65   HEMOGLOBIN 11.2* 12.2 12.1 12.3 13.0   HEMATOCRIT 33.4* 36.8 36.3 36.7 38.3   PLATELETS 424  --  464*  --  461*   NEUTROS ABS 5.72  --  6.40  --  8.04*   IMMATURE GRANS (ABS) 0.03  --  0.04  --  0.03   LYMPHS ABS 0.65*  --  0.63*  --  0.50*   MONOS ABS 0.66  --  0.66  --  0.74   EOS ABS 0.31  --  0.27  --  0.28   MCV 86.8  --  87.1  --  85.9   PROCALCITONIN  --   --   --   --  0.17   LACTATE  --   --   --   --  1.2   PROTIME  --   --   --   --  15.0*   APTT  --   --   --   --  38.7*   HEPARIN ANTI-XA  --   --   --   --  0.10*         Lab 07/15/22  0659 22  0439 22  0949   SODIUM 143 142 143   POTASSIUM 4.0 4.1 3.8    CHLORIDE 106 102 101   CO2 29.0 33.0* 33.0*   ANION GAP 8.0 7.0 9.0   BUN 28* 32* 34*   CREATININE 0.99 1.17* 1.33*   EGFR 57.0* 46.7* 40.0*   GLUCOSE 77 75 92   CALCIUM 8.6 9.4 10.1   HEMOGLOBIN A1C  --  5.60  --    TSH  --  11.760*  --          Lab 07/15/22  0659 07/13/22  0949   TOTAL PROTEIN 4.8* 6.5   ALBUMIN 2.60* 3.30*   GLOBULIN 2.2 3.2   ALT (SGPT) 8 10   AST (SGOT) 21 27   BILIRUBIN 1.1 1.5*   ALK PHOS 77 89         Lab 07/13/22  0949   PROBNP >70,000.0*   TROPONIN T 0.022   PROTIME 15.0*   INR 1.19*         Lab 07/14/22  0439   CHOLESTEROL 113   LDL CHOL 75   HDL CHOL 15*   TRIGLYCERIDES 123         Lab 07/14/22  0439   ABO TYPING B   RH TYPING Negative   ANTIBODY SCREEN Negative         Brief Urine Lab Results  (Last result in the past 365 days)      Color   Clarity   Blood   Leuk Est   Nitrite   Protein   CREAT   Urine HCG        06/23/22 1012 Yellow   Slightly Cloudy   Negative   Moderate (2+)   Negative   Trace                 Microbiology Results Abnormal     Procedure Component Value - Date/Time    Blood Culture - Blood, Arm, Left [[id number]]  (Normal) Collected: 07/13/22 1242    Lab Status: Preliminary result Specimen: Blood from Arm, Left Updated: 07/14/22 1318     Blood Culture No growth at 24 hours    COVID PRE-OP / PRE-PROCEDURE SCREENING ORDER (NO ISOLATION) - Swab, Nasopharynx [[id number]]  (Normal) Collected: 07/13/22 1643    Lab Status: Final result Specimen: Swab from Nasopharynx Updated: 07/13/22 1723    Narrative:      The following orders were created for panel order COVID PRE-OP / PRE-PROCEDURE SCREENING ORDER (NO ISOLATION) - Swab, Nasopharynx.  Procedure                               Abnormality         Status                     ---------                               -----------         ------                     COVID-19 and FLU A/B PCR...[[id number]]  Normal              Final result                 Please view results for these tests on the individual orders.    COVID-19 and FLU  A/B PCR - Swab, Nasopharynx [[id number]]  (Normal) Collected: 07/13/22 1643    Lab Status: Final result Specimen: Swab from Nasopharynx Updated: 07/13/22 1723     COVID19 Not Detected     Influenza A PCR Not Detected     Influenza B PCR Not Detected    Narrative:      Fact sheet for providers: https://www.fda.gov/media/721884/download    Fact sheet for patients: https://www.fda.gov/media/104183/download    Test performed by PCR.          Adult Transthoracic Echo Complete W/ Cont if Necessary Per Protocol    Result Date: 7/14/2022  · The left ventricular cavity is borderline dilated. · Left ventricular wall thickness is consistent with mild concentric hypertrophy. · Moderate pulmonic valve regurgitation is present. · Left atrial volume is moderately increased. · Severe mitral valve regurgitation is present. Pulmonary vein flow reversal is present. · Estimated right ventricular systolic pressure from tricuspid regurgitation is mildly elevated (35-45 mmHg). · The following left ventricular wall segments are hypokinetic: mid anterior, apical anterior, basal anterolateral, mid anterolateral, apical lateral, basal inferolateral, mid inferolateral, apical inferior, mid inferior, apical septal, basal inferoseptal, mid inferoseptal, apex hypokinetic, mid anteroseptal, basal anterior, basal inferior and basal inferoseptal. · Estimated left ventricular EF = 40% Estimated left ventricular EF was in agreement with the calculated left ventricular EF. Left ventricular ejection fraction appears to be 36 - 40%. Left ventricular systolic function is mildly decreased. · Left ventricular diastolic function is consistent with (grade II w/high LAP) pseudonormalization. · There is bileaflet mitral valve thickening present. · Mild to moderate pulmonary hypertension is present. · There is a trivial circumferential pericardial effusion. · Overall, no significant change from 2 June 2022 echocardiographic study report.      FL Video Swallow  With Speech Single Contrast    Result Date: 7/14/2022  EXAMINATION: FL VIDEO SWALLOW W SPEECH SINGLE-CONTRAST-  INDICATION: dysphagia; R04.2-Hemoptysis; I26.99-Other pulmonary embolism without acute cor pulmonale; C22.0-Liver cell carcinoma; J18.9-Pneumonia, unspecified organism; C22.0-Liver cell carcinoma; C78.6-Secondary malignant neoplasm of retroperitoneum and peritoneum  TECHNIQUE: 18 seconds of fluoroscopic time was used for this exam. 6 associated fluoroscopic loops were saved. The patient was evaluated in the seated lateral position while taking a variety of consistencies of barium by mouth under the direction of speech pathology.  COMPARISON: NONE  FINDINGS: 18 seconds of fluoroscopy provided for a modified barium swallow. Please see speech therapy report for full details and recommendations.       Impression: Fluoroscopy provided for a modified barium swallow. Please see speech therapy report for full details and recommendations.    This report was finalized on 7/14/2022 3:50 PM by Manuel Lee.      XR Chest 1 View    Result Date: 7/13/2022  XR CHEST 1 VW-  Date of Exam: 7/13/2022 10:27 AM  Indication: SOA triage protocol.  Comparison Exams: CT chest from July 11, 2022  Technique: Single AP chest radiograph  FINDINGS: There is a right basilar consolidation and a small right pleural effusion. The heart is enlarged. There is pulmonary vascular congestion. The osseous structures appear intact.      Impression: 1.  Right basilar consolidation, which could represent atelectasis or pneumonia. 2.  Pulmonary vascular congestion with small right pleural effusion. 3.  Cardiomegaly.  This report was finalized on 7/13/2022 10:45 AM by Alfonzo Bunn MD.      Duplex Venous Lower Extremity - Bilateral CAR    Result Date: 7/13/2022  · Sub-acute left lower extremity deep vein thrombosis noted in the common femoral and proximal femoral. · Acute right lower extremity deep vein thrombosis noted in the peroneal. ·  Acute left lower extremity deep vein thrombosis noted in the peroneal and soleal. · All other veins appeared normal bilaterally.        Results for orders placed during the hospital encounter of 07/13/22    Adult Transthoracic Echo Complete W/ Cont if Necessary Per Protocol    Interpretation Summary  · The left ventricular cavity is borderline dilated.  · Left ventricular wall thickness is consistent with mild concentric hypertrophy.  · Moderate pulmonic valve regurgitation is present.  · Left atrial volume is moderately increased.  · Severe mitral valve regurgitation is present. Pulmonary vein flow reversal is present.  · Estimated right ventricular systolic pressure from tricuspid regurgitation is mildly elevated (35-45 mmHg).  · The following left ventricular wall segments are hypokinetic: mid anterior, apical anterior, basal anterolateral, mid anterolateral, apical lateral, basal inferolateral, mid inferolateral, apical inferior, mid inferior, apical septal, basal inferoseptal, mid inferoseptal, apex hypokinetic, mid anteroseptal, basal anterior, basal inferior and basal inferoseptal.  · Estimated left ventricular EF = 40% Estimated left ventricular EF was in agreement with the calculated left ventricular EF. Left ventricular ejection fraction appears to be 36 - 40%. Left ventricular systolic function is mildly decreased.  · Left ventricular diastolic function is consistent with (grade II w/high LAP) pseudonormalization.  · There is bileaflet mitral valve thickening present.  · Mild to moderate pulmonary hypertension is present.  · There is a trivial circumferential pericardial effusion.  · Overall, no significant change from 2 June 2022 echocardiographic study report.      I have reviewed the medications:  Scheduled Meds:budesonide, 0.5 mg, Nebulization, BID - RT  carvedilol, 6.25 mg, Oral, BID  docusate sodium, 100 mg, Oral, BID  gabapentin, 100 mg, Oral, Q12H  lidocaine, 1 patch, Transdermal, Q24H  sodium  chloride, 10 mL, Intravenous, Q12H  spironolactone, 12.5 mg, Oral, Daily  torsemide, 20 mg, Oral, Daily      Continuous Infusions:   PRN Meds:.•  acetaminophen **OR** acetaminophen **OR** acetaminophen  •  guaifenesin-dextromethorphan  •  ondansetron  •  promethazine-codeine  •  simethicone  •  sodium chloride  •  sodium chloride    Assessment & Plan   Assessment & Plan     Active Hospital Problems    Diagnosis  POA   • Hemoptysis [R04.2]  Yes   • Stage 3b chronic kidney disease (HCC) [N18.32]  Unknown   • 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 difficile diarrhea [A04.72]  Yes   • PVC's (premature ventricular contractions) [I49.3]  Yes   • Essential hypertension [I10]  Yes      Resolved Hospital Problems   No resolved problems to display.        Brief Hospital Course to date:  Ms. Forte is an 83 yo F with PMH of CHF, HLD, PVCs and Metastatic Hepatocellular Carcinoma followed by Dr. Pollock who presented to the ED with hemoptysis.  Pt was found to have small pulmonary emboli.  We were asked to admit for further management.     This patient's problems and plans were partially entered by my partner and updated as appropriate by me 07/15/22.         Hemoptysis  Bilateral Pulmonary Emboli  Acute and chronic DVT   Right basilar consolidation  -- Pulmonary has seen, do not recommend anticoagulation at this point due to hemoptysis  -- monitor H&H  -- suspect right basilar consolidation is infarction vs metastatic mass vs (less likely) infection.  -- defer ABX for time being  -- supportive care, wean supplemental O2 as tolerated  -- consult Palliative for possible Hospice referral  -- check TTE   -- duplex noted with DVT.  Dr Pollock advised against proceeding with IVC filter at this time d/t  Since her risk of having IVC clot would be fairly high.  Dr. Jj did see patient and discussed with her as well.  All agreed to not proceed with IVC filter.     HFrEF  -- most  recent TTE from June 2022 showed an EF of 32% with diastolic dysfunction noted as well  -- proBNP significantly elevated on admission at 70K but suspect this may due to right heart strain in setting of above  --echo done 7/14/22 that showed EF 40% and mild diastolic dysfunction.  -- continue home diuretics for time being, monitor closely for HD instability given above     Metastatic HCC  --followed by Dr. Pollock, courtesy consult him. He states that she has slow progressive disease since her scans in December.  Plans to try a second line therapy/single agent levatinib/if has intolerance issues or progression with this treatment then he will place to place her on hospice care  --Palliative following     Frequent PVCs  -- continue home meds  -- replace electrolytes as indicated    +blood cx  --noted from admission with  Coag negative staph. Favor contaminant     CKD   -- stage IIIb  -- Cr at baseline, monitor    Elevated TSH  --Free T4 wnl.  Likely subclinical hypothyroid, could still consider treating with replacement therapy with a TSH greater than 10, but will have patient f/u for repeat TSH with PCP and leave per PCP discretion     DVT prophylaxis:  mechanical  Expected Discharge Location and Transportation: home with   Expected Discharge Date: 7/17    DVT prophylaxis:  Mechanical DVT prophylaxis orders are present.     AM-PAC 6 Clicks Score (PT): 18 (07/14/22 0800)    CODE STATUS:   Code Status and Medical Interventions:   Ordered at: 07/13/22 1345     Medical Intervention Limits:    NO intubation (DNI)    NO cardioversion    NO dialysis    NO antibiotics    NO artificial nutrition    NO vasopressors     Level Of Support Discussed With:    Patient     Code Status (Patient has no pulse and is not breathing):    No CPR (Do Not Attempt to Resuscitate)     Medical Interventions (Patient has pulse or is breathing):    Limited Support       Chucky Navarrete MD  07/15/22

## 2022-07-15 NOTE — THERAPY EVALUATION
Patient Name: Desiree Forte  : 1940    MRN: [id number]                              Today's Date: 7/15/2022       Admit Date: 2022    Visit Dx:     ICD-10-CM ICD-9-CM   1. Hemoptysis  R04.2 786.30   2. Bilateral pulmonary embolism (HCC)  I26.99 415.19   3. Cancer, hepatocellular (HCC)  C22.0 155.0   4. Pneumonia of right lower lobe due to infectious organism  J18.9 486   5. 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)   • Hemoptysis   • Stage 3b chronic kidney disease (HCC)     Past Medical History:   Diagnosis Date   • Abnormal heart rhythm    • Cancer (HCC)     breast   • Clostridioides difficile diarrhea    • Congestive heart failure (CHF) (HCC)    • Hyperlipidemia    • Irregular heart beat    • Migraine     occular      Past Surgical History:   Procedure Laterality Date   • APPENDECTOMY     • BREAST SURGERY      lumpectomy with lymph nodes left    • COLONOSCOPY     • COLONOSCOPY N/A 2021    Procedure: COLONOSCOPY;  Surgeon: Jesus Morton MD;  Location: [facility] ENDOSCOPY;  Service: Gastroenterology;  Laterality: N/A;   • DIAGNOSTIC LAPAROSCOPY N/A 9/15/2021    Procedure: DIAGNOSTIC LAPAROSCOPY, OMENTAL BIOPSY AND PERITONEAL NODULE BIOPSY;  Surgeon: Nino Jaimes MD;  Location: [facility] OR;  Service: General;  Laterality: N/A;   • ENDOSCOPY N/A 2021    Procedure: ESOPHAGOGASTRODUODENOSCOPY WITH BIOPSY AND POLYPECTOMY;  Surgeon: Jesus Morton MD;  Location:  [facility] ENDOSCOPY;  Service: Gastroenterology;  Laterality: N/A;   • EYE SURGERY Bilateral     cataract   • HYSTERECTOMY        General Information     Row Name 07/15/22 1338          Physical  Therapy Time and Intention    Document Type evaluation (P)   -WJ     Mode of Treatment physical therapy (P)   -W     Row Name 07/15/22 1333          General Information    Patient Profile Reviewed yes (P)   -WJ     Prior Level of Function independent:;all household mobility;community mobility;gait;transfer;bed mobility;ADL's (P)   -WJ     Existing Precautions/Restrictions fall (P)   -WJ     Barriers to Rehab medically complex;previous functional deficit (P)   -W     Row Name 07/15/22 1338          Living Environment    People in Home significant other (P)   -     Row Name 07/15/22 1338          Home Main Entrance    Number of Stairs, Main Entrance none (P)   -W     Stair Railings, Main Entrance none (P)   -     Row Name 07/15/22 1338          Stairs Within Home, Primary    Stairs, Within Home, Primary No stairs to enter home, home is single story (P)   -     Row Name 07/15/22 1333          Cognition    Orientation Status (Cognition) oriented x 4 (P)   -     Row Name 07/15/22 1339          Safety Issues, Functional Mobility    Safety Issues Affecting Function (Mobility) awareness of need for assistance;insight into deficits/self-awareness;safety precaution awareness;safety precautions follow-through/compliance (P)   -WJ     Impairments Affecting Function (Mobility) balance;endurance/activity tolerance;strength;postural/trunk control (P)   -WJ           User Key  (r) = Recorded By, (t) = Taken By, (c) = Cosigned By    Initials Name Provider Type    W Santo Smith, PT Student PT Student               Mobility     Row Name 07/15/22 4266          Bed Mobility    Bed Mobility supine-sit (P)   -WJ     Supine-Sit [place] (Bed Mobility) minimum assist (75% patient effort);1 person assist (P)   -WJ     Assistive Device (Bed Mobility) bed rails;head of bed elevated (P)   -WJ     Comment, (Bed Mobility) pt needs A w/ bringing trunk into sitting position during transfer; needing cueing to bring B LE to EOB  (P)   -WJ     Row Name 07/15/22 1339          Sit-Stand Transfer    Sit-Stand [place] (Transfers) standby assist;1 person assist (P)   -WJ     Assistive Device (Sit-Stand Transfers) -- (P)   No AD used  -WJ     Comment, (Sit-Stand Transfer) Pt w/ good ability to perform STS, but increased time required (P)   -W     Row Name 07/15/22 1339          Gait/Stairs (Locomotion)    [place] Level (Gait) contact guard;1 person assist (P)   -WJ     Assistive Device (Gait) walker, front-wheeled;other (see comments) (P)   Ambulationg initially began w/ R UE HHA, but transitioned to RW  -WJ     Distance in Feet (Gait) 400 (P)   -WJ     Deviations/Abnormal Patterns (Gait) bilateral deviations;base of support, narrow;ivon decreased;gait speed decreased;stride length decreased (P)   -WJ     Bilateral Gait Deviations forward flexed posture;heel strike decreased (P)   -WJ     [place] Level (Stairs) not tested (P)   -WJ     Comment, (Gait/Stairs) Pt ambulated w/ step through gait, reduced speed and stride length, and flexed forward posture; requiring cueing for correction w/ pt responding well to cueing. Ambulation initially began w/ R UE HHA, but transitioned to RW d/t increased unsteadiness noted during ambulation. (P)   -WJ           User Key  (r) = Recorded By, (t) = Taken By, (c) = Cosigned By    Initials Name Provider Type    WJ Santo Smith, PT Student PT Student               Obj/Interventions     Row Name 07/15/22 1344          Range of Motion Comprehensive    General Range of Motion no range of motion deficits identified (P)   -WJ     Row Name 07/15/22 1344          Strength Comprehensive (MMT)    General Manual Muscle Testing (MMT) Assessment lower extremity strength deficits identified (P)   -WJ     Comment, General Manual Muscle Testing (MMT) Assessment B LE grossly 3+/5 (P)   -WJ     Row Name 07/15/22 1344          Balance    Balance Assessment sitting static balance;sitting dynamic balance;sit to  stand dynamic balance;standing static balance;standing dynamic balance (P)   -WJ     Static Sitting Balance independent (P)   -WJ     Dynamic Sitting Balance independent (P)   -WJ     Position, Sitting Balance unsupported;sitting edge of bed (P)   -WJ     Sit to Stand Dynamic Balance contact guard;1-person assist;verbal cues (P)   -WJ     Static Standing Balance contact guard;1-person assist (P)   -WJ     Dynamic Standing Balance contact guard;1-person assist (P)   -WJ     Position/Device Used, Standing Balance supported;walker, front-wheeled (P)   -WJ     Balance Interventions sitting;standing;sit to stand;static;dynamic;moderate challenge (P)   -WJ     Comment, Balance Pt w/ reduced balance when unsupported in standing; improved balance noted when pt utilizing a RW (P)   -WJ     Row Name 07/15/22 0381          Sensory Assessment (Somatosensory)    Sensory Assessment (Somatosensory) sensation intact (P)   -WJ           User Key  (r) = Recorded By, (t) = Taken By, (c) = Cosigned By    Initials Name Provider Type    WJ Santo Smith, PT Student PT Student               Goals/Plan     Row Name 07/15/22 1350          Bed Mobility Goal 1 (PT)    Activity/Assistive Device (Bed Mobility Goal 1, PT) sit to supine/supine to sit (P)   -WJ     [place] Level/Cues Needed (Bed Mobility Goal 1, PT) modified independence (P)   -WJ     Time Frame (Bed Mobility Goal 1, PT) long term goal (LTG);10 days (P)   -WJ     Row Name 07/15/22 1350          Transfer Goal 1 (PT)    Activity/Assistive Device (Transfer Goal 1, PT) sit-to-stand/stand-to-sit;walker, rolling (P)   -WJ     [place] Level/Cues Needed (Transfer Goal 1, PT) modified independence (P)   -WJ     Time Frame (Transfer Goal 1, PT) long term goal (LTG);10 days (P)   -WJ     Row Name 07/15/22 1350          Gait Training Goal 1 (PT)    Activity/Assistive Device (Gait Training Goal 1, PT) gait (walking locomotion);assistive device use;walker, rolling (P)   -WJ      [place] Level (Gait Training Goal 1, PT) modified independence (P)   -WJ     Distance (Gait Training Goal 1, PT) 500 (P)   -WJ     Time Frame (Gait Training Goal 1, PT) long term goal (LTG);10 days (P)   -WJ     Row Name 07/15/22 1350          Therapy Assessment/Plan (PT)    Planned Therapy Interventions (PT) balance training;bed mobility training;gait training;home exercise program;postural re-education;patient/family education;neuromuscular re-education;strengthening;transfer training (P)   -WJ           User Key  (r) = Recorded By, (t) = Taken By, (c) = Cosigned By    Initials Name Provider Type    WJ Santo Smith, PT Student PT Student               Clinical Impression     Row Name 07/15/22 1346          Pain    Pretreatment Pain Rating 0/10 - no pain (P)   -WJ     Posttreatment Pain Rating 0/10 - no pain (P)   -WJ     Row Name 07/15/22 1346          Plan of Care Review    Plan of Care Reviewed With patient;daughter (P)   -WJ     Progress no change (P)   -WJ     Outcome Evaluation PT evaluation completed. Pt presents w/ deficits including reduced activity tolerance, impaired balance, generalized weakness, and reduced functional mobility limiting independence. Pt ambulated 400 ft CGA x 1 using RW; requiring cueing for gait mechanics and posture. Pt w/ good safety awareness. Aulation initially began w/ R UE HHA, but transitioned to RW d/t increased unsteadiness noted during ambulation. Pt will cont to benefit from IPPT. PT rec d/c home w/ assist pending medical appropriateness. (P)   -WJ     Row Name 07/15/22 1349          Therapy Assessment/Plan (PT)    Patient/Family Therapy Goals Statement (PT) to go home (P)   -WJ     Rehab Potential (PT) good, to achieve stated therapy goals (P)   -WJ     Criteria for Skilled Interventions Met (PT) yes (P)   -WJ     Therapy Frequency (PT) daily (P)   -WJ     Row Name 07/15/22 1346          Vital Signs    Pre Systolic BP Rehab -- (P)   VSS  -WJ     O2 Delivery Pre  Treatment room air (P)   -WJ     O2 Delivery Intra Treatment room air (P)   -WJ     O2 Delivery Post Treatment room air (P)   -WJ     Pre Patient Position Supine (P)   -WJ     Intra Patient Position Standing (P)   -WJ     Post Patient Position Sitting (P)   -WJ     Row Name 07/15/22 1346          Positioning and Restraints    Pre-Treatment Position in bed (P)   -WJ     Post Treatment Position chair (P)   -WJ     In Chair notified nsg;reclined;sitting;call light within reach;encouraged to call for assist;with family/caregiver;waffle cushion (P)   RN aware  -WJ           User Key  (r) = Recorded By, (t) = Taken By, (c) = Cosigned By    Initials Name Provider Type    Santo Cox, PT Student PT Student               Outcome Measures     Row Name 07/15/22 1351 07/15/22 0800       How much help from another person do you currently need...    Turning from your back to your side while in flat bed without using bedrails? 3 (P)   -WJ 3  -AN    Moving from lying on back to sitting on the side of a flat bed without bedrails? 3 (P)   -WJ 3  -AN    Moving to and from a bed to a chair (including a wheelchair)? 3 (P)   -WJ 3  -AN    Standing up from a chair using your arms (e.g., wheelchair, bedside chair)? 3 (P)   -WJ 3  -AN    Climbing 3-5 steps with a railing? 3 (P)   -WJ 3  -AN    To walk in hospital room? 3 (P)   -WJ 3  -AN    AM-PAC 6 Clicks Score (PT) 18 (P)   -WJ 18  -AN    Highest level of mobility 6 --> Walked 10 steps or more (P)   -WJ 6 --> Walked 10 steps or more  -AN    Row Name 07/15/22 1351          Functional Assessment    Outcome Measure Options AM-PAC 6 Clicks Basic Mobility (PT) (P)   -WJ           User Key  (r) = Recorded By, (t) = Taken By, (c) = Cosigned By    Initials Name Provider Type    Micaela Cohen RN Registered Nurse    Santo Cox, PT Student PT Student                             Physical Therapy Education                 Title: PT OT SLP Therapies (In Progress)     Topic:  Physical Therapy (In Progress)     Point: Mobility training (Done)     Learning Progress Summary           Patient Acceptance, E, VU,DU by  at 7/15/2022 1352                   Point: Home exercise program (Not Started)     Learner Progress:  Not documented in this visit.          Point: Body mechanics (Done)     Learning Progress Summary           Patient Acceptance, E, VU,DU by  at 7/15/2022 1352                   Point: Precautions (Done)     Learning Progress Summary           Patient Acceptance, E, VU,DU by  at 7/15/2022 1352                               User Key     Initials Effective Dates Name Provider Type Discipline     05/31/22 -  Santo Smith, PT Student PT Student PT              PT Recommendation and Plan  Planned Therapy Interventions (PT): (P) balance training, bed mobility training, gait training, home exercise program, postural re-education, patient/family education, neuromuscular re-education, strengthening, transfer training  Plan of Care Reviewed With: (P) patient, daughter  Progress: (P) no change  Outcome Evaluation: (P) PT evaluation completed. Pt presents w/ deficits including reduced activity tolerance, impaired balance, generalized weakness, and reduced functional mobility limiting independence. Pt ambulated 400 ft CGA x 1 using RW; requiring cueing for gait mechanics and posture. Pt w/ good safety awareness. Aulation initially began w/ R UE HHA, but transitioned to RW d/t increased unsteadiness noted during ambulation. Pt will cont to benefit from IPPT. PT rec d/c home w/ assist pending medical appropriateness.     Time Calculation:    PT Charges     Row Name 07/15/22 5558             Time Calculation    Start Time 1130 (P)   -WJ      PT Received On 07/15/22 (P)   -      PT Goal Re-Cert Due Date 07/25/22 (P)   -J              Time Calculation- PT    Total Timed Code Minutes- PT 15 minute(s) (P)   -J              Timed Charges    88448 - PT Therapeutic Activity Minutes 15  (P)   -WJ              Untimed Charges    PT Eval/Re-eval Minutes 35 (P)   -WJ              Total Minutes    Timed Charges Total Minutes 15 (P)   -WJ      Untimed Charges Total Minutes 35 (P)   -WJ       Total Minutes 50 (P)   -WJ            User Key  (r) = Recorded By, (t) = Taken By, (c) = Cosigned By    Initials Name Provider Type    WSanto Head, PT Student PT Student              Therapy Charges for Today     Code Description Service Date Service Provider Modifiers Qty    46890576670 HC PT THERAPEUTIC ACT EA 15 MIN 7/15/2022 Santo Smith, PT Student GP 1    15175423829 HC PT EVAL MOD COMPLEXITY 3 7/15/2022 Santo Smith, PT Student GP 1          PT G-Codes  Outcome Measure Options: (P) AM-PAC 6 Clicks Basic Mobility (PT)  AM-PAC 6 Clicks Score (PT): (P) 18  AM-PAC 6 Clicks Score (OT): 24    SANTO SMITH PT Student  7/15/2022

## 2022-07-15 NOTE — PROGRESS NOTES
"Palliative Care Daily Progress Note     C/C: Patient complains of discomfort to left side of neck.    S: Medical record reviewed. Follow up visit for medication effectiveness and GOC in the context of complex medical decision making. Events noted. Patient reports no improvement in left sided neck pain, which she describes as \"a soft vibration, like sandpaper over a sore\" with Neurontin. Discussed discontinuation of Neurontin tomorrow if not effective by that time. Patient confirms vocal cord paralysis per ENT. Uses chloraseptic spray which helps with pain but does not \"cure\". Declines chloraseptic spray prn. Patient confirms her goal is to continue to seek treatment for hepatocellular carcinoma. Discussed referral to Palliative Clinic. Patient reports that she does prefer the Promethazine/Codeine at night as it allows her to sleep well. She does not want narcotics for pain management especially during the day.     ROS: +pain, left-side neck, constant, worse with talking, no improvement with Neurontin. see HPI for further description. Denies right sided pain today. Denies nausea, SOA, anxiety, constipation. All other ROS negative.     O: Code Status:   Code Status and Medical Interventions:   Ordered at: 07/13/22 1345     Medical Intervention Limits:    NO intubation (DNI)    NO cardioversion    NO dialysis    NO antibiotics    NO artificial nutrition    NO vasopressors     Level Of Support Discussed With:    Patient     Code Status (Patient has no pulse and is not breathing):    No CPR (Do Not Attempt to Resuscitate)     Medical Interventions (Patient has pulse or is breathing):    Limited Support      Advanced Directives: Advance Directive Status:  (copy provided by pt and family, faxed to HIM)   Goals of Care: Ongoing.   Palliative Performance Scale Score:  50%    BP 94/54 (BP Location: Right arm, Patient Position: Sitting)   Pulse 67   Temp 96.6 °F (35.9 °C) (Oral)   Resp 16   Ht 162.6 cm (64\")   Wt 45.4 kg " (100 lb)   SpO2 (!) 89%   BMI 17.16 kg/m²     Intake/Output Summary (Last 24 hours) at 7/15/2022 1123  Last data filed at 7/14/2022 2000  Gross per 24 hour   Intake 100 ml   Output --   Net 100 ml       PE:  General Appearance:    Patient sitting up in bed, awake, alert, appears stated age, cooperative, NAD   HEENT:    NC/AT, EOMI, anicteric, MMM, face relaxed   Neck:   supple, trachea midline, no JVD   Lungs:     CTA bilat, diminished in bases; respirations regular, even and unlabored; RR 18-22 on exam, on RA    Heart:    RRR, normal S1 and S2, murmur present, HR 67   Abdomen:     Hyperactive bowel sounds, soft, non-tender, mildly distended   G/U:   Deferred   MSK/Extremities:   Wasting, no edema   Pulses:   Pulses palpable and equal bilaterally   Skin:   Warm, dry   Neurologic:   A/Ox3, cooperative, DAVIES   Psych:   Calm, appropriate       Meds: Reviewed and changes noted    Labs:   Results from last 7 days   Lab Units 07/15/22  0659   WBC 10*3/mm3 7.40   HEMOGLOBIN g/dL 11.2*   HEMATOCRIT % 33.4*   PLATELETS 10*3/mm3 424     Results from last 7 days   Lab Units 07/15/22  0659   SODIUM mmol/L 143   POTASSIUM mmol/L 4.0   CHLORIDE mmol/L 106   CO2 mmol/L 29.0   BUN mg/dL 28*   CREATININE mg/dL 0.99   GLUCOSE mg/dL 77   CALCIUM mg/dL 8.6     Results from last 7 days   Lab Units 07/15/22  0659   SODIUM mmol/L 143   POTASSIUM mmol/L 4.0   CHLORIDE mmol/L 106   CO2 mmol/L 29.0   BUN mg/dL 28*   CREATININE mg/dL 0.99   CALCIUM mg/dL 8.6   BILIRUBIN mg/dL 1.1   ALK PHOS U/L 77   ALT (SGPT) U/L 8   AST (SGOT) U/L 21   GLUCOSE mg/dL 77     Imaging Results (Last 72 Hours)     Procedure Component Value Units Date/Time    FL Video Swallow With Speech Single Contrast [[id number]] Collected: 07/14/22 1516     Updated: 07/14/22 1553    Narrative:      EXAMINATION: FL VIDEO SWALLOW W SPEECH SINGLE-CONTRAST-     INDICATION: dysphagia; R04.2-Hemoptysis; I26.99-Other pulmonary embolism  without acute cor pulmonale; C22.0-Liver cell  carcinoma;  J18.9-Pneumonia, unspecified organism; C22.0-Liver cell carcinoma;  C78.6-Secondary malignant neoplasm of retroperitoneum and peritoneum     TECHNIQUE: 18 seconds of fluoroscopic time was used for this exam. 6  associated fluoroscopic loops were saved. The patient was evaluated in  the seated lateral position while taking a variety of consistencies of  barium by mouth under the direction of speech pathology.     COMPARISON: NONE     FINDINGS: 18 seconds of fluoroscopy provided for a modified barium  swallow. Please see speech therapy report for full details and  recommendations.          Impression:      Fluoroscopy provided for a modified barium swallow. Please  see speech therapy report for full details and recommendations.         This report was finalized on 7/14/2022 3:50 PM by Manuel Lee.       XR Chest 1 View [[id number]] Collected: 07/13/22 1038     Updated: 07/13/22 1048    Narrative:      XR CHEST 1 VW-     Date of Exam: 7/13/2022 10:27 AM     Indication: SOA triage protocol.     Comparison Exams: CT chest from July 11, 2022     Technique: Single AP chest radiograph     FINDINGS:  There is a right basilar consolidation and a small right pleural  effusion. The heart is enlarged. There is pulmonary vascular congestion.  The osseous structures appear intact.       Impression:      1.  Right basilar consolidation, which could represent atelectasis or  pneumonia.  2.  Pulmonary vascular congestion with small right pleural effusion.  3.  Cardiomegaly.     This report was finalized on 7/13/2022 10:45 AM by Alfonzo Bunn MD.               Lab 07/14/22  0439   HEMOGLOBIN A1C 5.60         Diagnostics: Reviewed    A:   Patient Active Problem List   Diagnosis   • BRUNO (dyspnea on exertion)   • PVC's (premature ventricular contractions)   • Essential hypertension   • At risk for sleep apnea   • C. 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 female with PE, DVT's, hepatocellular carcinoma.     S/S:   1. Pain -chronic left-side neck pain potentially neuropathic, due to vocal cord paralysis  -increased Gabapentin to 200mg PO BID   -continue Tylenol 650mg PO q4 hours prn  -patient declines narcotic pain management due to previous \"stoned\" adverse reactions  -acute onset, right sided pain with radiation to shoulder, MSK versus PE  -apply Lidoderm patch to right side  -may use Tylenol for pain     2. SOA/Cough -encourage increased oral fluids to thin secretions  -Promethazine/Codeine restarted due to patient's request per Primary  -continue Mucinex-DM for thinning secretions and cough  -patient reports most relief with Advair inhaler and Budesonide neb treatments     3. Nausea -continue Zofran 8mg PO q 8 hours prn     4. Gas -started Simethicone 80mg PO four times daily prn     5. Debility -PT/OT following     6. Dysphagia -SLP with recommendations for soft textures and thin liquids     7. Constipation -prevention  -continue Docusate Sodium 100mg PO BID per home regimen     8. GOC -DNR/DNI -per review of charts and LW  -copy of LW/ACP received from daughter   -symptom management as above and discussed with patient and daughter  -patient reports she is continuing treatment per Oncology, declines hospice services at this time    P: Discussed above symptoms and adjustments as above. Patient's goals are to continue the second line therapy, lenvatinib, as suggested by Oncology. She declines hospice services at this time. She would consider Palliative to follow outpatient at clinic, however, does not feel her symptom burden requires palliative at this time. Discussed how she could be referred to Palliative Clinic after discharge if she were to choose. All questions and concerns addressed.   Palliative Care Team will continue to follow patient. Please do not hesitate to contact us regarding " further sx mgmt or GOC needs.  Cinthya Foster, APRN  7/15/2022  Time spent: 30 minutes

## 2022-07-15 NOTE — PROGRESS NOTES
"                    Clinical Nutrition     Patient Name: Desiree Forte  YOB: 1940  MRN: [id number]  Date of Encounter: 07/15/22 11:32 EDT  Admission date: 7/13/2022    Reason for Visit   Identified at risk by screening criteria, MST score 2+    EMR Reviewed: Yes       Diet Nutrition Related History:    Patient reported she had lost 30 lbs over the last 5 months, however, the recorded weights dispute this. Her bed scale weight today was 111.7 lbs, which could be incorrect.  The last standing scale weight was 110 lbs on 9/21/2022. She attributed her weight loss to her cancer. She states her appetite has remained good but she does have a slight swallowing issue. SLP did a MBS on 7/14/2022 and determined she needs a soft diet but she can also choose from the regular menu.       Current Nutrition Prescription     Diet Regular; GI Soft    Orders Placed This Encounter      Dietary Nutrition Supplements Boost Plus      Average Intake from Charting:     Insufficient documentation    Intake History:     Intake Category  Unable to determine at this time.    Anthropometrics   Height: Height: 162.6 cm (64\")  Admission Weight:   Flowsheet Rows    Flowsheet Row First Filed Value   Admission Height 162.6 cm (64\") Documented at 07/13/2022 0917   Admission Weight 45.4 kg (100 lb) Documented at 07/13/2022 0917        Last Filed Weight: Weight: 45.4 kg (100 lb) (07/13/22 1416)  BMI: BMI (Calculated): 17.2  BMI classification: Underweight:<18.5kg/m2   IBW: Ideal Body Weight (IBW) (kg): 55      Weight Loss Criteria  Acute:   N/A    Chronic/Social Environmental   N/A    Actions   Criteria for further malnutrition exam not met at this time. Follow per protocol.        Amalia Perez,   Time Spent: 20 minutes  "

## 2022-07-16 LAB
ALBUMIN SERPL-MCNC: 2.8 G/DL (ref 3.5–5.2)
ALBUMIN/GLOB SERPL: 0.9 G/DL
ALP SERPL-CCNC: 82 U/L (ref 39–117)
ALT SERPL W P-5'-P-CCNC: 10 U/L (ref 1–33)
ANION GAP SERPL CALCULATED.3IONS-SCNC: 6 MMOL/L (ref 5–15)
AST SERPL-CCNC: 27 U/L (ref 1–32)
BILIRUB SERPL-MCNC: 1.2 MG/DL (ref 0–1.2)
BUN SERPL-MCNC: 31 MG/DL (ref 8–23)
BUN/CREAT SERPL: 25.4 (ref 7–25)
CALCIUM SPEC-SCNC: 8.8 MG/DL (ref 8.6–10.5)
CHLORIDE SERPL-SCNC: 102 MMOL/L (ref 98–107)
CO2 SERPL-SCNC: 30 MMOL/L (ref 22–29)
CREAT SERPL-MCNC: 1.22 MG/DL (ref 0.57–1)
DEPRECATED RDW RBC AUTO: 62.7 FL (ref 37–54)
EGFRCR SERPLBLD CKD-EPI 2021: 44.4 ML/MIN/1.73
ERYTHROCYTE [DISTWIDTH] IN BLOOD BY AUTOMATED COUNT: 19.9 % (ref 12.3–15.4)
GLOBULIN UR ELPH-MCNC: 3.2 GM/DL
GLUCOSE SERPL-MCNC: 121 MG/DL (ref 65–99)
HCT VFR BLD AUTO: 36.3 % (ref 34–46.6)
HGB BLD-MCNC: 12 G/DL (ref 12–15.9)
MCH RBC QN AUTO: 29.3 PG (ref 26.6–33)
MCHC RBC AUTO-ENTMCNC: 33.1 G/DL (ref 31.5–35.7)
MCV RBC AUTO: 88.8 FL (ref 79–97)
PLATELET # BLD AUTO: 469 10*3/MM3 (ref 140–450)
PMV BLD AUTO: 10.4 FL (ref 6–12)
POTASSIUM SERPL-SCNC: 4.3 MMOL/L (ref 3.5–5.2)
PROT SERPL-MCNC: 6 G/DL (ref 6–8.5)
RBC # BLD AUTO: 4.09 10*6/MM3 (ref 3.77–5.28)
SODIUM SERPL-SCNC: 138 MMOL/L (ref 136–145)
WBC NRBC COR # BLD: 7.17 10*3/MM3 (ref 3.4–10.8)

## 2022-07-16 PROCEDURE — 99231 SBSQ HOSP IP/OBS SF/LOW 25: CPT | Performed by: FAMILY MEDICINE

## 2022-07-16 PROCEDURE — 94799 UNLISTED PULMONARY SVC/PX: CPT

## 2022-07-16 PROCEDURE — 85027 COMPLETE CBC AUTOMATED: CPT | Performed by: INTERNAL MEDICINE

## 2022-07-16 PROCEDURE — 99231 SBSQ HOSP IP/OBS SF/LOW 25: CPT | Performed by: INTERNAL MEDICINE

## 2022-07-16 PROCEDURE — 80053 COMPREHEN METABOLIC PANEL: CPT | Performed by: INTERNAL MEDICINE

## 2022-07-16 RX ORDER — TRAMADOL HYDROCHLORIDE 50 MG/1
50 TABLET ORAL EVERY 12 HOURS PRN
Status: DISCONTINUED | OUTPATIENT
Start: 2022-07-16 | End: 2022-07-17 | Stop reason: HOSPADM

## 2022-07-16 RX ADMIN — CARVEDILOL 6.25 MG: 6.25 TABLET, FILM COATED ORAL at 08:58

## 2022-07-16 RX ADMIN — DOCUSATE SODIUM 100 MG: 100 CAPSULE, LIQUID FILLED ORAL at 08:58

## 2022-07-16 RX ADMIN — GABAPENTIN 200 MG: 100 CAPSULE ORAL at 08:58

## 2022-07-16 RX ADMIN — TRAMADOL HYDROCHLORIDE 50 MG: 50 TABLET, COATED ORAL at 11:36

## 2022-07-16 RX ADMIN — PROMETHAZINE HYDROCHLORIDE AND CODEINE PHOSPHATE 5 ML: 6.25; 1 SOLUTION ORAL at 20:34

## 2022-07-16 RX ADMIN — TORSEMIDE 20 MG: 20 TABLET ORAL at 08:58

## 2022-07-16 RX ADMIN — Medication 10 ML: at 08:59

## 2022-07-16 RX ADMIN — GUAIFENESIN AND DEXTROMETHORPHAN HYDROBROMIDE 1 TABLET: 600; 30 TABLET, EXTENDED RELEASE ORAL at 13:30

## 2022-07-16 RX ADMIN — GABAPENTIN 200 MG: 100 CAPSULE ORAL at 20:34

## 2022-07-16 RX ADMIN — DOCUSATE SODIUM 100 MG: 100 CAPSULE, LIQUID FILLED ORAL at 20:34

## 2022-07-16 RX ADMIN — BUDESONIDE 0.5 MG: 0.5 SUSPENSION RESPIRATORY (INHALATION) at 20:16

## 2022-07-16 RX ADMIN — SPIRONOLACTONE 12.5 MG: 25 TABLET ORAL at 08:58

## 2022-07-16 RX ADMIN — Medication 10 ML: at 20:35

## 2022-07-16 NOTE — CASE MANAGEMENT/SOCIAL WORK
Continued Stay Note  [facility]     Patient Name: Desiree Forte  MRN: [id number]  Today's Date: 7/16/2022    Admit Date: 7/13/2022     Discharge Plan     Row Name 07/16/22 1224       Plan    Plan Home    Patient/Family in Agreement with Plan yes    Plan Comments Received call from Palliative care that patient may be going home tomorrow and would like a rollator for mobilty assistance.  Order placed in Epic and called to [facility].  They wll deliver to room.    Final Discharge Disposition Code 01 - home or self-care               Discharge Codes    No documentation.                     Mary Alice Chavira RN

## 2022-07-16 NOTE — PLAN OF CARE
Goal Outcome Evaluation:  Plan of Care Reviewed With: patient, daughter        Progress: no change  Outcome Evaluation: Pt states her throat remains sore. Pt tried ultram for pain but became dizzy shortly after taking. Discussed with pt about potential dizziness with gabapentin as well. Pt states she has not been dizzy since the increase dose. Pt has a cough and some dyspnea. Pt is on room air.  Dtr at the bedside. Pt hopeful to go home tomorrow and states she will need a rollator at d/c. notified Mary Alice in CM.  Pt plans to continue palliative treatment for cancer.

## 2022-07-16 NOTE — PROGRESS NOTES
[facility] Medicine Services  PROGRESS NOTE    Patient Name: Desiree Forte  : 1940  MRN: [id number]    Date of Admission: 2022  Primary Care Physician: Kristofer Elder MD    Subjective   Subjective     CC:  hemoptysis    HPI:  Patient reports that she is still having hemoptysis. She is also having severe pain in her throat. She is concerned about going home in pain and also going home with trying a new pain medication.  Daughter is bedside.     ROS:  Gen- No fevers, chills  CV- No chest pain, palpitations  Resp- No cough, dyspnea  GI- No N/V/D, abd pain            Objective   Objective     Vital Signs:   Temp:  [97.5 °F (36.4 °C)-98.7 °F (37.1 °C)] 97.7 °F (36.5 °C)  Heart Rate:  [69-82] 71  Resp:  [16-18] 18  BP: ()/(54-75) 104/61     Physical Exam:  Constitutional: No acute distress, awake, alert  HENT: NCAT, mucous membranes moist  Respiratory: , respiratory effort normal   Cardiovascular: RRR,   Gastrointestinal:nondistended  Musculoskeletal: No bilateral ankle edema  Psychiatric: Appropriate affect, cooperative  Neurologic: Oriented x 3,  speech clear  Skin: No rashes        Results Reviewed:  LAB RESULTS:      Lab 22  0819 07/15/22  0659 22  1419 22  0439 22  2047 22  0949   WBC 7.17 7.40  --  8.03  --  9.65   HEMOGLOBIN 12.0 11.2* 12.2 12.1 12.3 13.0   HEMATOCRIT 36.3 33.4* 36.8 36.3 36.7 38.3   PLATELETS 469* 424  --  464*  --  461*   NEUTROS ABS  --  5.72  --  6.40  --  8.04*   IMMATURE GRANS (ABS)  --  0.03  --  0.04  --  0.03   LYMPHS ABS  --  0.65*  --  0.63*  --  0.50*   MONOS ABS  --  0.66  --  0.66  --  0.74   EOS ABS  --  0.31  --  0.27  --  0.28   MCV 88.8 86.8  --  87.1  --  85.9   PROCALCITONIN  --   --   --   --   --  0.17   LACTATE  --   --   --   --   --  1.2   PROTIME  --   --   --   --   --  15.0*   APTT  --   --   --   --   --  38.7*   HEPARIN ANTI-XA  --   --   --   --   --  0.10*         Lab 22  0819  07/15/22  0659 07/14/22  0439 07/13/22  0949   SODIUM 138 143 142 143   POTASSIUM 4.3 4.0 4.1 3.8   CHLORIDE 102 106 102 101   CO2 30.0* 29.0 33.0* 33.0*   ANION GAP 6.0 8.0 7.0 9.0   BUN 31* 28* 32* 34*   CREATININE 1.22* 0.99 1.17* 1.33*   EGFR 44.4* 57.0* 46.7* 40.0*   GLUCOSE 121* 77 75 92   CALCIUM 8.8 8.6 9.4 10.1   HEMOGLOBIN A1C  --   --  5.60  --    TSH  --   --  11.760*  --          Lab 07/16/22  0819 07/15/22  0659 07/13/22  0949   TOTAL PROTEIN 6.0 4.8* 6.5   ALBUMIN 2.80* 2.60* 3.30*   GLOBULIN 3.2 2.2 3.2   ALT (SGPT) 10 8 10   AST (SGOT) 27 21 27   BILIRUBIN 1.2 1.1 1.5*   ALK PHOS 82 77 89         Lab 07/13/22  0949   PROBNP >70,000.0*   TROPONIN T 0.022   PROTIME 15.0*   INR 1.19*         Lab 07/14/22  0439   CHOLESTEROL 113   LDL CHOL 75   HDL CHOL 15*   TRIGLYCERIDES 123         Lab 07/14/22  0439   ABO TYPING B   RH TYPING Negative   ANTIBODY SCREEN Negative         Brief Urine Lab Results  (Last result in the past 365 days)      Color   Clarity   Blood   Leuk Est   Nitrite   Protein   CREAT   Urine HCG        06/23/22 1012 Yellow   Slightly Cloudy   Negative   Moderate (2+)   Negative   Trace                 Microbiology Results Abnormal     Procedure Component Value - Date/Time    Blood Culture - Blood, Arm, Right [[id number]]  (Normal) Collected: 07/14/22 1419    Lab Status: Preliminary result Specimen: Blood from Arm, Right Updated: 07/16/22 1503     Blood Culture No growth at 2 days    Blood Culture - Blood, Hand, Right [[id number]]  (Normal) Collected: 07/14/22 1419    Lab Status: Preliminary result Specimen: Blood from Hand, Right Updated: 07/16/22 1503     Blood Culture No growth at 2 days    Blood Culture - Blood, Arm, Left [[id number]]  (Normal) Collected: 07/13/22 1242    Lab Status: Preliminary result Specimen: Blood from Arm, Left Updated: 07/16/22 1316     Blood Culture No growth at 3 days    COVID PRE-OP / PRE-PROCEDURE SCREENING ORDER (NO ISOLATION) - Swab, Nasopharynx [[id number]]   (Normal) Collected: 07/13/22 1643    Lab Status: Final result Specimen: Swab from Nasopharynx Updated: 07/13/22 1723    Narrative:      The following orders were created for panel order COVID PRE-OP / PRE-PROCEDURE SCREENING ORDER (NO ISOLATION) - Swab, Nasopharynx.  Procedure                               Abnormality         Status                     ---------                               -----------         ------                     COVID-19 and FLU A/B PCR...[[id number]]  Normal              Final result                 Please view results for these tests on the individual orders.    COVID-19 and FLU A/B PCR - Swab, Nasopharynx [[id number]]  (Normal) Collected: 07/13/22 1643    Lab Status: Final result Specimen: Swab from Nasopharynx Updated: 07/13/22 1723     COVID19 Not Detected     Influenza A PCR Not Detected     Influenza B PCR Not Detected    Narrative:      Fact sheet for providers: https://www.fda.gov/media/571286/download    Fact sheet for patients: https://www.fda.gov/media/033227/download    Test performed by PCR.          No radiology results from the last 24 hrs    Results for orders placed during the hospital encounter of 07/13/22    Adult Transthoracic Echo Complete W/ Cont if Necessary Per Protocol    Interpretation Summary  · The left ventricular cavity is borderline dilated.  · Left ventricular wall thickness is consistent with mild concentric hypertrophy.  · Moderate pulmonic valve regurgitation is present.  · Left atrial volume is moderately increased.  · Severe mitral valve regurgitation is present. Pulmonary vein flow reversal is present.  · Estimated right ventricular systolic pressure from tricuspid regurgitation is mildly elevated (35-45 mmHg).  · The following left ventricular wall segments are hypokinetic: mid anterior, apical anterior, basal anterolateral, mid anterolateral, apical lateral, basal inferolateral, mid inferolateral, apical inferior, mid inferior, apical septal, basal  inferoseptal, mid inferoseptal, apex hypokinetic, mid anteroseptal, basal anterior, basal inferior and basal inferoseptal.  · Estimated left ventricular EF = 40% Estimated left ventricular EF was in agreement with the calculated left ventricular EF. Left ventricular ejection fraction appears to be 36 - 40%. Left ventricular systolic function is mildly decreased.  · Left ventricular diastolic function is consistent with (grade II w/high LAP) pseudonormalization.  · There is bileaflet mitral valve thickening present.  · Mild to moderate pulmonary hypertension is present.  · There is a trivial circumferential pericardial effusion.  · Overall, no significant change from 2 June 2022 echocardiographic study report.      I have reviewed the medications:  Scheduled Meds:budesonide, 0.5 mg, Nebulization, BID - RT  carvedilol, 6.25 mg, Oral, BID  docusate sodium, 100 mg, Oral, BID  gabapentin, 200 mg, Oral, Q12H  lidocaine, 1 patch, Transdermal, Q24H  sodium chloride, 10 mL, Intravenous, Q12H  spironolactone, 12.5 mg, Oral, Daily  torsemide, 20 mg, Oral, Daily      Continuous Infusions:   PRN Meds:.•  acetaminophen **OR** acetaminophen **OR** acetaminophen  •  guaifenesin-dextromethorphan  •  ondansetron  •  promethazine-codeine  •  simethicone  •  sodium chloride  •  sodium chloride  •  traMADol    Assessment & Plan   Assessment & Plan     Active Hospital Problems    Diagnosis  POA   • Hemoptysis [R04.2]  Yes   • Stage 3b chronic kidney disease (HCC) [N18.32]  Unknown   • 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 difficile diarrhea [A04.72]  Yes   • PVC's (premature ventricular contractions) [I49.3]  Yes   • Essential hypertension [I10]  Yes      Resolved Hospital Problems   No resolved problems to display.        Brief Hospital Course to date:  Ms. Forte is an 81 yo F with PMH of CHF, HLD, PVCs and Metastatic Hepatocellular Carcinoma followed by Dr. Pollock who presented to the  ED with hemoptysis.  Pt was found to have small pulmonary emboli.  We were asked to admit for further management.     This patient's problems and plans were partially entered by my partner and updated as appropriate by me 07/16/22.         Hemoptysis  Bilateral Pulmonary Emboli  Acute and chronic DVT   Right basilar consolidation  -- Pulmonary has seen, do not recommend anticoagulation at this point due to hemoptysis  -- monitor H&H  -- suspect right basilar consolidation is infarction vs metastatic mass vs (less likely) infection.  -- defer ABX for time being  -- supportive care, wean supplemental O2 as tolerated  -- consult Palliative for possible Hospice referral  -- check TTE   -- duplex noted with DVT- no filter at this time  --plan for outpatient AC at some point      HFrEF  -- most recent TTE from June 2022 showed an EF of 32% with diastolic dysfunction noted as well  -- proBNP significantly elevated on admission at 70K but suspect this may due to right heart strain in setting of above  -- continue home diuretics for time being, monitor closely for HD instability given above     Metastatic HCC  --followed by Dr. Pollock, plan for outpatient treatment      Frequent PVCs  -- continue home meds  -- replace electrolytes as indicated    +blood cx  --noted from admission with 1 set +GPC. Favor contaminant but will send repeat      CKD   -- stage IIIb  -- Cr at baseline, monitor    Elevated TSH  --check t4, not on replacement per med rec     DVT prophylaxis:  mechanical  Expected Discharge Location and Transportation: home with   Expected Discharge Date: 7/17    DVT prophylaxis:  Mechanical DVT prophylaxis orders are present.     AM-PAC 6 Clicks Score (PT): 18 (07/15/22 1351)    CODE STATUS:   Code Status and Medical Interventions:   Ordered at: 07/13/22 1345     Medical Intervention Limits:    NO intubation (DNI)    NO cardioversion    NO dialysis    NO antibiotics    NO artificial nutrition    NO  vasopressors     Level Of Support Discussed With:    Patient     Code Status (Patient has no pulse and is not breathing):    No CPR (Do Not Attempt to Resuscitate)     Medical Interventions (Patient has pulse or is breathing):    Limited Support       Sussy Montero,   07/16/22

## 2022-07-16 NOTE — PROGRESS NOTES
"HEMATOLOGY/ONCOLOGY PROGRESS NOTE    S: Doing well.  No major issues over the last day.        Past medical history, social history and family history was reviewed and unchanged from prior visit.    Review of Systems:    Review of Systems         Medications:  The current medication list was reviewed in the EMR    ALLERGIES:    Allergies   Allergen Reactions   • Metformin Diarrhea   • Statins Myalgia     Muscle pain         Physical Exam    VITAL SIGNS:  /54 (BP Location: Right arm, Patient Position: Lying)   Pulse 82   Temp 98 °F (36.7 °C) (Axillary)   Resp 18   Ht 162.6 cm (64\")   Wt 45.4 kg (100 lb)   SpO2 95%   BMI 17.16 kg/m²   Temp:  [95.5 °F (35.3 °C)-98.7 °F (37.1 °C)] 98 °F (36.7 °C)      Performance Status:2                Physical Exam    General: Chronically ill appearing, in no acute distress  HEENT: sclerae anicteric, neck is supple  Abdomen: Distended  Extremities: Positive edema  Skin: no rashes, lesions, bruising, or petechiae  Msk:  Shows no weakness of the large muscle groups  Psych: Mood is stable        RECENT LABS:    Lab Results   Component Value Date    HGB 12.0 07/16/2022    HCT 36.3 07/16/2022    MCV 88.8 07/16/2022     (H) 07/16/2022    WBC 7.17 07/16/2022    NEUTROABS 5.72 07/15/2022    LYMPHSABS 0.65 (L) 07/15/2022    MONOSABS 0.66 07/15/2022    EOSABS 0.31 07/15/2022    BASOSABS 0.03 07/15/2022       Lab Results   Component Value Date    GLUCOSE 77 07/15/2022    BUN 28 (H) 07/15/2022    CREATININE 0.99 07/15/2022     07/15/2022    K 4.0 07/15/2022     07/15/2022    CO2 29.0 07/15/2022    CALCIUM 8.6 07/15/2022    PROTEINTOT 4.8 (L) 07/15/2022    ALBUMIN 2.60 (L) 07/15/2022    BILITOT 1.1 07/15/2022    ALKPHOS 77 07/15/2022    AST 21 07/15/2022    ALT 8 07/15/2022         Assessment/Plan    1.  Pulmonary embolism with hemoptysis.  Unable to anticoagulate due to hemoptysis.  For now no intervention.  Patient understands risk benefit of no intervention.  " After discussion regarding the pros and cons of IVC filter we have decided against it.  I would likely consider placing her on low-dose Eliquis as an outpatient in a week to 10 days once her hemoptysis is cleared.    2.  Metastatic hepatocellular carcinoma.  We will switch her treatment to lenvatinib.  This will be done as an outpatient and our pharmacy is already starting to work on it.      She can be discharged home today from my standpoint to follow-up with me as an outpatient.            Primitivo Pollock MD  [facility] Hematology and Oncology    7/16/2022

## 2022-07-17 ENCOUNTER — READMISSION MANAGEMENT (OUTPATIENT)
Dept: CALL CENTER | Facility: HOSPITAL | Age: 82
End: 2022-07-17

## 2022-07-17 VITALS
HEIGHT: 64 IN | RESPIRATION RATE: 20 BRPM | TEMPERATURE: 97.7 F | BODY MASS INDEX: 17.07 KG/M2 | OXYGEN SATURATION: 91 % | SYSTOLIC BLOOD PRESSURE: 122 MMHG | WEIGHT: 100 LBS | HEART RATE: 72 BPM | DIASTOLIC BLOOD PRESSURE: 66 MMHG

## 2022-07-17 LAB
BASOPHILS # BLD AUTO: 0.05 10*3/MM3 (ref 0–0.2)
BASOPHILS NFR BLD AUTO: 0.8 % (ref 0–1.5)
DEPRECATED RDW RBC AUTO: 60.7 FL (ref 37–54)
EOSINOPHIL # BLD AUTO: 0.25 10*3/MM3 (ref 0–0.4)
EOSINOPHIL NFR BLD AUTO: 3.9 % (ref 0.3–6.2)
ERYTHROCYTE [DISTWIDTH] IN BLOOD BY AUTOMATED COUNT: 19.2 % (ref 12.3–15.4)
HCT VFR BLD AUTO: 35.2 % (ref 34–46.6)
HGB BLD-MCNC: 11.6 G/DL (ref 12–15.9)
IMM GRANULOCYTES # BLD AUTO: 0.02 10*3/MM3 (ref 0–0.05)
IMM GRANULOCYTES NFR BLD AUTO: 0.3 % (ref 0–0.5)
LYMPHOCYTES # BLD AUTO: 0.65 10*3/MM3 (ref 0.7–3.1)
LYMPHOCYTES NFR BLD AUTO: 10.2 % (ref 19.6–45.3)
MCH RBC QN AUTO: 29.3 PG (ref 26.6–33)
MCHC RBC AUTO-ENTMCNC: 33 G/DL (ref 31.5–35.7)
MCV RBC AUTO: 88.9 FL (ref 79–97)
MONOCYTES # BLD AUTO: 0.72 10*3/MM3 (ref 0.1–0.9)
MONOCYTES NFR BLD AUTO: 11.3 % (ref 5–12)
NEUTROPHILS NFR BLD AUTO: 4.7 10*3/MM3 (ref 1.7–7)
NEUTROPHILS NFR BLD AUTO: 73.5 % (ref 42.7–76)
NRBC BLD AUTO-RTO: 0 /100 WBC (ref 0–0.2)
PLATELET # BLD AUTO: 438 10*3/MM3 (ref 140–450)
PMV BLD AUTO: 10.2 FL (ref 6–12)
RBC # BLD AUTO: 3.96 10*6/MM3 (ref 3.77–5.28)
WBC NRBC COR # BLD: 6.39 10*3/MM3 (ref 3.4–10.8)

## 2022-07-17 PROCEDURE — 94799 UNLISTED PULMONARY SVC/PX: CPT

## 2022-07-17 PROCEDURE — 85025 COMPLETE CBC W/AUTO DIFF WBC: CPT | Performed by: PHYSICIAN ASSISTANT

## 2022-07-17 PROCEDURE — 99238 HOSP IP/OBS DSCHRG MGMT 30/<: CPT | Performed by: FAMILY MEDICINE

## 2022-07-17 RX ORDER — GABAPENTIN 100 MG/1
200 CAPSULE ORAL EVERY 12 HOURS SCHEDULED
Qty: 28 CAPSULE | Refills: 0 | Status: SHIPPED | OUTPATIENT
Start: 2022-07-17 | End: 2022-08-11

## 2022-07-17 RX ORDER — TRAMADOL HYDROCHLORIDE 50 MG/1
50 TABLET ORAL 2 TIMES DAILY PRN
Qty: 14 TABLET | Refills: 0 | Status: SHIPPED | OUTPATIENT
Start: 2022-07-17 | End: 2022-07-24

## 2022-07-17 RX ADMIN — BUDESONIDE 0.5 MG: 0.5 SUSPENSION RESPIRATORY (INHALATION) at 07:04

## 2022-07-17 RX ADMIN — Medication 10 ML: at 10:22

## 2022-07-17 RX ADMIN — TRAMADOL HYDROCHLORIDE 50 MG: 50 TABLET, COATED ORAL at 10:22

## 2022-07-17 RX ADMIN — DOCUSATE SODIUM 100 MG: 100 CAPSULE, LIQUID FILLED ORAL at 09:02

## 2022-07-17 RX ADMIN — LIDOCAINE 1 PATCH: 50 PATCH CUTANEOUS at 09:02

## 2022-07-17 RX ADMIN — TORSEMIDE 20 MG: 20 TABLET ORAL at 09:02

## 2022-07-17 RX ADMIN — CARVEDILOL 6.25 MG: 6.25 TABLET, FILM COATED ORAL at 09:03

## 2022-07-17 RX ADMIN — GABAPENTIN 200 MG: 100 CAPSULE ORAL at 09:06

## 2022-07-17 RX ADMIN — SPIRONOLACTONE 12.5 MG: 25 TABLET ORAL at 09:02

## 2022-07-17 NOTE — DISCHARGE SUMMARY
[facility] Medicine Services  DISCHARGE SUMMARY    Patient Name: Desiree Forte  : 1940  MRN: [id number]    Date of Admission: 2022 10:15 AM  Date of Discharge:  22  Primary Care Physician: Kristofer Elder MD    Consults     Date and Time Order Name Status Description    2022  7:46 AM Inpatient Vascular Surgery Consult Completed     2022  6:50 PM Inpatient Palliative Care MD Consult Completed     2022  6:50 PM Inpatient Hematology & Oncology Consult Completed           Hospital Course     Presenting Problem:   Hemoptysis [R04.2]    Active Hospital Problems    Diagnosis  POA   • Hemoptysis [R04.2]  Yes   • Stage 3b chronic kidney disease (HCC) [N18.32]  Unknown   • 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 difficile diarrhea [A04.72]  Yes   • PVC's (premature ventricular contractions) [I49.3]  Yes   • Essential hypertension [I10]  Yes      Resolved Hospital Problems   No resolved problems to display.          Hospital Course:  Desiree Forte is a 82 y.o. female with PMH of CHF, HLD, PVCs and Metastatic Hepatocellular Carcinoma followed by Dr. Pollock who presented to the ED with hemoptysis.  Pt was found to have small pulmonary emboli.    Hemoptysis  Bilateral Pulmonary Emboli  Acute and chronic DVT   Right basilar consolidation  -- Pulmonary has seen, do not recommend anticoagulation at this point due to hemoptysis  -- consult Pallia-- duplex noted with DVT- no filter at this time  --plan for outpatient AC at some point      HFrEF  -- most recent TTE from 2022 showed an EF of 32% with diastolic dysfunction noted as well     Metastatic HCC  --followed by Dr. Pollock, plan for outpatient treatment      Frequent PVCs  -- continue home meds     +blood cx  --noted from admission with 1 set +GPC. contaminant       CKD   -- stage IIIb  -- Cr at baseline         Discharge Follow Up Recommendations  for outpatient labs/diagnostics:   Dr Alfonso  PCP    Day of Discharge     HPI:   Pt has had pain in her neck/throat. Reports that tramadol helped. She also plans to take gabapentin,     Review of Systems  Gen- No fevers, chills  CV- No chest pain, palpitations  Resp- No cough, dyspnea  GI- No N/V/D, abd pain        Vital Signs:   Temp:  [97.6 °F (36.4 °C)-98.4 °F (36.9 °C)] 97.7 °F (36.5 °C)  Heart Rate:  [69-77] 72  Resp:  [18-20] 20  BP: ()/(43-75) 122/66      Physical Exam:  Constitutional: No acute distress, awake, alert  HENT: NCAT, mucous membranes moist  Respiratory: Clear to auscultation bilaterally, respiratory effort normal   Cardiovascular: RRR, no murmurs, rubs, or gallops  Gastrointestinal: Positive bowel sounds, soft, nontender, nondistended  Musculoskeletal: No bilateral ankle edema  Psychiatric: Appropriate affect, cooperative  Neurologic: Oriented x 3, speech clear  Skin: No rashes      Pertinent  and/or Most Recent Results     LAB RESULTS:      Lab 07/17/22  0825 07/16/22  0819 07/15/22  0659 07/14/22  1419 07/14/22  0439 07/13/22  2047 07/13/22  0949   WBC 6.39 7.17 7.40  --  8.03  --  9.65   HEMOGLOBIN 11.6* 12.0 11.2* 12.2 12.1   < > 13.0   HEMATOCRIT 35.2 36.3 33.4* 36.8 36.3   < > 38.3   PLATELETS 438 469* 424  --  464*  --  461*   NEUTROS ABS 4.70  --  5.72  --  6.40  --  8.04*   IMMATURE GRANS (ABS) 0.02  --  0.03  --  0.04  --  0.03   LYMPHS ABS 0.65*  --  0.65*  --  0.63*  --  0.50*   MONOS ABS 0.72  --  0.66  --  0.66  --  0.74   EOS ABS 0.25  --  0.31  --  0.27  --  0.28   MCV 88.9 88.8 86.8  --  87.1  --  85.9   PROCALCITONIN  --   --   --   --   --   --  0.17   LACTATE  --   --   --   --   --   --  1.2   PROTIME  --   --   --   --   --   --  15.0*   APTT  --   --   --   --   --   --  38.7*   HEPARIN ANTI-XA  --   --   --   --   --   --  0.10*    < > = values in this interval not displayed.         Lab 07/16/22  0819 07/15/22  0659 07/14/22  0439 07/13/22  0949   SODIUM 138 143 142  143   POTASSIUM 4.3 4.0 4.1 3.8   CHLORIDE 102 106 102 101   CO2 30.0* 29.0 33.0* 33.0*   ANION GAP 6.0 8.0 7.0 9.0   BUN 31* 28* 32* 34*   CREATININE 1.22* 0.99 1.17* 1.33*   EGFR 44.4* 57.0* 46.7* 40.0*   GLUCOSE 121* 77 75 92   CALCIUM 8.8 8.6 9.4 10.1   HEMOGLOBIN A1C  --   --  5.60  --    TSH  --   --  11.760*  --          Lab 07/16/22  0819 07/15/22  0659 07/13/22  0949   TOTAL PROTEIN 6.0 4.8* 6.5   ALBUMIN 2.80* 2.60* 3.30*   GLOBULIN 3.2 2.2 3.2   ALT (SGPT) 10 8 10   AST (SGOT) 27 21 27   BILIRUBIN 1.2 1.1 1.5*   ALK PHOS 82 77 89         Lab 07/13/22  0949   PROBNP >70,000.0*   TROPONIN T 0.022   PROTIME 15.0*   INR 1.19*         Lab 07/14/22  0439   CHOLESTEROL 113   LDL CHOL 75   HDL CHOL 15*   TRIGLYCERIDES 123         Lab 07/14/22  0439   ABO TYPING B   RH TYPING Negative   ANTIBODY SCREEN Negative         Brief Urine Lab Results  (Last result in the past 365 days)      Color   Clarity   Blood   Leuk Est   Nitrite   Protein   CREAT   Urine HCG        06/23/22 1012 Yellow   Slightly Cloudy   Negative   Moderate (2+)   Negative   Trace               Microbiology Results (last 10 days)     Procedure Component Value - Date/Time    Blood Culture - Blood, Arm, Right [[id number]]  (Normal) Collected: 07/14/22 1419    Lab Status: Preliminary result Specimen: Blood from Arm, Right Updated: 07/16/22 1503     Blood Culture No growth at 2 days    Blood Culture - Blood, Hand, Right [[id number]]  (Normal) Collected: 07/14/22 1419    Lab Status: Preliminary result Specimen: Blood from Hand, Right Updated: 07/16/22 1503     Blood Culture No growth at 2 days    COVID PRE-OP / PRE-PROCEDURE SCREENING ORDER (NO ISOLATION) - Swab, Nasopharynx [[id number]]  (Normal) Collected: 07/13/22 1643    Lab Status: Final result Specimen: Swab from Nasopharynx Updated: 07/13/22 0746    Narrative:      The following orders were created for panel order COVID PRE-OP / PRE-PROCEDURE SCREENING ORDER (NO ISOLATION) - Swab,  Nasopharynx.  Procedure                               Abnormality         Status                     ---------                               -----------         ------                     COVID-19 and FLU A/B PCR...[[id number]]  Normal              Final result                 Please view results for these tests on the individual orders.    COVID-19 and FLU A/B PCR - Swab, Nasopharynx [[id number]]  (Normal) Collected: 07/13/22 1643    Lab Status: Final result Specimen: Swab from Nasopharynx Updated: 07/13/22 1723     COVID19 Not Detected     Influenza A PCR Not Detected     Influenza B PCR Not Detected    Narrative:      Fact sheet for providers: https://www.fda.gov/media/406461/download    Fact sheet for patients: https://www.fda.gov/media/973358/download    Test performed by PCR.    Blood Culture - Blood, Arm, Left [[id number]]  (Normal) Collected: 07/13/22 1242    Lab Status: Preliminary result Specimen: Blood from Arm, Left Updated: 07/16/22 1316     Blood Culture No growth at 3 days    Blood Culture - Blood, Arm, Right [[id number]]  (Abnormal) Collected: 07/13/22 1232    Lab Status: Final result Specimen: Blood from Arm, Right Updated: 07/15/22 0821     Blood Culture Staphylococcus, coagulase negative     Isolated from Aerobic Bottle     Gram Stain Aerobic Bottle Gram positive cocci in groups    Narrative:      Probable contaminant requires clinical correlation, susceptibility not performed unless requested by physician.      Blood Culture ID, PCR - Blood, Arm, Right [[id number]]  (Abnormal) Collected: 07/13/22 1232    Lab Status: Final result Specimen: Blood from Arm, Right Updated: 07/14/22 1254     BCID, PCR Staph spp, not aureus or lugdunesis. Identification by BCID2 PCR.     BOTTLE TYPE Aerobic Bottle          Adult Transthoracic Echo Complete W/ Cont if Necessary Per Protocol    Result Date: 7/14/2022  · The left ventricular cavity is borderline dilated. · Left ventricular wall thickness is consistent with  mild concentric hypertrophy. · Moderate pulmonic valve regurgitation is present. · Left atrial volume is moderately increased. · Severe mitral valve regurgitation is present. Pulmonary vein flow reversal is present. · Estimated right ventricular systolic pressure from tricuspid regurgitation is mildly elevated (35-45 mmHg). · The following left ventricular wall segments are hypokinetic: mid anterior, apical anterior, basal anterolateral, mid anterolateral, apical lateral, basal inferolateral, mid inferolateral, apical inferior, mid inferior, apical septal, basal inferoseptal, mid inferoseptal, apex hypokinetic, mid anteroseptal, basal anterior, basal inferior and basal inferoseptal. · Estimated left ventricular EF = 40% Estimated left ventricular EF was in agreement with the calculated left ventricular EF. Left ventricular ejection fraction appears to be 36 - 40%. Left ventricular systolic function is mildly decreased. · Left ventricular diastolic function is consistent with (grade II w/high LAP) pseudonormalization. · There is bileaflet mitral valve thickening present. · Mild to moderate pulmonary hypertension is present. · There is a trivial circumferential pericardial effusion. · Overall, no significant change from 2 June 2022 echocardiographic study report.      CT Chest With Contrast Diagnostic    Result Date: 7/11/2022  DATE OF EXAM: 7/11/2022 1:58 PM  PROCEDURE: CT CHEST W CONTRAST DIAGNOSTIC-, CT ABDOMEN PELVIS W CONTRAST-  INDICATIONS: R05.3; R05.3-Chronic cough  COMPARISON: 11/22/2021 chest, abdomen and pelvis CT scans.  TECHNIQUE: Routine transaxial slices were obtained through the chest after the intravenous administration of 100 mL of Isovue 300. Reconstructed coronal and sagittal images were also obtained. Automated exposure control and iterative construction methods were used.  The radiation dose reduction device was turned on for each scan per the ALARA (As Low as Reasonably Achievable) protocol.   FINDINGS: Patient history indicates hepatocellular carcinoma metastatic to peritoneum. Previous exam report from 11/22/2021 indicates stable appearance of the chest, abdomen pelvis with cirrhotic hepatic morphology, extensive metastatic peritoneal disease and trace ascites.  CT SCAN OF THE CHEST WITH IV CONTRAST: Compared to the 11/22/2021 exam, there are a few shotty mediastinal lymph nodes which appear stable. No pericardial effusion is seen but there is a new, moderate free-flowing right pleural effusion and new but minimal left pleural effusion. Lungs show new multifocal disease, including an irregularly masslike area in the right lateral costophrenic angle, extending over a roughly 7 x 3 cm area, some adjacent reticular or reticulonodular disease in the right middle lobe, similar pleural-based masslike 2.5 cm opacity in the medial left upper lobe, and scattered areas of nodular and reticular disease, worrisome for irregular pulmonary metastases. Some superimposed component of atypical infection could be present. There actually appears to be a small embolus in the right lower lobe segmental vessel associated with the area of dense right lower lobe disease, and this particular lesion may represent a pulmonary infarct. Please refer to axial image 48 through image 53 series 2. There are small bilateral segmental and subsegmental emboli of both lower lobes elsewhere. No large or saddle embolus is seen. No potential embolic disease is identified elsewhere.      1. Interval development of a small-to-moderate right pleural effusion and minimal left pleural effusion. 2. Multiple small bilateral lower lobe pulmonary emboli, the largest of which appears to be associated with a 7 x 3 cm lateral right lower lobe infarct. 3. Ill-defined multifocal nodular and reticular disease of lungs elsewhere are concerning for infiltrating metastases, less likely atypical pneumonia.    Note: Preliminary findings were called to   Phill at approximately 5:20 PM 07/11/2022.    CT SCAN OF THE ABDOMEN PELVIS WITH IV CONTRAST: As on the prior study, there is diffuse fatty liver change. Today's exam shows a very small focus of subcapsular liver enhancement in the inferior right liver lobe, nonspecific, and essentially unchanged from the prior study, possibly incidental to the patient's known disease. No other hepatic lesions are identified. Spleen is not enlarged. No significant abnormalities are appreciated of the pancreas, adrenal glands, or kidneys except for renal cortical thinning, not unusual for the patient's age. Gallbladder is contracted but otherwise unremarkable.  The patient's advanced nodular peritoneal disease is overall relatively similar in distribution to the prior study, but with evidence of moderate gradual progression, including increasingly dense masslike anterior omental disease. No significant retroperitoneal lymphadenopathy is seen. There is little evidence of adenopathy in the small bowel mesentery. Bowel loops are normal in caliber. 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 Bladder is decompressed. Bony structures appear to be intact. Delayed venous phase images show no evidence of obstructive uropathy.  IMPRESSION:  1. Advanced, extensive peritoneal metastatic disease, with moderate general progression since 11/22/2021 exam. No evidence of intrinsic solid organ disease and no obvious retroperitoneal kortney disease. 2. Mild-to-moderate ascites, increased from prior study.  This report was finalized on 7/11/2022 10:02 PM by Dr. Caleb Trevino MD.      CT Abdomen Pelvis With Contrast    Result Date: 7/11/2022  DATE OF EXAM: 7/11/2022 1:58 PM  PROCEDURE: CT CHEST W CONTRAST DIAGNOSTIC-, CT ABDOMEN PELVIS W CONTRAST-  INDICATIONS: R05.3; R05.3-Chronic cough  COMPARISON: 11/22/2021 chest, abdomen and pelvis CT scans.  TECHNIQUE: Routine transaxial slices were obtained through the chest after the intravenous  administration of 100 mL of Isovue 300. Reconstructed coronal and sagittal images were also obtained. Automated exposure control and iterative construction methods were used.  The radiation dose reduction device was turned on for each scan per the ALARA (As Low as Reasonably Achievable) protocol.  FINDINGS: Patient history indicates hepatocellular carcinoma metastatic to peritoneum. Previous exam report from 11/22/2021 indicates stable appearance of the chest, abdomen pelvis with cirrhotic hepatic morphology, extensive metastatic peritoneal disease and trace ascites.  CT SCAN OF THE CHEST WITH IV CONTRAST: Compared to the 11/22/2021 exam, there are a few shotty mediastinal lymph nodes which appear stable. No pericardial effusion is seen but there is a new, moderate free-flowing right pleural effusion and new but minimal left pleural effusion. Lungs show new multifocal disease, including an irregularly masslike area in the right lateral costophrenic angle, extending over a roughly 7 x 3 cm area, some adjacent reticular or reticulonodular disease in the right middle lobe, similar pleural-based masslike 2.5 cm opacity in the medial left upper lobe, and scattered areas of nodular and reticular disease, worrisome for irregular pulmonary metastases. Some superimposed component of atypical infection could be present. There actually appears to be a small embolus in the right lower lobe segmental vessel associated with the area of dense right lower lobe disease, and this particular lesion may represent a pulmonary infarct. Please refer to axial image 48 through image 53 series 2. There are small bilateral segmental and subsegmental emboli of both lower lobes elsewhere. No large or saddle embolus is seen. No potential embolic disease is identified elsewhere.      1. Interval development of a small-to-moderate right pleural effusion and minimal left pleural effusion. 2. Multiple small bilateral lower lobe pulmonary emboli, the  largest of which appears to be associated with a 7 x 3 cm lateral right lower lobe infarct. 3. Ill-defined multifocal nodular and reticular disease of lungs elsewhere are concerning for infiltrating metastases, less likely atypical pneumonia.    Note: Preliminary findings were called to Dr. Pollock at approximately 5:20 PM 07/11/2022.    CT SCAN OF THE ABDOMEN PELVIS WITH IV CONTRAST: As on the prior study, there is diffuse fatty liver change. Today's exam shows a very small focus of subcapsular liver enhancement in the inferior right liver lobe, nonspecific, and essentially unchanged from the prior study, possibly incidental to the patient's known disease. No other hepatic lesions are identified. Spleen is not enlarged. No significant abnormalities are appreciated of the pancreas, adrenal glands, or kidneys except for renal cortical thinning, not unusual for the patient's age. Gallbladder is contracted but otherwise unremarkable.  The patient's advanced nodular peritoneal disease is overall relatively similar in distribution to the prior study, but with evidence of moderate gradual progression, including increasingly dense masslike anterior omental disease. No significant retroperitoneal lymphadenopathy is seen. There is little evidence of adenopathy in the small bowel mesentery. Bowel loops are normal in caliber. 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 Bladder is decompressed. Bony structures appear to be intact. Delayed venous phase images show no evidence of obstructive uropathy.  IMPRESSION:  1. Advanced, extensive peritoneal metastatic disease, with moderate general progression since 11/22/2021 exam. No evidence of intrinsic solid organ disease and no obvious retroperitoneal kortney disease. 2. Mild-to-moderate ascites, increased from prior study.  This report was finalized on 7/11/2022 10:02 PM by Dr. Caleb Trevino MD.      FL Video Swallow With Speech Single Contrast    Result Date:  7/14/2022  EXAMINATION: FL VIDEO SWALLOW W SPEECH SINGLE-CONTRAST-  INDICATION: dysphagia; R04.2-Hemoptysis; I26.99-Other pulmonary embolism without acute cor pulmonale; C22.0-Liver cell carcinoma; J18.9-Pneumonia, unspecified organism; C22.0-Liver cell carcinoma; C78.6-Secondary malignant neoplasm of retroperitoneum and peritoneum  TECHNIQUE: 18 seconds of fluoroscopic time was used for this exam. 6 associated fluoroscopic loops were saved. The patient was evaluated in the seated lateral position while taking a variety of consistencies of barium by mouth under the direction of speech pathology.  COMPARISON: NONE  FINDINGS: 18 seconds of fluoroscopy provided for a modified barium swallow. Please see speech therapy report for full details and recommendations.       Fluoroscopy provided for a modified barium swallow. Please see speech therapy report for full details and recommendations.    This report was finalized on 7/14/2022 3:50 PM by Manuel Lee.      XR Chest 1 View    Result Date: 7/13/2022  XR CHEST 1 VW-  Date of Exam: 7/13/2022 10:27 AM  Indication: SOA triage protocol.  Comparison Exams: CT chest from July 11, 2022  Technique: Single AP chest radiograph  FINDINGS: There is a right basilar consolidation and a small right pleural effusion. The heart is enlarged. There is pulmonary vascular congestion. The osseous structures appear intact.      1.  Right basilar consolidation, which could represent atelectasis or pneumonia. 2.  Pulmonary vascular congestion with small right pleural effusion. 3.  Cardiomegaly.  This report was finalized on 7/13/2022 10:45 AM by Alfozno Bunn MD.      Duplex Venous Lower Extremity - Bilateral CAR    Result Date: 7/13/2022  · Sub-acute left lower extremity deep vein thrombosis noted in the common femoral and proximal femoral. · Acute right lower extremity deep vein thrombosis noted in the peroneal. · Acute left lower extremity deep vein thrombosis noted in the peroneal  and soleal. · All other veins appeared normal bilaterally.        Results for orders placed during the hospital encounter of 07/13/22    Duplex Venous Lower Extremity - Bilateral CAR    Interpretation Summary  · Sub-acute left lower extremity deep vein thrombosis noted in the common femoral and proximal femoral.  · Acute right lower extremity deep vein thrombosis noted in the peroneal.  · Acute left lower extremity deep vein thrombosis noted in the peroneal and soleal.  · All other veins appeared normal bilaterally.      Results for orders placed during the hospital encounter of 07/13/22    Duplex Venous Lower Extremity - Bilateral CAR    Interpretation Summary  · Sub-acute left lower extremity deep vein thrombosis noted in the common femoral and proximal femoral.  · Acute right lower extremity deep vein thrombosis noted in the peroneal.  · Acute left lower extremity deep vein thrombosis noted in the peroneal and soleal.  · All other veins appeared normal bilaterally.      Results for orders placed during the hospital encounter of 07/13/22    Adult Transthoracic Echo Complete W/ Cont if Necessary Per Protocol    Interpretation Summary  · The left ventricular cavity is borderline dilated.  · Left ventricular wall thickness is consistent with mild concentric hypertrophy.  · Moderate pulmonic valve regurgitation is present.  · Left atrial volume is moderately increased.  · Severe mitral valve regurgitation is present. Pulmonary vein flow reversal is present.  · Estimated right ventricular systolic pressure from tricuspid regurgitation is mildly elevated (35-45 mmHg).  · The following left ventricular wall segments are hypokinetic: mid anterior, apical anterior, basal anterolateral, mid anterolateral, apical lateral, basal inferolateral, mid inferolateral, apical inferior, mid inferior, apical septal, basal inferoseptal, mid inferoseptal, apex hypokinetic, mid anteroseptal, basal anterior, basal inferior and basal  inferoseptal.  · Estimated left ventricular EF = 40% Estimated left ventricular EF was in agreement with the calculated left ventricular EF. Left ventricular ejection fraction appears to be 36 - 40%. Left ventricular systolic function is mildly decreased.  · Left ventricular diastolic function is consistent with (grade II w/high LAP) pseudonormalization.  · There is bileaflet mitral valve thickening present.  · Mild to moderate pulmonary hypertension is present.  · There is a trivial circumferential pericardial effusion.  · Overall, no significant change from 2 June 2022 echocardiographic study report.      Plan for Follow-up of Pending Labs/Results:   Pending Labs     Order Current Status    Blood Culture - Blood, Arm, Left Preliminary result    Blood Culture - Blood, Arm, Right Preliminary result    Blood Culture - Blood, Hand, Right Preliminary result        Discharge Details        Discharge Medications      ASK your doctor about these medications      Instructions Start Date   apixaban 5 MG tablet tablet  Commonly known as: ELIQUIS   5 mg, Oral, 2 Times Daily      carvedilol 6.25 MG tablet  Commonly known as: COREG   6.25 mg, Oral, 2 Times Daily      fenofibrate 145 MG tablet  Commonly known as: TRICOR   145 mg, Oral, Daily      Flovent  MCG/ACT inhaler  Generic drug: fluticasone   1-2 puffs, Inhalation, 2 Times Daily - RT      promethazine-dextromethorphan 6.25-15 MG/5ML syrup  Commonly known as: PROMETHAZINE-DM   5 mL, Oral, 4 Times Daily PRN      spironolactone 25 MG tablet  Commonly known as: ALDACTONE   12.5 mg, Oral, Daily      torsemide 20 MG tablet  Commonly known as: DEMADEX   20 mg, Oral, Daily             Allergies   Allergen Reactions   • Metformin Diarrhea   • Statins Myalgia     Muscle pain         Discharge Disposition:      Diet:  Hospital:  Diet Order   Procedures   • Diet Regular       Activity:      Restrictions or Other Recommendations:         CODE STATUS:    Code Status and Medical  Interventions:   Ordered at: 07/13/22 1345     Medical Intervention Limits:    NO intubation (DNI)    NO cardioversion    NO dialysis    NO antibiotics    NO artificial nutrition    NO vasopressors     Level Of Support Discussed With:    Patient     Code Status (Patient has no pulse and is not breathing):    No CPR (Do Not Attempt to Resuscitate)     Medical Interventions (Patient has pulse or is breathing):    Limited Support       Future Appointments   Date Time Provider Department Center   11/9/2022 11:15 AM Carlyle Saucedo MD [facility]       Additional Instructions for the Follow-ups that You Need to Schedule     TSH   Jul 18, 2022      Add ICD-10 Code Z51.81 - Therapeutic drug monitoring.    Order Comments: Add ICD-10 Code Z51.81 - Therapeutic drug monitoring.     Release to patient: Immediate         CBC and Differential   Aug 01, 2022      Manual Differential: No    Release to patient: Immediate         Comprehensive metabolic panel   Aug 01, 2022      Release to patient: Immediate         CBC and Differential   Aug 15, 2022      Manual Differential: No    Release to patient: Immediate         Comprehensive metabolic panel   Aug 15, 2022      Release to patient: Immediate         TSH   Aug 15, 2022      Add ICD-10 Code Z51.81 - Therapeutic drug monitoring.    Order Comments: Add ICD-10 Code Z51.81 - Therapeutic drug monitoring.     Release to patient: Immediate         CBC and Differential   Aug 29, 2022      Manual Differential: No    Release to patient: Immediate         Comprehensive metabolic panel   Aug 29, 2022      Release to patient: Immediate                     Sussy Montero DO  07/17/22      Time Spent on Discharge:  I spent  20  minutes on this discharge activity which included: face-to-face encounter with the patient, reviewing the data in the system, coordination of the care with the nursing staff as well as consultants, documentation, and entering orders.

## 2022-07-17 NOTE — PLAN OF CARE
Goal Outcome Evaluation:     Pain management plan in place.   Patient comfortable.  Hemoptysis resolved, pt has slight dry cough.  Ok to discharge home.   Duration Of Freeze Thaw-Cycle (Seconds): 0 Consent: The patient's consent was obtained including but not limited to risks of crusting, scabbing, blistering, scarring, darker or lighter pigmentary change, recurrence, incomplete removal and infection. Render Note In Bullet Format When Appropriate: No Post-Care Instructions: I reviewed with the patient in detail post-care instructions. Patient is to wear sunprotection, and avoid picking at any of the treated lesions. Pt may apply Vaseline to crusted or scabbing areas. Detail Level: Detailed Medical Necessity Information: It is in your best interest to select a reason for this procedure from the list below. All of these items fulfill various CMS LCD requirements except the new and changing color options. Medical Necessity Clause: This procedure was medically necessary because the lesions that were treated were:  If lesion does not resolve, bx is needed.

## 2022-07-18 ENCOUNTER — SPECIALTY PHARMACY (OUTPATIENT)
Dept: ONCOLOGY | Facility: HOSPITAL | Age: 82
End: 2022-07-18

## 2022-07-18 DIAGNOSIS — C78.6 HEPATOCELLULAR CARCINOMA METASTATIC TO PERITONEUM: Primary | ICD-10-CM

## 2022-07-18 DIAGNOSIS — C22.0 HEPATOCELLULAR CARCINOMA METASTATIC TO PERITONEUM: Primary | ICD-10-CM

## 2022-07-18 LAB — BACTERIA SPEC AEROBE CULT: NORMAL

## 2022-07-18 NOTE — OUTREACH NOTE
Prep Survey    Flowsheet Row Responses   [facility] facility patient discharged from? [facility]   Is LACE score < 7 ? No   Emergency Room discharge w/ pulse ox? No   Eligibility Readm Mgmt   Discharge diagnosis Bilateral Pulmonary Emboli   Does the patient have one of the following disease processes/diagnoses(primary or secondary)? Other   Does the patient have Home health ordered? No   Is there a DME ordered? Yes   What DME was ordered? RW per [facility]    General alerts for this patient Pt has declined Hospice and is working with Palliative care.    Prep survey completed? Yes          ALFREDO BELLE - Registered Nurse

## 2022-07-18 NOTE — PROGRESS NOTES
Oral Chemotherapy - New Referral    Received a referral from Dr. Pollock    Treatment Plan: Lenvima (lenvatinib)  Start date of treatment planned for: As soon as oral specialty medication is available.  Indication: metastatic hepatocellular carcinoma  Relevant past treatments: atezolizumab + bevacizumab  Is the therapy appropriate based on treatment guidelines and FDA labeling?: yes  Therapeutic Goals: Continue treatment until progression or intolerable toxicity  Patient can self-administer oral medications: Yes    • Drug-Drug Interactions: The current medication list was reviewed and there are no relevant drug-drug interactions.  • Medication Allergies: The patient has no relevant allergies as it relates to their oral specialty medication  • Review of Labs/Dose Adjustments: The patient's most recent labs were reviewed and all are WNL to start treatment at this dose.      Monitor renal function.  Currently CrCl = 25 and GFr = 44.  Patient will need a baseline Echo and EKG.  Patient weights 57.4 kg which is why dose is reduced from 12mg daily originally.  Per PI, if weight < 60kg then reduce dosage to 8mg daily.    A prescription was released to [facility] specialty pharmacy for   Drug: Lenvima (lenvatinib)  Strength: 4 mg  Directions: Take 2 capsules by mouth daily  Quantity: 60 each (60 capsules)  Refills: 5    Pharmacy education is not yet scheduled. and CCA and consent will be signed at that time.     Due to insurance restrictions, [facility] can not fill this script. Sent to [facility].    Cande Ojeda PharmD, [facility]  Oncology Clinical Pharmacist  7/18/2022  09:18 EDT

## 2022-07-19 LAB
BACTERIA SPEC AEROBE CULT: NORMAL
BACTERIA SPEC AEROBE CULT: NORMAL

## 2022-07-21 ENCOUNTER — READMISSION MANAGEMENT (OUTPATIENT)
Dept: CALL CENTER | Facility: HOSPITAL | Age: 82
End: 2022-07-21

## 2022-07-21 ENCOUNTER — SPECIALTY PHARMACY (OUTPATIENT)
Dept: ONCOLOGY | Facility: HOSPITAL | Age: 82
End: 2022-07-21

## 2022-07-21 NOTE — OUTREACH NOTE
Medical Week 1 Survey    Flowsheet Row Responses   [facility] patient discharged from? [place]   Does the patient have one of the following disease processes/diagnoses(primary or secondary)? Other   Week 1 attempt successful? Yes   Call start time 1545   Call end time 1550   Discharge diagnosis Bilateral Pulmonary Emboli   Meds reviewed with patient/caregiver? Yes   Is the patient having any side effects they believe may be caused by any medication additions or changes? Yes   Side effects comments  Gabapentin causing mental alteration , has stopped taking, will discuss with PCP   Does the patient have all medications ordered at discharge? Yes   Is the patient taking all medications as directed (includes completed medication regime)? Yes   Does the patient have a primary care provider?  Yes   Does the patient have an appointment with their PCP within 7 days of discharge? Yes   Has the patient kept scheduled appointments due by today? Yes   Did the patient receive a copy of their discharge instructions? Yes   Nursing interventions Reviewed instructions with patient   What is the patient's perception of their health status since discharge? Improving   Is the patient/caregiver able to teach back signs and symptoms related to disease process for when to call PCP? Yes   Is the patient/caregiver able to teach back signs and symptoms related to disease process for when to call 911? Yes   Is the patient/caregiver able to teach back the hierarchy of who to call/visit for symptoms/problems? PCP, Specialist, Home health nurse, Urgent Care, ED, 911 Yes   If the patient is a current smoker, are they able to teach back resources for cessation? Not a smoker   Additional teach back comments states throat still sore, now able to tolerate regular diet, swallowing, etc   Week 1 call completed? Yes          CHAPINCITO MEAD - Registered Nurse

## 2022-07-21 NOTE — PROGRESS NOTES
Specialty Pharmacy Patient Management Program  Oncology Initial Assessment       Desiree Forte is a 82 y.o. female with hepatocellular carcinoma seen by an Oncology provider and enrolled in the Oncology Patient Management program offered by [facility].  An initial outreach was conducted, including assessment of therapy appropriateness and specialty medication education for Lenvima (lenvatinib). The patient was introduced to services offered by [facility], including: regular assessments, refill coordination, curbside pick-up or mail order delivery options, prior authorization maintenance, and financial assistance programs as applicable. The patient was also provided with contact information for the pharmacy team.     Regimen: Lenvatinib - Take 8 mg (2 capsules) by mouth once a day.    Start date of oral specialty medication: 7/22/22    Relevant Past Medical History, Comorbidities, and Vaccines  Relevant medical history and concomitant health conditions were discussed with the patient. The patient's chart has been reviewed for relevant past medical history and comorbid health conditions and updated as necessary.  Vaccines are coordinated by the patient's oncologist and primary care provider.  Past Medical History:   Diagnosis Date   • Abnormal heart rhythm    • Cancer (HCC)     breast   • Clostridioides difficile diarrhea    • Congestive heart failure (CHF) (HCC)    • Hyperlipidemia    • Irregular heart beat    • Migraine     occular      Social History     Socioeconomic History   • Marital status:    Tobacco Use   • Smoking status: Never Smoker   • Smokeless tobacco: Never Used   Vaping Use   • Vaping Use: Never used   Substance and Sexual Activity   • Alcohol use: Not Currently   • Drug use: Never   • Sexual activity: Defer       Allergies  Known allergies and reactions were discussed with the patient. The patient's chart has been reviewed for allergy  information and updated as necessary.   Metformin and Statins    Current Medication List  This medication list has been reviewed with the patient and evaluated for any interactions or necessary modifications/recommendations, and updated to include all prescription medications, OTC medications, and supplements the patient is currently taking.  This list reflects what is contained in the patient's profile, which has also been marked as reviewed to communicate to other providers it is the most up to date version of the patient's current medication therapy.   Prior to Admission medications    Medication Sig Start Date End Date Taking? Authorizing Provider   carvedilol (COREG) 6.25 MG tablet Take 1 tablet by mouth 2 (Two) Times a Day. 8/10/21   Carlyle Saucedo MD   Flovent  MCG/ACT inhaler Inhale 1-2 puffs 2 (Two) Times a Day. 3/9/22   Gauri Larose MD   gabapentin (NEURONTIN) 100 MG capsule Take 2 capsules by mouth Every 12 (Twelve) Hours for 7 days. 7/17/22 7/24/22  Sussy Montero DO   HYDROcod Polst-CPM Polst ER (Tussionex Pennkinetic ER) 10-8 MG/5ML ER suspension Take 5 mL by mouth Every 12 (Twelve) Hours As Needed for Cough. 7/18/22   Primitivo Pollock MD   Lenvatinib, 4 MG Daily Dose, 4 MG capsule therapy Take 2 capsules by mouth Daily. 7/18/22   Primitivo Pollock MD   promethazine-dextromethorphan (PROMETHAZINE-DM) 6.25-15 MG/5ML syrup Take 5 mL by mouth 4 (Four) Times a Day As Needed for Cough.    Gauri Larose MD   spironolactone (ALDACTONE) 25 MG tablet Take 0.5 tablets by mouth Daily. 5/20/22   Carlyle Saucedo MD   torsemide (DEMADEX) 20 MG tablet Take 1 tablet by mouth Daily. 11/1/21   Carlyle Saucedo MD   traMADol (ULTRAM) 50 MG tablet Take 1 tablet by mouth 2 (Two) Times a Day As Needed for Moderate Pain  for up to 7 days. 7/17/22 7/24/22  Sussy Montero DO       Drug Interactions  • Reviewed concomitant medications, allergies, labs,  comorbidities/medical history, and immunization history.   • Drug-drug interactions noted and discussed during education: no significant drug interactions noted. . Reminded the patient to let us know before making any changes or starting any new prescription or OTC medications so we can first assess drug interactions.    Relevant Laboratory Values  Lab Results   Component Value Date    GLUCOSE 121 (H) 07/16/2022    CALCIUM 8.8 07/16/2022     07/16/2022    K 4.3 07/16/2022    CO2 30.0 (H) 07/16/2022     07/16/2022    BUN 31 (H) 07/16/2022    CREATININE 1.22 (H) 07/16/2022    EGFRIFNONA 43 (L) 11/15/2021    BCR 25.4 (H) 07/16/2022    ANIONGAP 6.0 07/16/2022     Lab Results   Component Value Date    WBC 6.39 07/17/2022    RBC 3.96 07/17/2022    HGB 11.6 (L) 07/17/2022    HCT 35.2 07/17/2022    MCV 88.9 07/17/2022    MCH 29.3 07/17/2022    MCHC 33.0 07/17/2022    RDW 19.2 (H) 07/17/2022    RDWSD 60.7 (H) 07/17/2022    MPV 10.2 07/17/2022     07/17/2022    NEUTRORELPCT 73.5 07/17/2022    LYMPHORELPCT 10.2 (L) 07/17/2022    MONORELPCT 11.3 07/17/2022    EOSRELPCT 3.9 07/17/2022    BASORELPCT 0.8 07/17/2022    AUTOIGPER 0.3 07/17/2022    NEUTROABS 4.70 07/17/2022    LYMPHSABS 0.65 (L) 07/17/2022    MONOSABS 0.72 07/17/2022    EOSABS 0.25 07/17/2022    BASOSABS 0.05 07/17/2022    AUTOIGNUM 0.02 07/17/2022    NRBC 0.0 07/17/2022       Initial Education Provided for Specialty Medication  The patient has been provided with the following education. All questions and concerns have been addressed prior to the patient receiving the medication, and the patient has verbalized understanding of the education and any materials provided.  Additional patient education shall be provided and documented upon request by the patient, provider or payer.      Provided patient with:   Chemo calendar to help improve adherence., Education sheets about the medication, 24-hour clinic phone number and my contact information and  instructions to call should additional questions arise.     Medication Education Sheets Provided: (select all that apply)  • Oral Specialty Medication: Lenvima (lenvatinib)  • IV: None  • Steroid: None    Other Education Sheets Provided: (select all that apply)  Adherence, Diarrhea, Hand-Foot Syndrome and Symptom Tracker Sheet and TRAE Information    TOPICS COMMENTS   Storage and Handling of Oral Specialty Medication Store in the original container, in a dry location out of direct sunlight, and out of reach of children or pets. and Store at room temperature.  Discussed safe handling and what to do with any unused medication.   Administration of Oral Specialty Medication Take with or without food at the same time(s) each day.   Adherence to Oral Specialty Regimen and Handling Missed Doses Patient is likely to have good treatment adherence; reinforced the importance of adherence. Reviewed how to address missed doses and to let us know of any missed doses.   Anemia: role of RBC, cause, s/s, ways to manage, role of transfusion Reviewed the role of RBC and the use of transfusions if hemoglobin decreases too much.  Patient to notify us if they experience shortness of breath, dizziness, or palpitations.  Also let patient know they could feel more tired than usual and to try to stay active, but rest if they need to.    Thrombocytopenia: role of platelet, cause, s/s, ways to prevent bleeding, things to avoid, when to seek help Reviewed the role of platelets in blood clotting and when to call clinic (bloody nose that bleeds for 5 mins despite pressure, a cut that won't stop bleeding despite pressure, gums that bleed excessively with brushing or flossing). Recommended using an electric razor, soft bristle toothbrush, and blowing your nose gently.    Neutropenia: role of WBC, cause, infection precautions, s/s of infection, when to call MD Reviewed the role of WBC, good infection prevention practices, and when to call the clinic  (temperature 100.4F, sore throat, burning urination, etc)  • COVID Vaccines: 2 doses plus booster  • Flu Vaccine: No vaccine   Nutrition and Appetite Changes:  importance of maintaining healthy diet & weight, ways to manage to improve intake, dietary consult, exercise regimen Discussed risk of decreased appetite and weight loss, reviewed plan for small more frequent meals.    Diarrhea: causes, s/s of dehydration, ways to manage, dietary changes, when to call MD Discussed risk of diarrhea. Instructed patient that they can use OTC loperamide at first presentation of diarrhea, but call MD if 4-6 episodes in 24 hours not relieved by OTC loperamide.    Patient has had a recent C. Diff infection that she has had to manage.   Nausea/Vomiting: cause, use of antiemetics, dietary changes, when to call MD • Emetic risk: Low-Minimal  • PRN home meds: Ondansetron     Instructed patient to let us know if she needs more Zofran.    Instructed the patient to take a dose of the PRN medication at the first onset of nausea and if it's not working to call us for additional medications.  Also provided non-drug measures to mitigate nausea.   Mouth Sores: causes, oral care, ways to manage Mouth sores can be prevented by making a mouth wash mixture of salt, baking soda, and water. The patient was instructed to swish and spit four times daily after meals and before bedtime.  Use of a soft bristle toothbrush was recommended.  The patient was instructed to avoid alcohol-containing OTC mouthwashes.    Infertility and Sexuality:  causes, fertility preservation options, sexuality changes, ways to manage, importance of birth control Discussed safe sex practices.   Pain: causes, ways to manage Discussed muscle and joint aches/pains, and recommended the use of OTC pain relief with ibuprofen or acetaminophen if needed.    Discussed increased incidence of abdominal pain and headaches.   Skin/Nail Changes: cause, s/s, ways to manage Hand-foot syndrome  was discussed, including the s/s, prevention measures, and treatment measures. A supplementary handout with this information was also given to the patient.        Organ Toxicities: cause, s/s, need for diagnostic tests, labs, when to notify MD Discussed potential effects on organ systems, monitoring, diagnostic tests, labs, and when to notify their MD. Discussed the signs/symptoms of the following: hepatotoxicity, cardiotoxicity, hypertension, delayed wound healing and GI perforation     Additionally discussed the following rare but serious cardiovascular events  -increased risk of a blood clot and signs/symptoms (DVT/PE)  -increased risk of bleeding  -QT prolongation, instructed patient to let us know if she notices her heart beating differently than normal or if she experiences any chest pain or tightness    Discussed the possibility for lab abnormalities such as low calcium and fluctuating liver function tests. Ensured patient that we will be monitoring her labs throughout her therapy    Discussed the possibility for damage to her thyroid and discussed how they would manage her with levothyroxine if that were to occur.    Home Care: how to manage bodily fluids Counseled on management of soiled linens and proper flush technique.  Discussed how to manage all the side effects at home and advised when to contact the MD office   Miscellaneous   • Lab Draws: Blood work on days 1 and 15 of cycle 1 and 2. Then only on day 1 of subsequent cycles.    Additionally discussed that this medication could cause some voice changes or hoarseness. 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      Adherence and Self-Administration  • Barriers to Patient Adherence and/or Self-Administration: none  • Methods for Supporting Patient Adherence and/or Self-Administration: dosing calendar  • Expected duration of therapy: Until disease progression or intolerable toxicity    Goals of Therapy  • Patient Goals of Therapy:   o Consistently take  medications as prescribed  o Patient will adhere to medication regimen  o Patient will report any medication side effects to healthcare provider  • Clinical Goals:   o Support patient understanding of medication regimen  o Ensure patient knows the pharmacy contact information  o Schedule regular follow-up to monitor the treatment serious adverse events  o Schedule regular follow-up to confirm medication adherence  o Schedule regular follow-up to monitor disease progression or stabilty    Attestation  I attest that the initiated specialty medication(s) are appropriate for the patient based on my assessment.  If the prescribed therapy is at any point deemed not appropriate based on the current or future assessments, a consultation will be initiated with the patient's specialty care provider to determine the best course of action. The revised plan of therapy will be documented along with any additional patient education provided.     Landry Horvath, Pharmacy Intern    Date and Time: 7/21/2022 12:14

## 2022-07-25 DIAGNOSIS — R26.81 UNSTEADY GAIT: ICD-10-CM

## 2022-07-25 DIAGNOSIS — C78.6 HEPATOCELLULAR CARCINOMA METASTATIC TO PERITONEUM: Primary | ICD-10-CM

## 2022-07-25 DIAGNOSIS — C22.0 HEPATOCELLULAR CARCINOMA METASTATIC TO PERITONEUM: Primary | ICD-10-CM

## 2022-07-27 NOTE — PROGRESS NOTES
I completed an independent review of the medication order and prescription. I confirm the prescription was sent to [facility] specialty pharmacy as described.    Her baseline ECHO was 36-40% which is reduced but is consistent with her ECHO from 6/2/22.  Her 6/13/22 QTc = 398 msec which is normal.    Because of her decreased renal function she needs a dose reduction to at least 10 mg/day, but she's being started at an even lower dose of 8 mg to assess for tolerability, which is very reasonable given her age and performance status.    Her most recent TSH values have been above the upper limit of normal (0.270-4.200).    11/15/21 = 6.420  5/12/22 = 12.320  6/2/22 = 7.360  7/14/22 = 11.760        Her free T4 values have been normal, but towards the upper limit (0.93-1.70).    10/4/21 = 1.69  11/15/21 = 1.59  6/2/22 = 1.65  7/14/22 = 1.69    I reached out to Dr. Pickens to make sure he's aware of this to see if he wants to do any further workup or possibly refer her to endocrinology.      Roma Olguin, PharmD, [facility] - Oncology Clinical Pharmacist [phone]    7/27/2022  09:50 EDT

## 2022-07-28 DIAGNOSIS — C78.6 HEPATOCELLULAR CARCINOMA METASTATIC TO PERITONEUM: Primary | ICD-10-CM

## 2022-07-28 DIAGNOSIS — C22.0 HEPATOCELLULAR CARCINOMA METASTATIC TO PERITONEUM: Primary | ICD-10-CM

## 2022-07-28 RX ORDER — DRONABINOL 5 MG/1
5 CAPSULE ORAL
Qty: 60 CAPSULE | Refills: 0 | Status: SHIPPED | OUTPATIENT
Start: 2022-07-28

## 2022-07-29 ENCOUNTER — APPOINTMENT (OUTPATIENT)
Dept: CT IMAGING | Facility: HOSPITAL | Age: 82
End: 2022-07-29

## 2022-08-02 ENCOUNTER — TELEPHONE (OUTPATIENT)
Dept: ONCOLOGY | Facility: CLINIC | Age: 82
End: 2022-08-02

## 2022-08-02 ENCOUNTER — APPOINTMENT (OUTPATIENT)
Dept: GENERAL RADIOLOGY | Facility: HOSPITAL | Age: 82
End: 2022-08-02

## 2022-08-02 ENCOUNTER — HOSPITAL ENCOUNTER (EMERGENCY)
Facility: HOSPITAL | Age: 82
Discharge: HOME | End: 2022-08-02
Attending: EMERGENCY MEDICINE | Admitting: EMERGENCY MEDICINE

## 2022-08-02 VITALS
OXYGEN SATURATION: 98 % | RESPIRATION RATE: 16 BRPM | HEART RATE: 73 BPM | BODY MASS INDEX: 17.07 KG/M2 | DIASTOLIC BLOOD PRESSURE: 87 MMHG | SYSTOLIC BLOOD PRESSURE: 144 MMHG | TEMPERATURE: 98.8 F | WEIGHT: 100 LBS | HEIGHT: 64 IN

## 2022-08-02 DIAGNOSIS — C22.9 MALIGNANT NEOPLASM OF LIVER, UNSPECIFIED LIVER MALIGNANCY TYPE: ICD-10-CM

## 2022-08-02 DIAGNOSIS — C22.0 HEPATOCELLULAR CARCINOMA METASTATIC TO PERITONEUM: ICD-10-CM

## 2022-08-02 DIAGNOSIS — R06.00 ACUTE DYSPNEA: ICD-10-CM

## 2022-08-02 DIAGNOSIS — C78.6 HEPATOCELLULAR CARCINOMA METASTATIC TO PERITONEUM: ICD-10-CM

## 2022-08-02 DIAGNOSIS — R06.09 DOE (DYSPNEA ON EXERTION): Primary | ICD-10-CM

## 2022-08-02 DIAGNOSIS — I10 ESSENTIAL HYPERTENSION: ICD-10-CM

## 2022-08-02 DIAGNOSIS — I49.3 PVC'S (PREMATURE VENTRICULAR CONTRACTIONS): ICD-10-CM

## 2022-08-02 DIAGNOSIS — I26.99 OTHER PULMONARY EMBOLISM WITHOUT ACUTE COR PULMONALE, UNSPECIFIED CHRONICITY: ICD-10-CM

## 2022-08-02 DIAGNOSIS — Z91.89 AT RISK FOR SLEEP APNEA: ICD-10-CM

## 2022-08-02 DIAGNOSIS — R04.2 HEMOPTYSIS: ICD-10-CM

## 2022-08-02 LAB
ALBUMIN SERPL-MCNC: 3.3 G/DL (ref 3.5–5.2)
ALBUMIN/GLOB SERPL: 0.9 G/DL
ALP SERPL-CCNC: 129 U/L (ref 39–117)
ALT SERPL W P-5'-P-CCNC: 10 U/L (ref 1–33)
ANION GAP SERPL CALCULATED.3IONS-SCNC: 12 MMOL/L (ref 5–15)
AST SERPL-CCNC: 27 U/L (ref 1–32)
BASOPHILS # BLD AUTO: 0.09 10*3/MM3 (ref 0–0.2)
BASOPHILS NFR BLD AUTO: 0.8 % (ref 0–1.5)
BILIRUB SERPL-MCNC: 0.9 MG/DL (ref 0–1.2)
BUN SERPL-MCNC: 21 MG/DL (ref 8–23)
BUN/CREAT SERPL: 25.3 (ref 7–25)
CALCIUM SPEC-SCNC: 9.7 MG/DL (ref 8.6–10.5)
CHLORIDE SERPL-SCNC: 97 MMOL/L (ref 98–107)
CO2 SERPL-SCNC: 28 MMOL/L (ref 22–29)
CREAT SERPL-MCNC: 0.83 MG/DL (ref 0.57–1)
DEPRECATED RDW RBC AUTO: 49.8 FL (ref 37–54)
EGFRCR SERPLBLD CKD-EPI 2021: 70.5 ML/MIN/1.73
EOSINOPHIL # BLD AUTO: 0.11 10*3/MM3 (ref 0–0.4)
EOSINOPHIL NFR BLD AUTO: 1 % (ref 0.3–6.2)
ERYTHROCYTE [DISTWIDTH] IN BLOOD BY AUTOMATED COUNT: 16.3 % (ref 12.3–15.4)
FLUAV RNA RESP QL NAA+PROBE: NOT DETECTED
FLUBV RNA RESP QL NAA+PROBE: NOT DETECTED
GLOBULIN UR ELPH-MCNC: 3.7 GM/DL
GLUCOSE SERPL-MCNC: 97 MG/DL (ref 65–99)
HCT VFR BLD AUTO: 45.4 % (ref 34–46.6)
HGB BLD-MCNC: 14.7 G/DL (ref 12–15.9)
HOLD SPECIMEN: NORMAL
IMM GRANULOCYTES # BLD AUTO: 0.1 10*3/MM3 (ref 0–0.05)
IMM GRANULOCYTES NFR BLD AUTO: 0.9 % (ref 0–0.5)
LYMPHOCYTES # BLD AUTO: 1.3 10*3/MM3 (ref 0.7–3.1)
LYMPHOCYTES NFR BLD AUTO: 11.7 % (ref 19.6–45.3)
MCH RBC QN AUTO: 28.9 PG (ref 26.6–33)
MCHC RBC AUTO-ENTMCNC: 32.4 G/DL (ref 31.5–35.7)
MCV RBC AUTO: 89.2 FL (ref 79–97)
MONOCYTES # BLD AUTO: 1.27 10*3/MM3 (ref 0.1–0.9)
MONOCYTES NFR BLD AUTO: 11.5 % (ref 5–12)
NEUTROPHILS NFR BLD AUTO: 74.1 % (ref 42.7–76)
NEUTROPHILS NFR BLD AUTO: 8.2 10*3/MM3 (ref 1.7–7)
NRBC BLD AUTO-RTO: 0 /100 WBC (ref 0–0.2)
NT-PROBNP SERPL-MCNC: ABNORMAL PG/ML (ref 0–1800)
PLATELET # BLD AUTO: 524 10*3/MM3 (ref 140–450)
PMV BLD AUTO: 9.7 FL (ref 6–12)
POTASSIUM SERPL-SCNC: 4.3 MMOL/L (ref 3.5–5.2)
PROT SERPL-MCNC: 7 G/DL (ref 6–8.5)
QT INTERVAL: 408 MS
QTC INTERVAL: 455 MS
RBC # BLD AUTO: 5.09 10*6/MM3 (ref 3.77–5.28)
SARS-COV-2 RNA RESP QL NAA+PROBE: NOT DETECTED
SODIUM SERPL-SCNC: 137 MMOL/L (ref 136–145)
TROPONIN T SERPL-MCNC: <0.01 NG/ML (ref 0–0.03)
WBC NRBC COR # BLD: 11.07 10*3/MM3 (ref 3.4–10.8)
WHOLE BLOOD HOLD COAG: NORMAL
WHOLE BLOOD HOLD SPECIMEN: NORMAL

## 2022-08-02 PROCEDURE — 87636 SARSCOV2 & INF A&B AMP PRB: CPT | Performed by: EMERGENCY MEDICINE

## 2022-08-02 PROCEDURE — 73560 X-RAY EXAM OF KNEE 1 OR 2: CPT

## 2022-08-02 PROCEDURE — 71045 X-RAY EXAM CHEST 1 VIEW: CPT

## 2022-08-02 PROCEDURE — 84484 ASSAY OF TROPONIN QUANT: CPT | Performed by: EMERGENCY MEDICINE

## 2022-08-02 PROCEDURE — 80053 COMPREHEN METABOLIC PANEL: CPT | Performed by: EMERGENCY MEDICINE

## 2022-08-02 PROCEDURE — 99284 EMERGENCY DEPT VISIT MOD MDM: CPT

## 2022-08-02 PROCEDURE — 83880 ASSAY OF NATRIURETIC PEPTIDE: CPT | Performed by: EMERGENCY MEDICINE

## 2022-08-02 PROCEDURE — 93005 ELECTROCARDIOGRAM TRACING: CPT | Performed by: EMERGENCY MEDICINE

## 2022-08-02 PROCEDURE — 85025 COMPLETE CBC W/AUTO DIFF WBC: CPT | Performed by: EMERGENCY MEDICINE

## 2022-08-02 RX ORDER — SODIUM CHLORIDE 0.9 % (FLUSH) 0.9 %
10 SYRINGE (ML) INJECTION AS NEEDED
Status: DISCONTINUED | OUTPATIENT
Start: 2022-08-02 | End: 2022-08-02 | Stop reason: HOSPADM

## 2022-08-02 NOTE — CASE MANAGEMENT/SOCIAL WORK
Continued Stay Note  [facility]     Patient Name: Desiree Forte  MRN: [id number]  Today's Date: 8/2/2022    Admit Date: 8/2/2022     Discharge Plan     Row Name 08/02/22 1314       Plan    Plan SW    Plan Comments SW’er spoke with patient nurse Krystal to obtain stats for ordering oxygen. Nurse explained the stats are noted in Epic and patient is running O2 stats at 95 on resting room air. SW’er explained the insurance qualification for oxygen included O2 stats 88 and below with a chronic diagnosis (related to a breathing condition).  SW’er will discuss with patient if she is willing to private pay for oxygen which is $100 a month.               Discharge Codes    No documentation.                     LALY Baum (Kay)

## 2022-08-02 NOTE — ED PROVIDER NOTES
EMERGENCY DEPARTMENT ENCOUNTER    Pt Name: Desiree Forte  MRN: [id number]  Pt :   1940  Room Number:    Date of encounter:  2022  PCP: Kristofer Elder MD  ED Provider: Adryan Crespo MD    Historian: Patient and daughter      HPI:  Chief Complaint: Dyspnea        Context: Desiree Forte is a 82 y.o. female who presents to the ED c/o increasing dyspnea.  The patient notes that her oxygen level will remain in the 90s while not on oxygen while at rest.  She drops into the mid 70s with any exertion.  This has been tested with her home pulse oximeter.  The patient suffered from breast cancer 25 years ago.  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  She follows up with Dr. Pickens.  The patient had a severe bout of C. difficile colitis which ended 3 months ago.  She reports that she can never take antibiotics again.  This apparently was told to her by an infectious disease expert per her report.  She also reports that she is unable to take any type of anticoagulant and is not a candidate for an inferior vena cava filter as well.  She did suffer hemoptysis and is just starting to get over that now.  The patient denies nausea, vomiting, diarrhea currently.  She has had no fevers or chills.  She reports that oxygen via nasal cannula does lessen her symptoms.  To this point she has not signed on with hospice.          PAST MEDICAL HISTORY  Past Medical History:   Diagnosis Date   • Abnormal heart rhythm    • Cancer (HCC)     breast   • Clostridioides difficile diarrhea    • Congestive heart failure (CHF) (HCC)    • Hyperlipidemia    • Irregular heart beat    • Migraine     occular          PAST SURGICAL HISTORY  Past Surgical History:   Procedure Laterality Date   • APPENDECTOMY     • BREAST SURGERY      lumpectomy with lymph nodes left    • COLONOSCOPY     • COLONOSCOPY N/A 2021    Procedure: COLONOSCOPY;  Surgeon:  Jesus Morton MD;  Location:  [facility];  Service: Gastroenterology;  Laterality: N/A;   • DIAGNOSTIC LAPAROSCOPY N/A 9/15/2021    Procedure: DIAGNOSTIC LAPAROSCOPY, OMENTAL BIOPSY AND PERITONEAL NODULE BIOPSY;  Surgeon: Nino Jaimes MD;  Location:  [facility];  Service: General;  Laterality: N/A;   • ENDOSCOPY N/A 9/7/2021    Procedure: ESOPHAGOGASTRODUODENOSCOPY WITH BIOPSY AND POLYPECTOMY;  Surgeon: Jesus Morton MD;  Location:  [facility];  Service: Gastroenterology;  Laterality: N/A;   • EYE SURGERY Bilateral     cataract   • HYSTERECTOMY           FAMILY HISTORY  Family History   Problem Relation Age of Onset   • Leukemia Mother    • Stroke Father    • Colon cancer Neg Hx    • Colon polyps Neg Hx    • Esophageal cancer Neg Hx          SOCIAL HISTORY  Social History     Socioeconomic History   • Marital status:    Tobacco Use   • Smoking status: Never Smoker   • Smokeless tobacco: Never Used   Vaping Use   • Vaping Use: Never used   Substance and Sexual Activity   • Alcohol use: Not Currently   • Drug use: Never   • Sexual activity: Defer         ALLERGIES  Metformin and Statins        REVIEW OF SYSTEMS  Review of Systems       All systems reviewed and negative except for those discussed in HPI.       PHYSICAL EXAM    I have reviewed the triage vital signs and nursing notes.    ED Triage Vitals [08/02/22 1121]   Temp Heart Rate Resp BP SpO2   98.8 °F (37.1 °C) 69 16 109/86 96 %      Temp src Heart Rate Source Patient Position BP Location FiO2 (%)   Oral Monitor Sitting Left arm --       Physical Exam  GENERAL:   Appears alert, oriented, decisive.  HENT: Nares patent.  EYES: No scleral icterus.  CV: Regular rhythm, regular rate.  No murmurs gallops rubs  RESPIRATORY: Normal effort.  No audible wheezes, rales or rhonchi.  Clear to auscultation  ABDOMEN: Soft, nontender  MUSCULOSKELETAL: No deformities.   NEURO: Alert, moves all extremities, follows commands.  SKIN: Warm, dry,  no rash visualized.        LAB RESULTS  Recent Results (from the past 24 hour(s))   ECG 12 Lead    Collection Time: 08/02/22 11:37 AM   Result Value Ref Range    QT Interval 408 ms    QTC Interval 455 ms       If labs were ordered, I independently reviewed the results.        RADIOLOGY  No Radiology Exams Resulted Within Past 24 Hours    I ordered and reviewed the above noted radiographic studies.        See radiologist's dictation for official interpretation.        PROCEDURES    Procedures    ECG 12 Lead   Preliminary Result   Test Reason : SOA Protocol   Blood Pressure :   */*   mmHG   Vent. Rate :  75 BPM     Atrial Rate :  75 BPM      P-R Int : 160 ms          QRS Dur : 100 ms       QT Int : 408 ms       P-R-T Axes :  44  42 -59 degrees      QTc Int : 455 ms      Normal sinus rhythm   Possible Left atrial enlargement   Left ventricular hypertrophy   ST & T wave abnormality, consider lateral ischemia   Abnormal ECG   When compared with ECG of 13-JUL-2022 09:53,   Nonspecific T wave abnormality, worse in Inferior leads   T wave inversion now evident in Lateral leads   QT has lengthened      Referred By: EDMD           Confirmed By:           MEDICATIONS GIVEN IN ER    Medications   sodium chloride 0.9 % flush 10 mL (has no administration in time range)         PROGRESS, DATA ANALYSIS, CONSULTS, AND MEDICAL DECISION MAKING    All labs have been independently reviewed by me.  All radiology studies have been reviewed by me and the radiologist dictating the report.   EKG's have been independently viewed and interpreted by me.      Differential diagnoses: Dyspnea with concern for potential worsening of her pulmonary emboli especially given the fact that she is not anticoagulated and does not have a inferior vena cava filter in place.  Pneumonia, COVID, etc. also considered.  The patient has had a very difficult position given all of the above.      ED Course as of 08/11/22 1513   Tue Aug 02, 2022   1239 We are  checking the patient's oxygen saturations with minor exertion.  We are attempting to see if she qualifies for home oxygen and will proceed accordingly.    The patient reports that she has been told to never take antibiotics for the rest of her life secondary to her history of severe C. difficile infection.  She has been deemed not a candidate for blood thinning secondary to her hemoptysis which has been present as recently as a couple days ago.  She has not a surgical candidate or even an intervention as far as Maury filter type of device. [MS]   1327 We have applied nasal cannula oxygen at 2 L/min and the patient is feeling improved.  I spoke with Dr. Pickens.  Case discussed in detail.  We both agree that home oxygen therapy is the most appropriate for this patient.  The patient is currently not under hospice care.  I am recommending that they switch over to considering that more seriously. [MS]   1522 We have arranged for outpatient oxygen therapy. [MS]      ED Course User Index  [MS] Adryan Crespo MD             AS OF 11:41 EDT VITALS:    BP - 109/86  HR - 69  TEMP - 98.8 °F (37.1 °C) (Oral)  O2 SATS - 96%                  DIAGNOSIS  Final diagnoses:   Acute dyspnea   Other pulmonary embolism without acute cor pulmonale, unspecified chronicity (HCC)   Malignant neoplasm of liver, unspecified liver malignancy type (HCC)   Hemoptysis         DISPOSITION  DISCHARGE    Patient discharged in stable condition.    Reviewed implications of results, diagnosis, meds, responsibility to follow up, warning signs and symptoms of possible worsening, potential complications and reasons to return to ER.    Patient/Family voiced understanding of above instructions.    Discussed plan for discharge, as there is no emergent indication for admission.  Pt/family is agreeable and understands need for follow up and possible repeat testing.  Pt/family is aware that discharge does not mean that nothing is wrong but that it indicates  no emergency is currently present that requires admission and they must continue care with follow-up as given below or with a physician of their choice.     FOLLOW-UP  Kristofer Elder MD  [address] [place] [address] [phone]          Primitivo Pollock MD  [address] [facility]  [address]  [place] [address]  [phone]          [facility] Emergency Department  [address] [facility] [address] [place] [address] [phone]    IF YOU HAVE ANY CONCERN OF WORSENING CONDITION         Medication List      No changes were made to your prescriptions during this visit.                  Adryan Crespo MD  08/11/22 8836

## 2022-08-02 NOTE — TELEPHONE ENCOUNTER
Patient's daughter called and wants to know if the ER could be informed she is coming she can't sit out in the waiting room? Please call.

## 2022-08-02 NOTE — DISCHARGE INSTRUCTIONS
We have arranged for home oxygen.    Please review the medications you are supposed to be taking according to prior physician recommendations. I have not changed your home medications during this visit. If your discharge instructions indicate that I have changed your home medications, this is not the case, and you should disregard. If you have any questions about the medication you should be taking at home, please call your physician.

## 2022-08-02 NOTE — CASE MANAGEMENT/SOCIAL WORK
Continued Stay Note  [facility]     Patient Name: Desiree Forte  MRN: [id number]  Today's Date: 8/2/2022    Admit Date: 8/2/2022     Discharge Plan     Row Name 08/02/22 1338       Plan    Plan SW    Plan Comments SW’er spoke with patient and daughter at bedside. They are agreeable to private pay for oxygen. SW’er contacted [employer] [phone] to provide oxygen services and deliver oxygen to bedside.                                     Discharge Codes    No documentation.                     LALY Baum (Kay)

## 2022-08-03 RX ORDER — CARVEDILOL 6.25 MG/1
TABLET ORAL
Qty: 180 TABLET | Refills: 3 | Status: SHIPPED | OUTPATIENT
Start: 2022-08-03

## 2022-08-11 ENCOUNTER — LAB (OUTPATIENT)
Dept: LAB | Facility: HOSPITAL | Age: 82
End: 2022-08-11

## 2022-08-11 ENCOUNTER — OFFICE VISIT (OUTPATIENT)
Dept: ONCOLOGY | Facility: CLINIC | Age: 82
End: 2022-08-11

## 2022-08-11 VITALS
RESPIRATION RATE: 18 BRPM | DIASTOLIC BLOOD PRESSURE: 75 MMHG | WEIGHT: 88 LBS | OXYGEN SATURATION: 90 % | HEIGHT: 64 IN | SYSTOLIC BLOOD PRESSURE: 119 MMHG | TEMPERATURE: 97.3 F | BODY MASS INDEX: 15.03 KG/M2 | HEART RATE: 71 BPM

## 2022-08-11 DIAGNOSIS — C22.0 HEPATOCELLULAR CARCINOMA METASTATIC TO PERITONEUM: ICD-10-CM

## 2022-08-11 DIAGNOSIS — C78.6 HEPATOCELLULAR CARCINOMA METASTATIC TO PERITONEUM: Primary | ICD-10-CM

## 2022-08-11 DIAGNOSIS — C78.6 HEPATOCELLULAR CARCINOMA METASTATIC TO PERITONEUM: ICD-10-CM

## 2022-08-11 DIAGNOSIS — C22.0 HEPATOCELLULAR CARCINOMA METASTATIC TO PERITONEUM: Primary | ICD-10-CM

## 2022-08-11 LAB
ALBUMIN SERPL-MCNC: 3.6 G/DL (ref 3.5–5.2)
ALBUMIN/GLOB SERPL: 0.9 G/DL
ALP SERPL-CCNC: 126 U/L (ref 39–117)
ALT SERPL W P-5'-P-CCNC: 9 U/L (ref 1–33)
ANION GAP SERPL CALCULATED.3IONS-SCNC: 13 MMOL/L (ref 5–15)
AST SERPL-CCNC: 29 U/L (ref 1–32)
BILIRUB SERPL-MCNC: 0.8 MG/DL (ref 0–1.2)
BUN SERPL-MCNC: 26 MG/DL (ref 8–23)
BUN/CREAT SERPL: 28.9 (ref 7–25)
CALCIUM SPEC-SCNC: 10.6 MG/DL (ref 8.6–10.5)
CHLORIDE SERPL-SCNC: 96 MMOL/L (ref 98–107)
CO2 SERPL-SCNC: 30 MMOL/L (ref 22–29)
CREAT SERPL-MCNC: 0.9 MG/DL (ref 0.57–1)
EGFRCR SERPLBLD CKD-EPI 2021: 64 ML/MIN/1.73
ERYTHROCYTE [DISTWIDTH] IN BLOOD BY AUTOMATED COUNT: 18 % (ref 12.3–15.4)
GLOBULIN UR ELPH-MCNC: 3.8 GM/DL
GLUCOSE SERPL-MCNC: 104 MG/DL (ref 65–99)
HCT VFR BLD AUTO: 48.6 % (ref 34–46.6)
HGB BLD-MCNC: 14.9 G/DL (ref 12–15.9)
LYMPHOCYTES # BLD AUTO: 1 10*3/MM3 (ref 0.7–3.1)
LYMPHOCYTES NFR BLD AUTO: 15.1 % (ref 19.6–45.3)
MCH RBC QN AUTO: 27.4 PG (ref 26.6–33)
MCHC RBC AUTO-ENTMCNC: 30.7 G/DL (ref 31.5–35.7)
MCV RBC AUTO: 89.4 FL (ref 79–97)
MONOCYTES # BLD AUTO: 0.4 10*3/MM3 (ref 0.1–0.9)
MONOCYTES NFR BLD AUTO: 6.6 % (ref 5–12)
NEUTROPHILS NFR BLD AUTO: 5.2 10*3/MM3 (ref 1.7–7)
NEUTROPHILS NFR BLD AUTO: 78.3 % (ref 42.7–76)
PLATELET # BLD AUTO: 279 10*3/MM3 (ref 140–450)
PMV BLD AUTO: 7.8 FL (ref 6–12)
POTASSIUM SERPL-SCNC: 4 MMOL/L (ref 3.5–5.2)
PROT SERPL-MCNC: 7.4 G/DL (ref 6–8.5)
RBC # BLD AUTO: 5.44 10*6/MM3 (ref 3.77–5.28)
SODIUM SERPL-SCNC: 139 MMOL/L (ref 136–145)
TSH SERPL DL<=0.05 MIU/L-ACNC: 32.85 UIU/ML (ref 0.27–4.2)
WBC NRBC COR # BLD: 6.7 10*3/MM3 (ref 3.4–10.8)

## 2022-08-11 PROCEDURE — 85025 COMPLETE CBC W/AUTO DIFF WBC: CPT

## 2022-08-11 PROCEDURE — 99214 OFFICE O/P EST MOD 30 MIN: CPT | Performed by: INTERNAL MEDICINE

## 2022-08-11 PROCEDURE — 84443 ASSAY THYROID STIM HORMONE: CPT

## 2022-08-11 PROCEDURE — 82105 ALPHA-FETOPROTEIN SERUM: CPT

## 2022-08-11 PROCEDURE — 80053 COMPREHEN METABOLIC PANEL: CPT

## 2022-08-11 NOTE — PROGRESS NOTES
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  female presents today for follow-up of her hepatocellular carcinoma.  She has been started on lenvatinib and she has been on it for a month.  Unfortunately her performance status declined considerably.  She is now 88 pounds and supremely weak.  She cannot eat anything.  Her taste has changed significantly.  She has become increasingly bedbound.  She is getting bedsores.    Past medical history, social history and family history was reviewed 08/11/22 and unchanged from prior visit.    Review of Systems:    Review of Systems   Constitutional: Positive for appetite change, fatigue and unexpected weight change.   HENT:   Positive for sore throat.    Eyes: Negative.    Respiratory: Positive for shortness of breath.    Cardiovascular: Negative.    Gastrointestinal: Positive for abdominal distention and diarrhea.   Endocrine: Negative.    Genitourinary: Negative.     Musculoskeletal: Negative.    Neurological: Negative.    Hematological: Negative.    Psychiatric/Behavioral: Negative.             Medications:        Current Outpatient Medications:   •  carvedilol (COREG) 6.25 MG tablet, TAKE 1 TABLET BY MOUTH TWICE A DAY, Disp: 180 tablet, Rfl: 3  •  dronabinol (Marinol) 5 MG capsule, Take 1 capsule by mouth 2 (Two) Times a Day Before Meals., Disp: 60 capsule, Rfl: 0  •  Flovent  MCG/ACT inhaler, Inhale 1-2 puffs 2 (Two) Times a Day., Disp: , Rfl:   •  nystatin (MYCOSTATIN) 100,000 unit/mL suspension, Swish and swallow 5 mL 4 (Four) Times a Day., Disp: 475 mL, Rfl: 1  •  " promethazine-dextromethorphan (PROMETHAZINE-DM) 6.25-15 MG/5ML syrup, Take 5 mL by mouth 4 (Four) Times a Day As Needed for Cough., Disp: , Rfl:   •  spironolactone (ALDACTONE) 25 MG tablet, Take 0.5 tablets by mouth Daily., Disp: 45 tablet, Rfl: 3  •  torsemide (DEMADEX) 20 MG tablet, Take 1 tablet by mouth Daily., Disp: 90 tablet, Rfl: 3  •  gabapentin (NEURONTIN) 100 MG capsule, Take 2 capsules by mouth Every 12 (Twelve) Hours for 7 days., Disp: 28 capsule, Rfl: 0  •  HYDROcod Polst-CPM Polst ER (Tussionex Pennkinetic ER) 10-8 MG/5ML ER suspension, Take 5 mL by mouth Every 12 (Twelve) Hours As Needed for Cough., Disp: 120 mL, Rfl: 0    Pain Medications             gabapentin (NEURONTIN) 100 MG capsule Take 2 capsules by mouth Every 12 (Twelve) Hours for 7 days.             ALLERGIES:    Allergies   Allergen Reactions   • Metformin Diarrhea   • Statins Myalgia     Muscle pain         Physical Exam    VITAL SIGNS:  /75 Comment: LUE  Pulse 71   Temp 97.3 °F (36.3 °C) (Infrared)   Resp 18   Ht 162.6 cm (64\")   Wt 39.9 kg (88 lb)   SpO2 90% Comment: RA  BMI 15.11 kg/m²     ECOG score: 0           Wt Readings from Last 3 Encounters:   08/11/22 39.9 kg (88 lb)   08/02/22 45.4 kg (100 lb)   07/13/22 45.4 kg (100 lb)       Body mass index is 15.11 kg/m². Body surface area is 1.38 meters squared.    Pain Score    08/11/22 1506   PainSc: 0-No pain           Performance Status: 1    General: cachetic appearing, in no acute distress  HEENT: sclera anicteric, neck is supple  Lymphatics: no cervical, supraclavicular, or axillary adenopathy  Extremities: no lower extremity edema  Skin: no rashes, lesions, bruising, or petechiae  Msk:  Shows significant weakness of the large muscle groups  Psych: Mood is stable        RECENT LABS:    Lab Results   Component Value Date    HGB 14.9 08/11/2022    HCT 48.6 (H) 08/11/2022    MCV 89.4 08/11/2022     08/11/2022    WBC 6.70 08/11/2022    NEUTROABS 5.20 08/11/2022    " LYMPHSABS 1.00 08/11/2022    MONOSABS 0.40 08/11/2022    EOSABS 0.11 08/02/2022    BASOSABS 0.09 08/02/2022       Lab Results   Component Value Date    GLUCOSE 104 (H) 08/11/2022    BUN 26 (H) 08/11/2022    CREATININE 0.90 08/11/2022     08/11/2022    K 4.0 08/11/2022    CL 96 (L) 08/11/2022    CO2 30.0 (H) 08/11/2022    CALCIUM 10.6 (H) 08/11/2022    PROTEINTOT 7.4 08/11/2022    ALBUMIN 3.60 08/11/2022    BILITOT 0.8 08/11/2022    ALKPHOS 126 (H) 08/11/2022    AST 29 08/11/2022    ALT 9 08/11/2022     Lab Results   Component Value Date     (H) 06/02/2022     (H) 06/02/2022     (H) 05/12/2022    AFP 1,090 (H) 11/15/2021    AFP 1,722 (H) 10/25/2021     Lab Results   Component Value Date    PROBNP 46,912.0 (H) 08/02/2022    PROBNP >70,000.0 (H) 07/13/2022    PROBNP 13,256.0 (H) 10/25/2021             Clinical Information    Omental mass.   Final Diagnosis   1.  OMENTAL BIOPSY:  Involved by malignant neoplasm with evidence of hepatocellular differentiation (see comment).    2.  PERITONEAL NODULE:  Involved by malignant neoplasm with evidence of hepatocellular differentiation (see comment).   Electronically signed by Sean Street MD on 9/20/2021 at 1050   Comment    Both the omentum and the peritoneal nodule show a similar appearance with a neoplasm consisting of polygonal cells with slightly granular clear cytoplasm and mild nuclear pleomorphism.  No definitive gland formation is noted.  No evidence of squamous differentiation is identified.  Numerous immunohistochemical stains were undertaken.  Tumor is negative for CD45, CD30, CK 5/6, CD68, CD45, PLAP desmin, PAX 8, vimentin, TTF-1, S100, WT1, p16, melanin, GATA3, CK7, CK20, , CD10, CAIX, and calretinin.  There is positivity for CK 8 in approximately 66% of the neoplastic cells.  Cytokeratin SAIRA also shows positivity in a significant percentage of the neoplastic cells.  P53 shows nuclear positivity which is weak and involves  approximately 25% of cells.  There is strong diffuse positivity for Heppar-1 and arginase.  This staining pattern supports evidence of hepatic differentiation in this neoplasm.  Hepatocellular carcinoma would be the most likely diagnosis.  The possibility of a hepatoid carcinoma from another site cannot be entirely excluded although this is considered to be less likely.  Adequate tissue is present for molecular diagnostic studies if required.    This case was discussed with Dr. Jaimes by Dr. Street on 9/20/2021.           Assessment/Plan    1.  Metastatic hepatocellular carcinoma.  Had a long discussion with her and her daughter today.  I feel that treatment is really making her more sick than changing her disease trajectory.  I have recommended discontinuing all treatments and proceeding with a more palliative approach to end-of-life and considering hospice care.  She is very agreeable to that today.  So we will plan to arrange for hospice at home.  We will hopefully give her some comfort towards the end of her life.        Primitivo Pollock MD  [facility] Hematology and Oncology    Return in (Approximately): 1 month    No orders of the defined types were placed in this encounter.      8/11/2022

## 2022-08-12 ENCOUNTER — SPECIALTY PHARMACY (OUTPATIENT)
Dept: ONCOLOGY | Facility: HOSPITAL | Age: 82
End: 2022-08-12

## 2022-08-12 LAB — ALPHA-FETOPROTEIN: 208 NG/ML (ref 0–8.3)

## 2022-08-15 ENCOUNTER — TELEPHONE (OUTPATIENT)
Dept: ONCOLOGY | Facility: CLINIC | Age: 82
End: 2022-08-15

## 2022-08-15 NOTE — TELEPHONE ENCOUNTER
Maryana with hospice left a voicemail she got patient admitted. She needs a call back to discuss maybe getting a prescription for gabapentin, and an inhaler. Please call.

## 2022-08-15 NOTE — TELEPHONE ENCOUNTER
Unable to leave vm for patient's Hospice Nurse. To let her know patient may have Gabapentin and inhaler.

## 2022-08-15 NOTE — TELEPHONE ENCOUNTER
Returned call to  nurse. Informing her that the Gabapentin could be 100 mg at bedtime. Hospice nurse asking about inhaler. Informing her that she can substitute with hospice preferred. Nurse to changed it.

## 2022-08-19 ENCOUNTER — TELEPHONE (OUTPATIENT)
Dept: CARDIOLOGY | Facility: CLINIC | Age: 82
End: 2022-08-19

## 2022-08-19 NOTE — TELEPHONE ENCOUNTER
Noted; please give my condolences to the patient.  I hope she is remaining comfortable.  The medications prescribed to her are for control and relief of fluid congestion which can cause discomfort and I would continue the medications.    Thanks!

## 2022-08-19 NOTE — TELEPHONE ENCOUNTER
----- Message from Desiree Forte sent at 8/19/2022  4:09 PM EDT -----  Regarding: Refill  Dr. Lewis Morales May not know but I am now in hospice care. My oncologist, Dr. Pickens, says I can discontinue both of these. When I sent the question through to Dr Saucedo yesterday, I thought I was sending to Dr Pickens. When I realized my misdirection to Dr Saucedo , I sent it on to Dr Pickens too, so now I have two opposite answers. Can you help clarify?    ----- Message -----        From:AILYN TAVERAS        Sent:8/18/2022  2:15 PM EDT          To:Desiree Forte     Subject:Refill     We have you on both torsemide and spironolactone.     Thanks,   Carmen Us RN       ----- Message -----        From:Desiree Forte        Sent:8/18/2022  1:59 PM EDT          To:Carlyle Saucedo MD     Subject:Refill     Should I still continue both the spiralactone and torsemide  use or should I only be taking one of them?

## 2022-08-22 DIAGNOSIS — C22.0 HEPATOCELLULAR CARCINOMA METASTATIC TO PERITONEUM: Primary | ICD-10-CM

## 2022-08-22 DIAGNOSIS — R53.1 WEAKNESS GENERALIZED: ICD-10-CM

## 2022-08-22 DIAGNOSIS — C78.6 HEPATOCELLULAR CARCINOMA METASTATIC TO PERITONEUM: Primary | ICD-10-CM

## 2022-09-08 ENCOUNTER — TELEMEDICINE (OUTPATIENT)
Dept: ONCOLOGY | Facility: CLINIC | Age: 82
End: 2022-09-08

## 2022-09-08 DIAGNOSIS — C78.6 HEPATOCELLULAR CARCINOMA METASTATIC TO PERITONEUM: Primary | ICD-10-CM

## 2022-09-08 DIAGNOSIS — C22.0 HEPATOCELLULAR CARCINOMA METASTATIC TO PERITONEUM: Primary | ICD-10-CM

## 2022-09-08 PROCEDURE — 99213 OFFICE O/P EST LOW 20 MIN: CPT | Performed by: INTERNAL MEDICINE

## 2022-09-08 NOTE — PROGRESS NOTES
"TELEMEDICINE FOLLOW-UP     In order to limit face-to-face contact and enact \"social distancing\" in light of the COVID-19 outbreaks and in accordance to the recommendations per the CDC, WHO, and our individual department, Desiree Forte  was contacted.  Telemedicine was used to screen the patient for needs and conduct the patient's regularly scheduled follow-up.     COVID-19 screening: female specifically denies fever, body aches, cough, difficulty breathing, sore throat, runny nose, recent travel, or known sick exposures.      Desiree Forte was consented to undergo video televisit and was agreeable.      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  female presents today for follow-up of her hepatocellular carcinoma.  I did stop her treatments almost 2 months ago now.  She has done remarkably well since then.  She has put on some weight and she appears to be more functional.  She still has diarrhea.    Past medical history, social history and family history was reviewed 09/08/22 and unchanged from prior visit.    Review of Systems:    Review of Systems - Oncology         Medications:        Current Outpatient Medications:   •  carvedilol (COREG) 6.25 MG tablet, TAKE 1 TABLET BY MOUTH TWICE A DAY, Disp: 180 tablet, Rfl: 3  •  dronabinol (Marinol) 5 MG capsule, Take 1 capsule by mouth 2 (Two) Times a Day Before Meals., Disp: 60 capsule, Rfl: 0  •  Flovent  MCG/ACT inhaler, Inhale 1-2 puffs 2 (Two) Times a Day., Disp: , Rfl:   •  nystatin (MYCOSTATIN) 100,000 unit/mL suspension, Swish and swallow 5 mL 4 " (Four) Times a Day., Disp: 475 mL, Rfl: 1  •  promethazine-dextromethorphan (PROMETHAZINE-DM) 6.25-15 MG/5ML syrup, Take 5 mL by mouth 4 (Four) Times a Day As Needed for Cough., Disp: , Rfl:   •  spironolactone (ALDACTONE) 25 MG tablet, Take 0.5 tablets by mouth Daily., Disp: 45 tablet, Rfl: 3  •  torsemide (DEMADEX) 20 MG tablet, Take 1 tablet by mouth Daily., Disp: 90 tablet, Rfl: 3           ALLERGIES:    Allergies   Allergen Reactions   • Metformin Diarrhea   • Statins Myalgia     Muscle pain         Physical Exam    VITAL SIGNS:  There were no vitals taken for this visit.                Wt Readings from Last 3 Encounters:   08/11/22 39.9 kg (88 lb)   08/02/22 45.4 kg (100 lb)   07/13/22 45.4 kg (100 lb)       There is no height or weight on file to calculate BMI. There is no height or weight on file to calculate BSA.       Performance Status: 2    General: Cachectic appearing, in no acute distress        RECENT LABS:    Lab Results   Component Value Date    HGB 14.9 08/11/2022    HCT 48.6 (H) 08/11/2022    MCV 89.4 08/11/2022     08/11/2022    WBC 6.70 08/11/2022    NEUTROABS 5.20 08/11/2022    LYMPHSABS 1.00 08/11/2022    MONOSABS 0.40 08/11/2022    EOSABS 0.11 08/02/2022    BASOSABS 0.09 08/02/2022       Lab Results   Component Value Date    GLUCOSE 104 (H) 08/11/2022    BUN 26 (H) 08/11/2022    CREATININE 0.90 08/11/2022     08/11/2022    K 4.0 08/11/2022    CL 96 (L) 08/11/2022    CO2 30.0 (H) 08/11/2022    CALCIUM 10.6 (H) 08/11/2022    PROTEINTOT 7.4 08/11/2022    ALBUMIN 3.60 08/11/2022    BILITOT 0.8 08/11/2022    ALKPHOS 126 (H) 08/11/2022    AST 29 08/11/2022    ALT 9 08/11/2022       XR Knee 1 or 2 View Right    Result Date: 8/2/2022  No acute osseous findings. Chondrocalcinosis and degenerative changes.  This report was finalized on 8/2/2022 12:15 PM by Berry Rich MD.      CT Chest With Contrast Diagnostic    Result Date: 7/11/2022  1. Interval development of a small-to-moderate  right pleural effusion and minimal left pleural effusion. 2. Multiple small bilateral lower lobe pulmonary emboli, the largest of which appears to be associated with a 7 x 3 cm lateral right lower lobe infarct. 3. Ill-defined multifocal nodular and reticular disease of lungs elsewhere are concerning for infiltrating metastases, less likely atypical pneumonia.    Note: Preliminary findings were called to Dr. Pollock at approximately 5:20 PM 07/11/2022.    CT SCAN OF THE ABDOMEN PELVIS WITH IV CONTRAST: As on the prior study, there is diffuse fatty liver change. Today's exam shows a very small focus of subcapsular liver enhancement in the inferior right liver lobe, nonspecific, and essentially unchanged from the prior study, possibly incidental to the patient's known disease. No other hepatic lesions are identified. Spleen is not enlarged. No significant abnormalities are appreciated of the pancreas, adrenal glands, or kidneys except for renal cortical thinning, not unusual for the patient's age. Gallbladder is contracted but otherwise unremarkable.  The patient's advanced nodular peritoneal disease is overall relatively similar in distribution to the prior study, but with evidence of moderate gradual progression, including increasingly dense masslike anterior omental disease. No significant retroperitoneal lymphadenopathy is seen. There is little evidence of adenopathy in the small bowel mesentery. Bowel loops are normal in caliber. 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 Bladder is decompressed. Bony structures appear to be intact. Delayed venous phase images show no evidence of obstructive uropathy.  IMPRESSION:  1. Advanced, extensive peritoneal metastatic disease, with moderate general progression since 11/22/2021 exam. No evidence of intrinsic solid organ disease and no obvious retroperitoneal kortney disease. 2. Mild-to-moderate ascites, increased from prior study.  This report was finalized  on 7/11/2022 10:02 PM by Dr. Caleb Trevino MD.      CT Abdomen Pelvis With Contrast    Result Date: 7/11/2022  1. Interval development of a small-to-moderate right pleural effusion and minimal left pleural effusion. 2. Multiple small bilateral lower lobe pulmonary emboli, the largest of which appears to be associated with a 7 x 3 cm lateral right lower lobe infarct. 3. Ill-defined multifocal nodular and reticular disease of lungs elsewhere are concerning for infiltrating metastases, less likely atypical pneumonia.    Note: Preliminary findings were called to Dr. Pollock at approximately 5:20 PM 07/11/2022.    CT SCAN OF THE ABDOMEN PELVIS WITH IV CONTRAST: As on the prior study, there is diffuse fatty liver change. Today's exam shows a very small focus of subcapsular liver enhancement in the inferior right liver lobe, nonspecific, and essentially unchanged from the prior study, possibly incidental to the patient's known disease. No other hepatic lesions are identified. Spleen is not enlarged. No significant abnormalities are appreciated of the pancreas, adrenal glands, or kidneys except for renal cortical thinning, not unusual for the patient's age. Gallbladder is contracted but otherwise unremarkable.  The patient's advanced nodular peritoneal disease is overall relatively similar in distribution to the prior study, but with evidence of moderate gradual progression, including increasingly dense masslike anterior omental disease. No significant retroperitoneal lymphadenopathy is seen. There is little evidence of adenopathy in the small bowel mesentery. Bowel loops are normal in caliber. 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 Bladder is decompressed. Bony structures appear to be intact. Delayed venous phase images show no evidence of obstructive uropathy.  IMPRESSION:  1. Advanced, extensive peritoneal metastatic disease, with moderate general progression since 11/22/2021 exam. No evidence of  intrinsic solid organ disease and no obvious retroperitoneal kortney disease. 2. Mild-to-moderate ascites, increased from prior study.  This report was finalized on 7/11/2022 10:02 PM by Dr. Caleb Trevino MD.      FL Video Swallow With Speech Single Contrast    Result Date: 7/14/2022  Fluoroscopy provided for a modified barium swallow. Please see speech therapy report for full details and recommendations.    This report was finalized on 7/14/2022 3:50 PM by Manuel Lee.      XR Chest 1 View    Result Date: 8/2/2022  1.  Density in the right basilar area which has been noted and could relate to atelectasis and effusion. Underlying pneumonia not excluded. 2.  There is some density in left basilar area that may relate to atelectasis. 3.  Cardiomegaly.  This report was finalized on 8/2/2022 12:09 PM by Cliff King MD.      XR Chest 1 View    Result Date: 7/13/2022  1.  Right basilar consolidation, which could represent atelectasis or pneumonia. 2.  Pulmonary vascular congestion with small right pleural effusion. 3.  Cardiomegaly.  This report was finalized on 7/13/2022 10:45 AM by Alofnzo Bunn MD.            Assessment/Plan    1.  Metastatic hepatocellular carcinoma.  She is doing remarkably well since she is off treatment.  She is being managed by hospice.  Has times of confusion.  But otherwise doing well.  For now palliative care and hospice following.  We will check on her in 3 months to make sure she is doing well.        I spent a total of 15 minutes in direct patient care, greater than 15 minutes (greater than 50%) were spent in coordination of care, and counseling the patient regarding hepatocellular carcinoma. Answered any questions patient had with medication and plan.      Primitivo Pollock MD  [facility]    Return on: 12/08/22  Return in (Approximately): 3 months    No orders of the defined types were placed in this encounter.      9/8/2022

## 2022-10-31 RX ORDER — TORSEMIDE 20 MG/1
TABLET ORAL
Qty: 90 TABLET | Refills: 1 | Status: SHIPPED | OUTPATIENT
Start: 2022-10-31

## (undated) DEVICE — THE CARR-LOCKE INJECTION NEEDLE IS A SINGLE USE, DISPOSABLE, FLEXIBLE SHEATH INJECTION NEEDLE USED FOR THE INJECTION OF VARIOUS TYPES OF MEDIA THROUGH FLEXIBLE ENDOSCOPES.

## (undated) DEVICE — ENDOPATH XCEL BLADELESS TROCARS WITH STABILITY SLEEVES: Brand: ENDOPATH XCEL

## (undated) DEVICE — PATIENT RETURN ELECTRODE, SINGLE-USE, CONTACT QUALITY MONITORING, ADULT, WITH 9FT CORD, FOR PATIENTS WEIGING OVER 33LBS. (15KG): Brand: MEGADYNE

## (undated) DEVICE — SYR LUERLOK 50ML

## (undated) DEVICE — [HIGH FLOW INSUFFLATOR,  DO NOT USE IF PACKAGE IS DAMAGED,  KEEP DRY,  KEEP AWAY FROM SUNLIGHT,  PROTECT FROM HEAT AND RADIOACTIVE SOURCES.]: Brand: PNEUMOSURE

## (undated) DEVICE — KT ORCA ORCAPOD DISP STRL

## (undated) DEVICE — SNAR POLYP CAPTIVATOR MICROHEX 13 240CM

## (undated) DEVICE — SUT MNCRYL PLS ANTIB UD 4/0 PS2 18IN

## (undated) DEVICE — THE BITE BLOCK MAXI, LATEX FREE STRAP IS USED TO PROTECT THE ENDOSCOPE INSERTION TUBE FROM BEING BITTEN BY THE PATIENT.

## (undated) DEVICE — CONTN GRAD MEAS TRIANG 32OZ BLK

## (undated) DEVICE — TUBING, SUCTION, 1/4" X 10', STRAIGHT: Brand: MEDLINE

## (undated) DEVICE — SINGLE-USE BIOPSY FORCEPS: Brand: RADIAL JAW 4

## (undated) DEVICE — GLV SURG SENSICARE PI MIC PF SZ8 LF STRL

## (undated) DEVICE — ENDOCUT SCISSOR TIP, DISPOSABLE: Brand: RENEW

## (undated) DEVICE — THE SINGLE USE ETRAP – POLYP TRAP IS USED FOR SUCTION RETRIEVAL OF ENDOSCOPICALLY REMOVED POLYPS.: Brand: ETRAP

## (undated) DEVICE — GLV SURG SENSICARE PI MIC PF SZ8.5 LF STRL

## (undated) DEVICE — FIAPC® PROBE W/ FILTER 3000 A OD 2.3MM/6.9FR; L 3M/9.8FT: Brand: ERBE

## (undated) DEVICE — LUBE GEL ENDOGLIDE 1.1OZ

## (undated) DEVICE — ENDOPOUCH RETRIEVER SPECIMEN RETRIEVAL BAGS: Brand: ENDOPOUCH RETRIEVER

## (undated) DEVICE — 30977 SEE SHARP - ENHANCED INTRAOPERATIVE LAPAROSCOPE CLEANING & DEFOGGING: Brand: 30977 SEE SHARP - ENHANCED INTRAOPERATIVE LAPAROSCOPE CLEANING & DEFOGGING

## (undated) DEVICE — INTRO ACCSR BLNT TP

## (undated) DEVICE — SUT VIC 0 UR6 27IN VCP603H

## (undated) DEVICE — GOWN,PREVENTION PLUS,XXLARGE,STERILE: Brand: MEDLINE

## (undated) DEVICE — MARYLAND JAW LAPAROSCOPIC SEALER/DIVIDER COATED: Brand: LIGASURE

## (undated) DEVICE — PK LAP LASR CHOLE 10